# Patient Record
Sex: MALE | Race: WHITE | NOT HISPANIC OR LATINO | Employment: FULL TIME | URBAN - METROPOLITAN AREA
[De-identification: names, ages, dates, MRNs, and addresses within clinical notes are randomized per-mention and may not be internally consistent; named-entity substitution may affect disease eponyms.]

---

## 2017-06-19 ENCOUNTER — TRANSFERRED RECORDS (OUTPATIENT)
Dept: HEALTH INFORMATION MANAGEMENT | Facility: CLINIC | Age: 52
End: 2017-06-19

## 2018-01-25 ENCOUNTER — TELEPHONE (OUTPATIENT)
Dept: TRANSPLANT | Facility: CLINIC | Age: 53
End: 2018-01-25

## 2018-01-25 DIAGNOSIS — I10 HYPERTENSION: Primary | ICD-10-CM

## 2018-01-25 DIAGNOSIS — E10.8 DM (DIABETES MELLITUS), TYPE 1 WITH COMPLICATIONS (H): ICD-10-CM

## 2018-01-25 DIAGNOSIS — Z76.82 ORGAN TRANSPLANT CANDIDATE: ICD-10-CM

## 2018-01-25 NOTE — LETTER
Clemente Graham  [address] [facility] [address]  Critical access hospital [id number]    Dear Clemente,    Thank you for your interest in the Transplant Center at [facility], [place], [facility]. We look forward to being a part of your care team and assisting you through the transplant process.    As we discussed, your transplant coordinator is Letty Canas (Pancreas).  You may call your coordinator at any time with questions or concerns.  Your first scheduled call will be on 2/13/2018 between 2-4pm and your tentative evaluation date is 3/19/2018.  If this needs to change, call [phone].    Please complete the following.    1. Fill out and return the enclosed forms    Authorization for Electronic Communication    Authorization to Discuss Protected Health Information    [employer] Release of Information    2. Sign up for:    [employer], access to your electronic medical record (see enclosed pamphlet)    [employer], a transplant education website (see enclosed booklet)    You can use these tools to learn more about your transplant, communicate with your care team, and track your medical details      Sincerely,      Solid Organ Transplant  [facility], [place], [facility]    cc: Referring Physician,PCP

## 2018-01-25 NOTE — TELEPHONE ENCOUNTER
Intake Progress Note  Nurse Call: 2/13/2018 between 2-4pm  Save the Date: 3/19/2018 w/Finger    Organ:  Pancreas    [facility]    Referring Physician (outside provider, if patient does not remember the name of provider contact clinic or dialysis unit to get this information) :  Dr. Clark Culver  (If inside referral, ask who their community nephrologist is that sent the to [facility])          PCP: [place]    Assigned Coordinator: Selma Missael    Reported Diagnosis that caused the kidney failure ( not CKD)  Diabetic type 1    Best time patient can be reached:  Anytime    How would you like to be communicated with, through [employer], phone, or mail? phone      Records:  [employer] requested from: [facility] [place]      Insurance information:  [facility]  Policy mejia:  self  Subscriber/policy/ID number:  [id number]  Group Number:  [id number]    History of diabetes:  yes  Type 1    If yes, age of onset:  17 yrs old    Do you have an endocrinologist?: no  Name and Location:  n/a         On insulin or oral medication:  yes          What type of insulin and how many units? On pump          History of a kidney biopsy:  no         If Yes,when and where:  n/a    Past Medical and Surgical History (updated in Epic medical / surgical):             History of  cancer personally:  no, What type? n/a              Where and when was it treated? n/a                            History of cardiac events:  no             When and where was it was it treated?n/a             History abdominal surgeries other than previous transplant: no What type? n/a              Where and when? n/a              History of previous transplant: no             If Yes where and estimated date:  n/a             Listed or in eval at another transplant center?  no             History of hospitalization in last 12 months:  no               If Yes:  n/a               History of blood transfusion:  no                Smoking history:  no     Current smoker:  no  How much?n/a      Quit Date:  n/a    On dialysis: no  Where: n/a                 Type of Dialysis: n/a   Start date: n/a       Dialysis Days:  n/a       If NYOD, estimated GFR:  ?  Height:  6'4  Weight:  260  BMI:  31.64    Health Maintenance:  PAP: n/a  Mammo:  n/a  Colon:  6/2016  PSA:  n/a  Dental: 1 yr ago, have dentures  Vaccines: Up to date  Special Needs (ie--wheelchair, assistance, guardian, interpretor):  no    As for the next steps, as long as we are able to get financial approval and receive your medical records, you should be expecting a call from your Transplant Coordinator in about 2 weeks. Your Transplant Coordinator will go into more detail about the evaluation process, but we can put a hold on a tentative date for your transplant evaluation now. Kidney (K/P) evaluations are scheduled for Monday, Wednesday, or Thursdays, and can start as early as 6:30 am and end as late as 4:30pm. Would one of these days work better for you? Great, I am going to put a hold on Day/Month for you. Again, this appointment day and time is subject to change and is dependent on financial approval from your insurance company and our receiving your medical records so we are able to meet your individual needs.    I also want to schedule your first call with the coordinator. (Offer a couple choices and schedule patient's preferred date and time.)      Inform patient on the need to arrange age appropriate cancer screening, vaccines up to date and dental clearance    Reviewed evaluation process and reminded patient to complete questionnaire, complete medical records release, and review packet prior to evaluation visit     Informed patient that coordinator will review their chart and insurance coverage and if no concerns they will receive a call from a  to schedule evaluation

## 2018-02-07 ENCOUNTER — MEDICAL CORRESPONDENCE (OUTPATIENT)
Dept: TRANSPLANT | Facility: CLINIC | Age: 53
End: 2018-02-07

## 2018-02-13 NOTE — TELEPHONE ENCOUNTER
I reviewed the records and called Clemente in response to Nephrologist Dr Culver's referral for possible pancreas transplant.  Clemente is a 53 year old who has had Type 1 diabetes since age 17.  He is currently using an Insulin pump but A1C persistently in 13 range.  He has microalbuminuriia, GFR of 58 January 2018 but has been as low as 40, also has hypertension, background retinopathy, polyneuropathy, amputated toe, *high PVR so now straight caths 2-4 times/day.  He is followed by a Urologist and has had urodynamics.  He denies trouble with UTIs.  He does not have an Endocrinologist.  He underwent stress test 12/2017 and states he has an appointment with an [facility] Cardiologist 2/15/18; I advised him to discuss transplant with him and request risk assessment for transplant surgery if possible but I didn't guarantee that our team would accept.  Clemente still works at [employer] and asked questions about post-op recovery and limitations.  I gave him basic information about pancreas transplant, benefits and risks, post-transplant care, medications.  I told him that him that his kidney function will be carefully considered and told him that some individuals will end up needing a kidney transplant.  He says he has siblings who might be willing donors if he needs a kidney.  [place]  Wife will likely be coming with him.  Orders to  for 1.5 days of evaluation so he can have ultrasounds on 2nd day which require him to be NPO.  I provided my direct number and told him to call with questions.  I sensed that Clemente has some hesitation about the idea of pancreas transplant, I told him there is no commitment at this point and he sounded relieved to hear that.    Health Maintenance:  Colonoscopy completed 6/2017 (next due 6/2022).  Has full dentures so does not need dentist.  Has had Pneumovax 4/2017, likely will need Heptavax.

## 2018-02-22 ENCOUNTER — TELEPHONE (OUTPATIENT)
Dept: TRANSPLANT | Facility: CLINIC | Age: 53
End: 2018-02-22

## 2019-09-26 ENCOUNTER — ALLIED HEALTH/NURSE VISIT (OUTPATIENT)
Dept: EDUCATION SERVICES | Facility: OTHER | Age: 54
End: 2019-09-26
Attending: FAMILY MEDICINE
Payer: COMMERCIAL

## 2019-09-26 DIAGNOSIS — E10.65 UNCONTROLLED TYPE 1 DIABETES MELLITUS WITH HYPERGLYCEMIA (H): Primary | ICD-10-CM

## 2019-09-26 PROCEDURE — G0108 DIAB MANAGE TRN  PER INDIV: HCPCS | Performed by: REGISTERED NURSE

## 2019-09-26 NOTE — PROGRESS NOTES
"   Diabetes Education Consult for CGM and Insulin Pump    SUBJECTIVE:  Clemente Graham is an 54 year old male who presents for education regarding Continuous Glucose Monitoring (CGM) and insulin pump therapy for Type 1 diabetes mellitus.   Current treatment includes SMBG and Medtronic insulin pump.      Patient brings in a back up pump sent to him from [employer] as his current pump was cracked.  He would like his settings transferred to this back up pump.  He states will be eligible for a new pump at the end of next year.  He is interested to learn more about newer pumps and CGM.         No results found for: GLUCOSE  No results found for: CHOL, CHOLHDLRATIO, HDL, LDL  No components found for: MICROALBCRU, [id number], MICROALBRAND      Social History     Tobacco Use     Smoking status: Not on file   Substance Use Topics     Alcohol use: Not on file       No current outpatient medications on file.        Allergies: Patient has no allergy information on record.     OBJECTIVE:  Settings transferred and entered into back up pump, Medtronic Revel.  ICR 1:8 grams, ISF 1:45 mg/dL, Target 100 - 120 mg/dL, IOB 4:00 hours and Basal 34.00 units per 24 hours.  No new setting changes at this time.  Due to elevated HbA1c, currently 12.7%, recommend patient visit with his CDE for insulin pump adjustment.  Stressed importance of testing BG daily to help keep tight control of T1DM, helping to minimize risk for possible complications of uncontrolled diabetes.  Discussed benefits of keeping A1c under 7% and encouraged patient to begin testing BG daily or begin using Continuous Glucose Monitoring.      Patient states he used to have the FreeStyle Thao CGM, but insurance stopped covering this.  He is interested in learning more about Dexcom CGM with alerts and alarms.  He notes, \"I remember my doctor tried to get me the Dexcom, but I didn't get a call back from [employer].  I'm not sure why.\"    Discussed using CGM and an insulin pump for " tighter control of T1DM.  Discussed how the pump and CGM works as well as benefits and contraindications regarding CGM and pump therapy.      Patient most interested in Dexcom G6 CGM.  Demonstrated device and brochure given for further review.  Prescription request will be sent to PCP.  After submission, patient should receive call from [facility] in [place] with results of insurance benefit investigation.      PLAN:    Encouraged patient to follow up if any further questions or concerns.      Time spent with patient was 30 minutes.     Bindu Spring RN, BSN, CDE  9/26/2019 11:28 AM

## 2019-11-13 ENCOUNTER — TELEPHONE (OUTPATIENT)
Dept: INTERNAL MEDICINE | Facility: OTHER | Age: 54
End: 2019-11-13

## 2019-11-13 NOTE — TELEPHONE ENCOUNTER
Patients wife called stating patient has blisters on his toes and he is diabetic. Patient wanted to see RVK herself, and did not want me to check on other providers who may had openings. He wanted to be worked in before her next available on 11/19.    Please call patient's wife at [phone]    Rochelle Garrett on 11/13/2019 at 9:03 AM

## 2019-11-13 NOTE — TELEPHONE ENCOUNTER
Spoke with christine and relayed message, transferred to scheduling. Sachi Arguelles LPN .............11/13/2019  10:14 AM

## 2019-11-18 ENCOUNTER — OFFICE VISIT (OUTPATIENT)
Dept: INTERNAL MEDICINE | Facility: OTHER | Age: 54
End: 2019-11-18
Attending: NURSE PRACTITIONER
Payer: COMMERCIAL

## 2019-11-18 VITALS
HEIGHT: 76 IN | BODY MASS INDEX: 29.37 KG/M2 | DIASTOLIC BLOOD PRESSURE: 76 MMHG | WEIGHT: 241.2 LBS | SYSTOLIC BLOOD PRESSURE: 136 MMHG | OXYGEN SATURATION: 97 % | RESPIRATION RATE: 16 BRPM | HEART RATE: 83 BPM | TEMPERATURE: 96.6 F

## 2019-11-18 DIAGNOSIS — R33.9 URINARY RETENTION: ICD-10-CM

## 2019-11-18 DIAGNOSIS — E10.621 DIABETIC ULCER OF TOE OF LEFT FOOT ASSOCIATED WITH TYPE 1 DIABETES MELLITUS, UNSPECIFIED ULCER STAGE (H): Primary | ICD-10-CM

## 2019-11-18 DIAGNOSIS — L97.529 DIABETIC ULCER OF TOE OF LEFT FOOT ASSOCIATED WITH TYPE 1 DIABETES MELLITUS, UNSPECIFIED ULCER STAGE (H): Primary | ICD-10-CM

## 2019-11-18 DIAGNOSIS — I10 BENIGN ESSENTIAL HYPERTENSION: ICD-10-CM

## 2019-11-18 DIAGNOSIS — E10.59 TYPE 1 DIABETES MELLITUS WITH OTHER CIRCULATORY COMPLICATION (H): ICD-10-CM

## 2019-11-18 PROCEDURE — 99203 OFFICE O/P NEW LOW 30 MIN: CPT | Performed by: NURSE PRACTITIONER

## 2019-11-18 RX ORDER — PROCHLORPERAZINE 25 MG/1
SUPPOSITORY RECTAL
COMMUNITY
Start: 2019-10-25 | End: 2022-06-23

## 2019-11-18 RX ORDER — LISINOPRIL 40 MG/1
40 TABLET ORAL DAILY
COMMUNITY
Start: 2019-06-12 | End: 2020-06-23

## 2019-11-18 RX ORDER — ATORVASTATIN CALCIUM 40 MG/1
40 TABLET, FILM COATED ORAL DAILY
COMMUNITY
Start: 2019-05-28 | End: 2020-08-11

## 2019-11-18 RX ORDER — TAMSULOSIN HYDROCHLORIDE 0.4 MG/1
1 CAPSULE ORAL DAILY
Refills: 3 | COMMUNITY
Start: 2019-10-16 | End: 2021-04-08

## 2019-11-18 RX ORDER — SULFAMETHOXAZOLE/TRIMETHOPRIM 800-160 MG
1 TABLET ORAL 2 TIMES DAILY
COMMUNITY
Start: 2019-11-17 | End: 2019-11-21

## 2019-11-18 RX ORDER — MULTIVITAMIN
1 TABLET ORAL DAILY
COMMUNITY
Start: 2007-01-22

## 2019-11-18 RX ORDER — AMLODIPINE BESYLATE 5 MG/1
5 TABLET ORAL AT BEDTIME
COMMUNITY
Start: 2019-07-03 | End: 2020-07-30

## 2019-11-18 SDOH — HEALTH STABILITY: MENTAL HEALTH: HOW OFTEN DO YOU HAVE A DRINK CONTAINING ALCOHOL?: MONTHLY OR LESS

## 2019-11-18 ASSESSMENT — ENCOUNTER SYMPTOMS
DIAPHORESIS: 0
NUMBNESS: 1
WOUND: 1
FATIGUE: 0
FEVER: 0
CHILLS: 0

## 2019-11-18 ASSESSMENT — PAIN SCALES - GENERAL: PAINLEVEL: NO PAIN (0)

## 2019-11-18 ASSESSMENT — MIFFLIN-ST. JEOR: SCORE: 2035.58

## 2019-11-18 NOTE — PROGRESS NOTES
Subjective:  He is here today for wound evaluation.  He believes he had an injury to his foot over a week ago when he kicked an object.  He noticed blisters on the first 2 toes last week.  He had appointment scheduled but then was hospitalized from November 14 through the 17th with diabetic infection of the left foot primarily great toe and second digit.  He was treated with IV Zosyn and then switched over to IV vancomycin as staph aureus was growing and there was concern for MRSA.  While hospitalized he had no fever or chills but wanted to get treated immediately when he noticed blisters and redness.  He has noticed improvement in the redness and swelling since being in the hospital.  He was discharged on Bactrim DS twice daily for 1 week.  He was not scheduled to follow-up appointment with his primary doctor since he had wound care visit appointment today.  He currently is cleansing the wounds with chlorhexidine and applying Telfa.  He has been elevating.  He does not wear diabetic custom fitted footwear.  He usually wears his tennis shoes and work boots and neither of these have ever caused problems in the past.  While hospitalized he did have an x-ray of the left great toe which showed some subcutaneous emphysema.  He then went on to have a CT scan which still showed some air-fluid levels however treating hospital doctor was able to drain some pus and felt that was likely the cause.  He did not have any follow-up CT scan or x-ray after that procedure.  Does have history of partial amputation of the right great toe about 10 years ago due to a diabetic ulcer.  That became infected due to an ingrown toenail.  He does have poorly controlled type 1 diabetes.  Last A1c was a month ago at 12.7%.  He started working with Bindu, diabetic nurse educator and recently received a Dexcom.  He has not scheduled a follow-up visit with her.  He has not seen his primary doctor, Dr. Wayne in a few months.    Patient Active  Problem List   Diagnosis     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)     Diabetic ulcer of toe of left foot associated with type 1 diabetes mellitus, unspecified ulcer stage (H)     Benign essential hypertension     Urinary retention     Past Medical History:   Diagnosis Date     Carpal tunnel syndrome, bilateral      Diabetic foot ulcer (H)      Hyperlipidemia      Hypertension      Microalbuminuria      Neuropathy      Retinopathy      Trigger finger of both hands      Type 1 diabetes (H) 1982     Ulnar nerve entrapment at elbow, left      Urinary retention      Past Surgical History:   Procedure Laterality Date     AMPUTATION Right     Great toe     CARPAL TUNNEL RELEASE RT/LT Bilateral      CLOSED REDUCTION, PERCUTANEOUS PINNING HIP      age 14, pinned for dislocation     ROTATOR CUFF REPAIR RT/LT Bilateral      Social History     Socioeconomic History     Marital status:      Spouse name: Not on file     Number of children: Not on file     Years of education: Not on file     Highest education level: Not on file   Occupational History     Not on file   Social Needs     Financial resource strain: Not on file     Food insecurity:     Worry: Not on file     Inability: Not on file     Transportation needs:     Medical: Not on file     Non-medical: Not on file   Tobacco Use     Smoking status: Never Smoker     Smokeless tobacco: Never Used     Tobacco comment: no ecig   Substance and Sexual Activity     Alcohol use: Yes     Frequency: Monthly or less     Drug use: Never     Sexual activity: Not Currently   Lifestyle     Physical activity:     Days per week: Not on file     Minutes per session: Not on file     Stress: Not on file   Relationships     Social connections:     Talks on phone: Not on file     Gets together: Not on file     Attends [employer] service: Not on file     Active member of club or organization: Not on file     Attends meetings of clubs or organizations: Not on file      Relationship status: Not on file     Intimate partner violence:     Fear of current or ex partner: Not on file     Emotionally abused: Not on file     Physically abused: Not on file     Forced sexual activity: Not on file   Other Topics Concern     Not on file   Social History Narrative    Works at [employer].    - wife Jeanette    1 son- Will    Preload  7/2/2013     Family History   Problem Relation Age of Onset     Cancer Mother         ovarian     Prostate Cancer Father      Current Outpatient Medications   Medication Sig Dispense Refill     amLODIPine (NORVASC) 5 MG tablet Take 5 mg by mouth At Bedtime       atorvastatin (LIPITOR) 40 MG tablet Take 40 mg by mouth daily       blood glucose (NO BRAND SPECIFIED) test strip Check sugar 6 times daily       Catheters MISC Bard Magic3 14F ref #[id number] - use to self-catheterize 4 times daily.       Continuous Blood Gluc  (DEXCOM G6 ) RONALD        Continuous Blood Gluc Sensor (DEXCOM G6 SENSOR) MISC every 30 days       Continuous Blood Gluc Transmit (DEXCOM G6 TRANSMITTER) MISC        glucagon 1 MG kit Inject 1 mg into the muscle as needed       insulin aspart (NOVOLOG VIAL) 100 UNITS/ML vial pump       lisinopril (PRINIVIL/ZESTRIL) 40 MG tablet Take 40 mg by mouth daily       Multiple Vitamin (MULTI VITAMIN W/D-3) TABS Take 1 tablet by mouth daily       sulfamethoxazole-trimethoprim (BACTRIM DS/SEPTRA DS) 800-160 MG tablet Take 1 tablet by mouth 2 times daily       aspirin (ASA) 81 MG tablet Take 1 tablet by mouth daily       tamsulosin (FLOMAX) 0.4 MG capsule Take 1 capsule by mouth daily  3     Patient has no known allergies.      Review of Systems:  Review of Systems   Constitutional: Negative for chills, diaphoresis, fatigue and fever.   Skin: Positive for wound.        Reports he has never had any angiography of the lower extremities to evaluate arterial flow.  Denies claudication symptoms.   Neurological: Positive for numbness.      "   No sensation in feet due to severe diabetic neuropathy.       Objective:   /76 (BP Location: Right arm, Patient Position: Sitting, Cuff Size: Adult Large)   Pulse 83   Temp 96.6  F (35.9  C) (Tympanic)   Resp 16   Ht 1.93 m (6' 4\")   Wt 109.4 kg (241 lb 3.2 oz)   SpO2 97%   BMI 29.36 kg/m    Physical Exam  Pleasant gentleman accompanied by his wife.  No acute distress.  He does still have light pink discoloration at the first second third and fourth digits.  Redness has regressed from the marker lines that were placed prior to treatment with IV antibiotics in hospital.  He is missing the right great toenail.  There is some fluctuance at the base of the nailbed.  Scant amount of clear drainage coming from the medial aspect.  He does have ulceration on the right great toe measuring 0.3 x 0.7 cm.  There is some slough present along the base of the nailbed which is the area of the fluctuance.  Second digit left foot dorsal surface has a wound that measures 0.6 x 0.6 cm in diameter which is covered with  50% granulation tissue and 50% brown eschar.  No fluctuance.  There is some wrinkling of both of the digits from a regression of the swelling.  No ulcerations on the third or fourth digits.  Left foot DP PT is palpable.  Wounds are cleansed with chlorhexidine and Telfa applied and secured with Curlex.  Assessment:    ICD-10-CM    1. Diabetic ulcer of toe of left foot associated with type 1 diabetes mellitus, unspecified ulcer stage (H) E10.621 GENERAL SURG ADULT REFERRAL    L97.529     1st and 2nd digit, dorsal surface; abnormal xr and ct with subq emphysema   2. Type 1 diabetes mellitus with other circulatory complication (H) E10.59    3. Benign essential hypertension I10    4. Urinary retention R33.9        Plan:   1.  Discussed the importance of clearing the infection as he is at high risk of progressive infection and poor wound healing due to his poorly controlled type 1 diabetes.  For these reasons " he is at increased risk of amputation.  Antibiotics seem to be working at this time.  He was treated with both IV vancomycin and Zosyn while hospitalized.  He was discharged on Bactrim DS 1 tablet twice daily for 7 days.  He was discharged from the hospital yesterday after a 3-day hospital stay.  He had a CT and x-ray which both had concern for subcutaneous emphysema.  Will get a consult from 1 of our surgeons to make sure this has cleared and for further wound management.  At this time were going to have him continue with the chlorhexidine and the Telfa twice daily until this infection resolves.  Wound can be cleansed with soap and water.  He is at risk of MRSA.  Was not able to see blood culture or wound culture results.  Suspect these likely will be viewable through care everywhere wants final reports are completed.  If he develops increased erythema, warmth, swelling, fever, chills that he needs to be return for evaluation urgently as likely will need further hospitalization and IV antibiotics.  At some point patient likely would benefit from seeing orthotist to evaluate his foot wear.  May also need evaluation for PAD.  patient wrongly feels that the cause of his ulceration is that he kicked something with his foot last week which caused the injury.  2.  We will get him a follow-up appointment with diabetic nurse educator.  I have asked that he schedule a follow-up appoint with his primary doctor for diabetes management as well.    HOWIE Humphreys   11/18/2019  10:48 AM

## 2019-11-18 NOTE — NURSING NOTE
Chief Complaint   Patient presents with     WOUND CARE       Medication Reconciliation: complete  Sachi Arguelles LPN  ..................11/18/2019   10:14 AM

## 2019-11-19 ENCOUNTER — OFFICE VISIT (OUTPATIENT)
Dept: SURGERY | Facility: OTHER | Age: 54
End: 2019-11-19
Attending: NURSE PRACTITIONER
Payer: COMMERCIAL

## 2019-11-19 VITALS
TEMPERATURE: 97.1 F | RESPIRATION RATE: 18 BRPM | WEIGHT: 248 LBS | BODY MASS INDEX: 30.19 KG/M2 | SYSTOLIC BLOOD PRESSURE: 138 MMHG | DIASTOLIC BLOOD PRESSURE: 70 MMHG | HEART RATE: 80 BPM

## 2019-11-19 DIAGNOSIS — L08.9 TYPE 1 DIABETES MELLITUS WITH DIABETIC FOOT INFECTION (H): ICD-10-CM

## 2019-11-19 DIAGNOSIS — E10.628 TYPE 1 DIABETES MELLITUS WITH DIABETIC FOOT INFECTION (H): ICD-10-CM

## 2019-11-19 PROCEDURE — 99203 OFFICE O/P NEW LOW 30 MIN: CPT | Performed by: SURGERY

## 2019-11-19 ASSESSMENT — PAIN SCALES - GENERAL: PAINLEVEL: NO PAIN (0)

## 2019-11-19 NOTE — PROGRESS NOTES
GENERAL SURGERY CONSULTATION NOTE    Clemente Graham   [id number] [address] [id number]  54 year old  male  Admission Date/Time: No admission date for patient encounter.  Primary Care Provider:  [place] was asked to see this patient by Chastity Marquez for evaluation of left 1st and second toe infecction.     HPI: Clemente Graham is a long standing diabetic with poor control of his diabetes and infection of his left 1st and second toes. Previously had a right partial toe amputation that healed well. No claudication. Gets cool hands, but not blue toes in cold weather.  Culture shows MSSA.       REVIEW OF SYSTEMS:    GENERAL: No fevers or chills. Denies fatigue, recent weight loss.  HEENT: No sinus drainage. No changes with vision or hearing. No difficulty swallowing.   LYMPHATICS:  Noswollen nodes in axilla, neck or groin.  CARDIOVASCULAR: Denies chest pain, palpitations and dyspnea on exertion.  PULMONARY: No shortness of breath or cough. No increase in sputum production.  GI: Denies melena, bright red blood in stools. No hematemesis. No constipation or diarrhea.  : No dysuria or hematuria.  SKIN: No recent rashes or ulcers.   HEMATOLOGY:  No history of easy bruising or bleeding.  ENDOCRINE:  ++ diabetes No thyroid problems.  NEUROLOGY:  No history of seizures or headaches. No motor or sensory changes.        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)     Diabetic ulcer of toe of left foot associated with type 1 diabetes mellitus, unspecified ulcer stage (H)     Benign essential hypertension     Urinary retention       Past Medical History:   Diagnosis Date     Carpal tunnel syndrome, bilateral      Diabetic foot ulcer (H)      Hyperlipidemia      Hypertension      Microalbuminuria      Neuropathy      Retinopathy      Trigger finger of both hands      Type 1 diabetes (H) 1982     Ulnar nerve entrapment at elbow, left      Urinary  retention        Past Surgical History:   Procedure Laterality Date     AMPUTATION Right     Great toe     CARPAL TUNNEL RELEASE RT/LT Bilateral      CLOSED REDUCTION, PERCUTANEOUS PINNING HIP      age 14, pinned for dislocation     ROTATOR CUFF REPAIR RT/LT Bilateral        Family History   Problem Relation Age of Onset     Cancer Mother         ovarian     Prostate Cancer Father        Social History     Social History Narrative    Works at [employer].    - wife Jeanette Rubi son- Will    Preload  7/2/2013       Social History     Socioeconomic History     Marital status:      Spouse name: Not on file     Number of children: Not on file     Years of education: Not on file     Highest education level: Not on file   Occupational History     Not on file   Social Needs     Financial resource strain: Not on file     Food insecurity:     Worry: Not on file     Inability: Not on file     Transportation needs:     Medical: Not on file     Non-medical: Not on file   Tobacco Use     Smoking status: Never Smoker     Smokeless tobacco: Never Used     Tobacco comment: no ecig   Substance and Sexual Activity     Alcohol use: Yes     Frequency: Monthly or less     Drug use: Never     Sexual activity: Not Currently   Lifestyle     Physical activity:     Days per week: Not on file     Minutes per session: Not on file     Stress: Not on file   Relationships     Social connections:     Talks on phone: Not on file     Gets together: Not on file     Attends Evangelical service: Not on file     Active member of club or organization: Not on file     Attends meetings of clubs or organizations: Not on file     Relationship status: Not on file     Intimate partner violence:     Fear of current or ex partner: Not on file     Emotionally abused: Not on file     Physically abused: Not on file     Forced sexual activity: Not on file   Other Topics Concern     Not on file   Social History Narrative    Works at [employer].    - wife Jeanette    1 son- Will    Preload  7/2/2013       amLODIPine (NORVASC) 5 MG tablet, Take 5 mg by mouth At Bedtime  aspirin (ASA) 81 MG tablet, Take 1 tablet by mouth daily  atorvastatin (LIPITOR) 40 MG tablet, Take 40 mg by mouth daily  blood glucose (NO BRAND SPECIFIED) test strip, Check sugar 6 times daily  Catheters [facility], Bard Magic3 14F ref #[id number] - use to self-catheterize 4 times daily.  Continuous Blood Gluc  (DEXCOM G6 ) RONALD,   Continuous Blood Gluc Sensor (DEXCOM G6 SENSOR) MISC, every 30 days  Continuous Blood Gluc Transmit (DEXCOM G6 TRANSMITTER) MISC,   glucagon 1 MG kit, Inject 1 mg into the muscle as needed  insulin aspart (NOVOLOG VIAL) 100 UNITS/ML vial, pump  lisinopril (PRINIVIL/ZESTRIL) 40 MG tablet, Take 40 mg by mouth daily  Multiple Vitamin (MULTI VITAMIN W/D-3) TABS, Take 1 tablet by mouth daily  sulfamethoxazole-trimethoprim (BACTRIM DS/SEPTRA DS) 800-160 MG tablet, Take 1 tablet by mouth 2 times daily  tamsulosin (FLOMAX) 0.4 MG capsule, Take 1 capsule by mouth daily    No current facility-administered medications on file prior to visit.         ALLERGIES/SENSITIVITIES: No Known Allergies    PHYSICAL EXAM:     /70 (BP Location: Right arm, Patient Position: Sitting, Cuff Size: Adult Large)   Pulse 80   Temp 97.1  F (36.2  C) (Temporal)   Resp 18   Wt 112.5 kg (248 lb)   BMI 30.19 kg/m      General Appearance:   Appears well   Heart & CV:  RRR no murmur.  Intact distal pulses, good cap refill.  LUNGS:  CTA B/L, no wheezing or crackles.  Abd:  Soft, flat, nontender  Ext: Left foot shows 1st toe with nail avulsion. There is erythema to the base of the toes. The second and third toes have superficial blisters. The Left foot has a palpable DP and a dopperable PT with monophasic to weakly biphasic waveforms. The DP is triphasic.       ADDITIONAL COMMENTS:      CONSULTATION ASSESSMENT AND PLAN:    54 year old male with diabetic left foot  infection s/p nail avulsion and IV antibiotics.   - Complete antibiotics  - Follow-up in 3-4 wks and sooner if not continuing to improve.   - No role for debridement or amputation at this point.   - Would pursue COLLEEN and MRA/CTA if a residual ulcer remains.     Polo Merida MD on 11/19/2019 at 1:25 PM

## 2019-11-19 NOTE — NURSING NOTE
"Chief Complaint   Patient presents with     WOUND CARE     ulcer on toe       Initial /70 (BP Location: Right arm, Patient Position: Sitting, Cuff Size: Adult Large)   Pulse 80   Temp 97.1  F (36.2  C) (Temporal)   Resp 18   Wt 112.5 kg (248 lb)   BMI 30.19 kg/m   Estimated body mass index is 30.19 kg/m  as calculated from the following:    Height as of 11/18/19: 1.93 m (6' 4\").    Weight as of this encounter: 112.5 kg (248 lb).  Medication Reconciliation: complete    Venessa Proctor LPN  "

## 2019-12-04 ENCOUNTER — ALLIED HEALTH/NURSE VISIT (OUTPATIENT)
Dept: EDUCATION SERVICES | Facility: OTHER | Age: 54
End: 2019-12-04
Attending: NURSE PRACTITIONER
Payer: COMMERCIAL

## 2019-12-04 DIAGNOSIS — E10.59 TYPE 1 DIABETES MELLITUS WITH OTHER CIRCULATORY COMPLICATION (H): Primary | ICD-10-CM

## 2019-12-04 PROCEDURE — G0108 DIAB MANAGE TRN  PER INDIV: HCPCS | Performed by: REGISTERED NURSE

## 2019-12-04 NOTE — PROGRESS NOTES
Diabetes Education Progress Note  Continuous Glucose Monitoring System (CGMS)     SUBJECTIVE: Juan is a 54 y.o. male who has type 1 diabetes and is here for Personal Continuous Glucose Monitoring System (CGMS) training.      OBJECTIVE:  FBG today: 250 mg/dl.   Testing frequency: 0 - 4 times a day    Current Diabetes Medications:  Novolog insulin per Medtronic insulin pump system.     ASSESSMENT: Patient was instructed in features of DEXCOM G6 CGMS.  He was instructed in programming a BG reading, events for meds, CHO, exercise and health such as stress, illness, high/low symptoms.  Patient will wear the sensor for 10 days and if he would like, can download at home using Dexcom Clarity software found at [email or web address].       Patient demonstrated understanding by inserting his own sensor.  Sensor inserted without difficulty,  receiving data.  Helped patient program his .  Reviewed use and calibrations needed every 12 hours.       Due to patient elevated HbA1c, fasting hyperglycemia, and recent foot issue, recommend patient follow up in 2 weeks for sensor glucose assessment and insulin pump management.  Appointment scheduled, 12/18/2019, 10:00 am.        Time spent with patient was 45 minutes.     See patient instructions for complete plan.     Bindu Spring RN, BSN, CDE  12/4/2019 10:40 AM

## 2019-12-04 NOTE — PATIENT INSTRUCTIONS
Continuous Glucose Monitoring Personal Start      Congratulations! You have decided to utilize the DEXCOM G6 Continuous Glucose Monitoring (CGM) System (G6). We strongly believe in the use of the new technologies to better assist you in managing your diabetes. These technologies give both you and your provider a better understanding of your diabetes and current trends, which will enable you to improve control of your blood glucose levels.      Low and high alerts are set 80 mg/dL and 200 mg/dL. You inserted your sensor today at 10:15 am.  You should begin receiving sensor glucose readings in 2 hours after insertion.  Your sensor lasts 10 days and you will be prompted when the sensor is close to expiration and a new sensor can then be started.    [employer] [facility] is a team of trainers and certified  diabetes educators dedicated to helping you with  your new G6. They provide live, interactive support  and webinars to get you started and keep you  informed.     You may download your CGM information from your  at [email or web address].  Choose the Dexcom Clarity software.     Dexcom CLARITY software is an important part of your G6.  Clarity highlights your glucose patterns, trends and statistics. Share  with your clinic and monitor improvements between visits.    To use Dexcom CLARITY:    1. Log in at [employer].  Use your [employer] G6 login or create an account.    2. Get weekly notifications with Dexcom CLARITY Reports Florentin.  Notifications available to Dexcom G6 users.       PLAN:      1. No need for calibrations if, at the time of insertion, you enter the sensor code into your florentin or .  If you do not have a code or choose not to enter the code, you will need to calibrate the G6 florentin/.  Follow calibration instructions on your florentin or  for sensor period (10 days).     2. Events - You are encouraged to enter events such as Carbohydrates (grams), Insulin (units), Exercise (intensity  and duration), Health (illness, stress, high/low symptoms, and alcohol).     3. Battery - Charge  with  1 - 3 hours every 3 - 5 days or as  battery depletes.      4. Please follow up if any questions or concerns.    Dexcom Technical Support is available 24 hours a day/7 days a week at:        [email or web address]    [email or web address]    Toll free: [phone]      Bindu Spring RN, BSN, CDE 12/4/2019 10:46 AM

## 2019-12-18 ENCOUNTER — ALLIED HEALTH/NURSE VISIT (OUTPATIENT)
Dept: EDUCATION SERVICES | Facility: OTHER | Age: 54
End: 2019-12-18
Attending: FAMILY MEDICINE
Payer: COMMERCIAL

## 2019-12-18 DIAGNOSIS — E10.59 TYPE 1 DIABETES MELLITUS WITH OTHER CIRCULATORY COMPLICATION (H): Primary | ICD-10-CM

## 2019-12-18 PROCEDURE — 99211 OFF/OP EST MAY X REQ PHY/QHP: CPT | Performed by: REGISTERED NURSE

## 2019-12-18 NOTE — PROGRESS NOTES
"Diabetes Self-Management Education & Support    Diabetes Self-Management Education & Support - Insulin Pump/CGM Review    SUBJECTIVE/OBJECTIVE  Presents for: Individual review  Accompanied by: Self, Spouse  Focus of Visit: CGM, Insulin Pump  Diabetes type: Type 1  Date of diagnosis: 1/1983  Disease course: Stable  How confident are you filling out medical forms by yourself:: Extremely  Transportation concerns: No  Cultural Influences/Ethnic Background:  American    Patient shares he has been having low BG during the day and night, \"I had to drink juice on two different occasions during the night.\"    Patient seen today for Insulin Pump CGM  Review:  ASSESSMENT    Reports:    CGM download glucose results:    Report shows 184 mg/dl average sensor glucose over the past two weeks.    Standard deviation (SD) is +- 78 mg/dl. Goal for SD is +-50 mg/dl or lower.    Patient calibrates the CGM an average of 0.3 times daily.      Glucose 51% in target (70 - 180), 46% above target (181 - 400), 2% below target (56 - 69) and 1% hypo (39 - 55).      Most significant patterns of highs found between 8:20 am and 10:30 am.    No significant patterns of lows found.    Insulin Pump download:    Changing infusion set every 5 days.     Average daily carbohydrates: 133 +/- 51 grams  Average total daily insulin: 54.2 +/- 7.0 units  Basal: 63%  Bolus: 37%    See scanned CGM and pump upload reports for details with pump settings.      Plan:    1.   Due to Basal/Bolus percentage split 63/37 with glucose dropping low during the night and at times, during the day, recommend basal decrease as   BASAL RATES and times:  12   AM (midnight): 1.50 units/hour  Adjusted to 1.25 unit/hour at 12:00 am - 12:00 am  4     AM: 1.00 units/hour   6     AM: 1.40 units/hour   4    PM: 1.50 units/hour   Total Daily Basal 34.00 units  Adjusted to 30.00 units per 24 hours.    2.  Try to change infusion set every 3 days (fill cartridge with 200 units (2 mL) to help " remind you to change set out every 3 days.    3.  Try to enter sensor glucose readings into your pump before each meal and at bedtime.    Insulin Pump Review  Problems taking diabetes medications regularly?: No  Diabetes medication side effects?: No      Healthy Eating  Healthy Eating Assessed Today: Yes  Cultural/[place] diet restrictions?: No  Patient on a regular basis: Eats 3 meals a day, Snacks frequently throughout the day, Counts carbohydrates  Meal planning: Carbohydrate counting  Meals include: Breakfast, Lunch, Dinner, Snacks  Beverages: Diet soda, Milk, Water  Has patient met with a dietitian in the past?: Yes    Being Active  Being Active Assessed Today: Yes  Exercise:: Yes  Days per week of moderate to strenuous exercise (like a brisk walk): 6  On average, minutes per day of exercise at this level: 150 or greater  How intense was your typical exercise? : Light (like stretching or slow walking)(Light to moderate activity during working hours at [employer].)  Exercise Minutes per Week: 900  Barrier to exercise: None    Monitoring  Monitoring Assessed Today: Yes  Blood Glucose Meter: CGM    Taking Medications  Diabetes Medication(s)     Diabetic Other       glucagon 1 MG kit    Inject 1 mg into the muscle as needed    Insulin       insulin aspart (NOVOLOG VIAL) 100 UNITS/ML vial    pump          Current Treatments: Insulin Pump  Given by: Patient  Injection/Infusion sites: Abdomen  Problems taking diabetes medications regularly?: No  Diabetes medication side effects?: No  Treatment Compliance: All of the time    Problem Solving  Problem Solving Assessed Today: Yes  Hypoglycemia Frequency: Weekly  Hypoglycemia Treatment: Candy  Patient carries a carbohydrate source: Yes(Can of regular pop, cookie, sweet snack.)  Medical alert: No    Hypoglycemia symptoms  Tremors: Yes(Weak and blurry vision and shaky.)    Hypoglycemia Complications  Blackouts: No  Hospitalization: No  Nocturnal hypoglycemia:  Yes  Required assistance: No  Required glucagon injection: No  Seizures: No    Reducing Risks  Reducing Risks Assessed Today: Yes  Diabetes Risks: Age over 45 years, Family History  CAD Risks: Hypertension, Male sex, Diabetes Mellitus, Family history  Has dilated eye exam at least once a year?: Yes  Sees dentist every 6 months?: No  Sees podiatrist (foot doctor)?: Yes    Healthy Coping  Healthy Coping Assessed Today: Yes  Emotional response to diabetes: Acceptance  Informal Support system:: Spouse, Family  Stage of change: ACTION (Actively working towards change)  Support resources: Offerings in Clinic Communities  Patient Activation Measure Survey Score:  No flowsheet data found.        INTERVENTION:   Diabetes knowledge and skills assessment:     Patient is knowledgeable in diabetes management concepts related to: Being Active and Taking Medication    Patient needs further education on the following diabetes management concepts: Healthy Eating, Monitoring, Problem Solving, Reducing Risks and Healthy Coping    Based on learning assessment above, most appropriate setting for further diabetes education would be: Group class or Individual setting.    Education provided today on:  AADE Self-Care Behaviors:  Monitoring: frequency of monitoring  Problem Solving: high blood glucose - causes, signs/symptoms, treatment and prevention, low blood glucose - causes, signs/symptoms, treatment and prevention, carrying a carbohydrate source at all times and medical identification  Reducing Risks: major complications of diabetes, prevention, early diagnostic measures and treatment of complications and A1C - goals, relating to blood glucose levels, how often to check    Opportunities for ongoing education and support in diabetes-self management were discussed.    Pt verbalized understanding of concepts discussed and recommendations provided today.       Education Materials Provided:  No new materials provided today      PLAN  See  Patient Instructions for co-developed, patient-stated behavior change goals.  AVS printed and provided to patient today. See Follow-Up section for recommended follow-up.    Bindu Spring RN, BSN, CDE  12/18/2019 10:55 AM   Time Spent: 80 minutes  Encounter Type: Individual    Any diabetes medication dose changes were made via the CDE Protocol and Collaborative Practice Agreement with the patient's primary care provider. A copy of this encounter was shared with the provider.

## 2019-12-18 NOTE — PATIENT INSTRUCTIONS
Diabetes Goals and Reminders    Your A1c test should be done every 3 months.  Your goal is less than 7%.   Your last result is:  No results found for: A1C    Your LDL cholesterol test should be done at least once a year.    Your last result is:  No results found for: LDL    Have blood pressure and weight checked every three months.  Your blood pressure goal is 140/90 or less.  Your last blood pressure reading was:    BP Readings from Last 1 Encounters:   11/19/19 138/70       Use your Dexcom G6 CGM to enter glucose 4 times per day.  Your home blood glucose target ranges are:  Before meals:    2 hours after a meal:  Less than 180  Bedtime:  100-140    Avoid all tobacco.  Follow your healthy diet and exercise plan.  See the eye doctor every year.  See the dentist every six months.  Have kidney function tested yearly.    Take all medicine as prescribed.  Please let me know if you are having symptoms you don t expect or if you wish to stop any medicine.    Current Pump settings   Pump Type:  Medtronic  Insulin Type: Novolog  BASAL RATES and times:  12   AM (midnight): 1.50 units/hour  Adjusted to 1.25 unit/hour at 12:00 am - 12:00 am  4     AM: 1.00 units/hour   6     AM: 1.40 units/hour   4    PM: 1.50 units/hour   Total Daily Basal 34.00 units  Adjusted to 30.00 units per 24 hours  Insulin to Carbohydrate Ratio (ICR):   1 unit per 8 grams of carbohydrate at 12:00 am.  Insulin Sensitivity:   40 mg/dl at 12:00 am.  Target Blood Glucose:   100 - 120 at 12:00 am  Active Insulin: 4 hours  Total Daily Dose: 57 units  Basal %: 63   Bolus %: 37    Follow Up Plan  Your future lab plan is:  HgA1c in February 2020.    If you need your cholesterol checked at your next appointment, you should fast 8 to 10 hours before your appointment.  Do not eat or drink anything besides water.  Drink plenty of water and take all your usual medicine.    SUMMARY FOR TODAY'S VISIT    Average sensor glucose over the past 14 days 184  mg/dL.    1.  Recommended pump adjustments:    A.  Due to glucose dropping low during the night and at times, during the day, recommend basal decrease, see adjustment above.    2.  Try to change infusion set every 3 days (fill cartridge with 200 units (2 mL) to help remind you to change set out every 3 days.    3.  Try to enter sensor glucose readings into your pump before each meal and at bedtime.    4.  Follow up, as needed, for continued pump management.      Bindu Spring RN, BSN, CDE, CPT  12/18/2019 11:12 AM

## 2020-01-15 ENCOUNTER — ALLIED HEALTH/NURSE VISIT (OUTPATIENT)
Dept: EDUCATION SERVICES | Facility: OTHER | Age: 55
End: 2020-01-15
Attending: FAMILY MEDICINE
Payer: COMMERCIAL

## 2020-01-15 DIAGNOSIS — E10.59 TYPE 1 DIABETES MELLITUS WITH OTHER CIRCULATORY COMPLICATION (H): Primary | ICD-10-CM

## 2020-01-15 PROCEDURE — 99211 OFF/OP EST MAY X REQ PHY/QHP: CPT | Performed by: REGISTERED NURSE

## 2020-01-16 NOTE — PATIENT INSTRUCTIONS
Diabetes Goals and Reminders    Your A1c test should be done every 3 months.  Your goal is less than 7%.   Your last result is:  No results found for: A1C    Your LDL cholesterol test should be done at least once a year.    Your last result is:  No results found for: LDL    Have blood pressure and weight checked every three months.  Your blood pressure goal is 140/90 or less.  Your last blood pressure reading was:    BP Readings from Last 1 Encounters:   11/19/19 138/70       Use your Dexcom G6 CGM to enter glucose 4 times per day.  Your home blood glucose target ranges are:  Before meals:    2 hours after a meal:  Less than 180  Bedtime:  100-140    Avoid all tobacco.  Follow your healthy diet and exercise plan.  See the eye doctor every year.  See the dentist every six months.  Have kidney function tested yearly.    Take all medicine as prescribed.  Please let me know if you are having symptoms you don t expect or if you wish to stop any medicine.    Current Pump settings   Pump Type:  Medtronic  Insulin Type: Novolog  BASAL RATES and times:  12   AM (midnight): 1.25 units/hour  Adjusted to 1.15 unit/hour at 12:00 am - 12:00 am  4     AM: 1.00 units/hour   6     AM: 1.40 units/hour   4    PM: 1.50 units/hour   Total Daily Basal 30.00 units  Adjusted to 27.6 units per 24 hours  Insulin to Carbohydrate Ratio (ICR):   1 unit per 8 grams of carbohydrate at 12:00 am.  Insulin Sensitivity:   40 mg/dl at 12:00 am.  Target Blood Glucose:   100 - 120 at 12:00 am  Active Insulin: 4 hours  Total Daily Dose: 57 units  Basal %: 59   Bolus %: 41    Follow Up Plan  Your future lab plan is:  HgA1c in February 2020.    If you need your cholesterol checked at your next appointment, you should fast 8 to 10 hours before your appointment.  Do not eat or drink anything besides water.  Drink plenty of water and take all your usual medicine.    SUMMARY FOR TODAY'S VISIT    Average sensor glucose over the past 14 days 194  mg/dL.    1.  Recommended pump adjustments:    A.  Due to glucose dropping low during the night and at times, during the day, recommend basal decrease, see adjustment above.    2.  Continue to try to change infusion set every 3 days (fill cartridge with 180 - 190 units to help remind you to change set out every 3 days.)    3.  Discussed insulin pump option, Tandem TSlim X2, which utilizes Dexcom G6 CGM.  Take home information given for further review.  When you are ready to move forward with a new pump, please call pump representative for insurance benefit investigation.    4.  Rule of 15 for low blood glucose discussed to help decrease risk of rebound high blood glucose.    5.  Follow up in one month, for continued pump management.      Bindu Spring RN, BSN, CDE  1/15/2020 2:05 PM

## 2020-01-16 NOTE — PROGRESS NOTES
Diabetes Self-Management Education & Support    Diabetes Self-Management Education & Support - Insulin Pump/CGM Review    SUBJECTIVE/OBJECTIVE  Presents for: Individual review  Accompanied by: Self, Spouse  Focus of Visit: CGM, Insulin Pump  Diabetes type: Type 1  Date of diagnosis: 1/1983  Disease course: Stable  How confident are you filling out medical forms by yourself:: Extremely  Transportation concerns: No  Cultural Influences/Ethnic Background:  American    Patient states he is using his bolus wizard more often, still has lows once in awhile.    Patient seen today for Insulin Pump CGM  Review:    Reports:  CGM download glucose results:    Report shows 194 mg/dl average sensor glucose over the past two weeks.    Standard deviation (SD) is +- 87 mg/dl. Goal for SD is +-50 mg/dl or lower.      30d GMI (Glucose Management Indicator or HbA1c ) 8.1%.    Glucose 50% in target (70 - 180), 49% above target (181 - 400), 1% below target (56 - 69) and 0% hypo (39 - 55).      Most significant patterns of highs found between 11:25 pm and 6:00 am and between 9:20 am and 12:10 pm.    No significant patterns of lows found.  Patterns of rebound hyperglycemia noted after glucose readings below 80 mg/dL.    Insulin Pump download:    Changing infusion set every 3 - 4 days.     Average daily carbohydrates: 152 +/- 52 grams  Average total daily insulin: 50.9 +/- 8.8 units  Basal: 59%  Bolus: 41%    See scanned CGM and pump upload reports for details with pump settings.      Plan:    1.  Due to pattern of lower blood glucose with rebound hyperglycemia, recommend using Rule of 15 for treatment of hypoglycemia to help decrease rebound hyperglycemia and elevated SD.    2.  Due to basal/bolus 59/41 split, and to help decrease glucose dropping below 80 mg/dL, recommend slight basal decrease from 1.25 to 1.15 unit/hour.  New basal 27.6 units per 24 hours.    3.  See patient instructions for complete plan.           Insulin Pump  Information  Insulin Pump Type: Medtronic Minimed 530G without sensor(Uses Dexcom G6 CGM)  Pump Serial Number: [id number]  Infusion Set: Medtronic  Medtronic Infusion Set: Quick Set    Insulin Pump Review  Insulin Pump Type: Medtronic Minimed 530G without sensor(Uses Dexcom G6 CGM)  Problems taking diabetes medications regularly?: No  Diabetes medication side effects?: No  Patient would benefit from: Change in basal rate(s), Changing infusion set as directed, Appropriate bolus calculator use  Changes made to pump settings: Basal rate  Education specific to insulin pump provided today: How to use a temporary basal rate, How to use the bolus calculator appropriately, Treating hypoglycemia correctly (Rule of 15)    Statistics/Data Evaluation:  Consistent day-to-day patterns: Pattern of daytime hyperglycemia, Pattern of nocturnal hyperglycemia      Healthy Eating  Healthy Eating Assessed Today: Yes  Cultural/Anabaptist diet restrictions?: No  Meal planning: Carbohydrate counting  Meals include: Breakfast, Lunch, Dinner, Snacks  Beverages: Diet soda, Milk, Water  Has patient met with a dietitian in the past?: Yes    Being Active  Being Active Assessed Today: Yes  Exercise:: Yes  Days per week of moderate to strenuous exercise (like a brisk walk): 6  On average, minutes per day of exercise at this level: 150 or greater  How intense was your typical exercise? : Light (like stretching or slow walking)(Light to moderate activity during working hours at [employer].)  Exercise Minutes per Week: 900  Barrier to exercise: None    Monitoring  Monitoring Assessed Today: Yes  Blood Glucose Meter: CGM  Low Glucose Range (mg/dL): <70  High Glucose Range (mg/dL): >200  Overall Range (mg/dL): 180-200(14d average sensor glucose 194 with range 59 - over 400 mg/dL.)    Taking Medications  Diabetes Medication(s)     Diabetic Other       glucagon 1 MG kit    Inject 1 mg into the muscle as needed    Insulin       insulin aspart (NOVOLOG  VIAL) 100 UNITS/ML vial    pump          Current Treatments: Insulin Pump  Given by: Patient  Injection/Infusion sites: Abdomen  Problems taking diabetes medications regularly?: No  Diabetes medication side effects?: No  Treatment Compliance: All of the time    Problem Solving  Problem Solving Assessed Today: Yes  Hypoglycemia Frequency: Weekly  Hypoglycemia Treatment: Candy  Patient carries a carbohydrate source: Yes(Can of regular pop, cookie, sweet snack.)  Medical alert: No    Hypoglycemia symptoms  Tremors: Yes(Weak and blurry vision and shaky.)    Hypoglycemia Complications  Blackouts: No  Hospitalization: No  Nocturnal hypoglycemia: Yes  Required assistance: No  Required glucagon injection: No  Seizures: No    Reducing Risks  Reducing Risks Assessed Today: Yes  Diabetes Risks: Age over 45 years, Family History  CAD Risks: Hypertension, Male sex, Diabetes Mellitus, Family history  Has dilated eye exam at least once a year?: Yes  Sees dentist every 6 months?: No  Sees podiatrist (foot doctor)?: Yes    Healthy Coping  Healthy Coping Assessed Today: Yes  Emotional response to diabetes: Acceptance  Informal Support system:: Spouse, Family  Stage of change: ACTION (Actively working towards change)  Support resources: Offerings in Clinic Communities  Patient Activation Measure Survey Score:  No flowsheet data found.      INTERVENTION:   Diabetes knowledge and skills assessment:     Patient is knowledgeable in diabetes management concepts related to: Being Active, Monitoring and Taking Medication    Patient needs further education on the following diabetes management concepts: Healthy Eating, Being Active, Problem Solving, Reducing Risks and Healthy Coping    Based on learning assessment above, most appropriate setting for further diabetes education would be: Group class or Individual setting.    Education provided today on:  AADE Self-Care Behaviors:  Problem Solving: high blood glucose - causes, signs/symptoms,  treatment and prevention, low blood glucose - causes, signs/symptoms, treatment and prevention and carrying a carbohydrate source at all times  Reducing Risks: major complications of diabetes, prevention, early diagnostic measures and treatment of complications and A1C - goals, relating to blood glucose levels, how often to check  Healthy Coping: benefits of making appropriate lifestyle changes      Opportunities for ongoing education and support in diabetes-self management were discussed.    Pt verbalized understanding of concepts discussed and recommendations provided today.       Education Materials Provided:  Tandem TSlim X2 brochure, Rule of 15 For Treatment of Low BG, Dexcom Clarity report      PLAN  See Patient Instructions for co-developed, patient-stated behavior change goals.  AVS printed and provided to patient today. See Follow-Up section for recommended follow-up.    Bindu Spring RN, BSN, CDE  1/15/2020 2:02 PM   Time Spent: 60 minutes  Encounter Type: Individual    Any diabetes medication dose changes were made via the CDE Protocol and Collaborative Practice Agreement with the patient's primary care provider. A copy of this encounter was shared with the provider.

## 2020-02-06 PROBLEM — Z96.41 INSULIN PUMP IN PLACE: Status: ACTIVE | Noted: 2019-08-26

## 2020-02-18 ENCOUNTER — ALLIED HEALTH/NURSE VISIT (OUTPATIENT)
Dept: EDUCATION SERVICES | Facility: OTHER | Age: 55
End: 2020-02-18
Attending: FAMILY MEDICINE
Payer: COMMERCIAL

## 2020-02-18 DIAGNOSIS — E10.59 TYPE 1 DIABETES MELLITUS WITH OTHER CIRCULATORY COMPLICATION (H): Primary | ICD-10-CM

## 2020-02-18 PROCEDURE — 99211 OFF/OP EST MAY X REQ PHY/QHP: CPT | Performed by: REGISTERED NURSE

## 2020-02-19 NOTE — PATIENT INSTRUCTIONS
Diabetes Goals and Reminders    Your A1c test should be done every 3 months.  Your goal is less than 7%.   Your last result is:  8.8%, 2/10/2020.      Your LDL cholesterol test should be done at least once a year.    Your last result is:  No results found for: LDL    Have blood pressure and weight checked every three months.  Your blood pressure goal is 140/90 or less.  Your last blood pressure reading was:    BP Readings from Last 1 Encounters:   11/19/19 138/70       Use your Dexcom G6 CGM results and enter sensor glucose into your pump minimum of 4 times per day.  Your home blood glucose target ranges are:  Before meals:    2 hours after a meal:  Less than 180  Bedtime:  100-140    Avoid all tobacco.  Follow your healthy diet and exercise plan.  See the eye doctor every year.  See the dentist every six months.  Have kidney function tested yearly.    Take all medicine as prescribed.  Please let me know if you are having symptoms you don t expect or if you wish to stop any medicine.    Current Pump settings   Pump Type:  Medtronic  Insulin Type: Novolog  BASAL RATES and times:  12   AM (midnight): 1.15 units/hour  Adjusted to 1.25 unit/hour from 9:30 pm - 6:30 am.    Total Daily Basal 27.6 units per 24 hours, with adjustment now 28.4 units/24 hours.  Insulin to Carbohydrate Ratio (ICR):   1 unit per 8 grams of carbohydrate at 12:00 am.  Insulin Sensitivity:   40 mg/dl at 12:00 am.  Target Blood Glucose:   100 - 120 at 12:00 am  Active Insulin: 4 hours  Total Daily Dose: 57 units  Basal %: 48   Bolus %: 52      Follow Up Plan  Your future lab plan is:  HgA1c in May 2020.    If you need your cholesterol checked at your next appointment, you should fast 8 to 10 hours before your appointment.  Do not eat or drink anything besides water.  Drink plenty of water and take all your usual medicine.    SUMMARY FOR TODAY'S VISIT    1.  Congratulations on lowering your A1c from 12.7 to 8.8%!     2.   CGM download glucose  results:    Report shows 214 mg/dl average sensor glucose over the past two weeks.    Standard deviation (SD) is +- 92 mg/dl. Goal for SD is +-50 mg/dl or lower.       GMI (Glucose Management Indicator or HbA1c ) 8.4%.     Glucose 42% in target (70 - 180), 57% above target (181 - 400), 1% below target (56 - 69) and 0% hypo (39 - 55).       Most significant patterns of daytime highs found between 9:45 am and 11:45 am.     Most significant patterns of nighttime highs found between 10:10 pm and 6:30 am.     No significant patterns of lows found.     Plan:     1.  Due to pattern of nighttime high BG, recommend basal increase from 1.15 to 1.25 unit/hour from 9:30 pm to 6:30 am.  New Total Basal per 24 hours increased from 27.6 to 28.4 units.     2.  Per your request, no new changes in carb ratio or correction factor at this time.  Continue to try counting carb as accurately as possible to decrease risk of high glucose during the day.     3.  Follow up for continued insulin pump management in 2 - 3 weeks, or as needed.        Bindu Spring RN, BSN, CDCES, CPT  2/18/2020 3:40 PM

## 2020-02-19 NOTE — PROGRESS NOTES
"Diabetes Self-Management Education & Support    Presents for: Individual review    SUBJECTIVE/OBJECTIVE:  Presents for: Individual review  Accompanied by: Self, Spouse  Focus of Visit: CGM, Insulin Pump  Diabetes type: Type 1  Date of diagnosis: 1/1983  Disease course: Stable  How confident are you filling out medical forms by yourself:: Extremely  Transportation concerns: No  Cultural Influences/Ethnic Background:  American  Patient happy to see his HbA1c has decreased from 12.7% to 8.8%!  He shares he really likes the CGM, \"helps me keep on track.\"  He is entering CHO grams and SG into his pump more often, trying hard to improve glucose control.    Diabetes Symptoms & Complications:  Fatigue: Yes  Neuropathy: Yes  Polydipsia: No  Polyphagia: No  Polyuria: No  Visual change: No  Slow healing wounds: Yes  Symptom course: Improving  Weight trend: Stable  CVA: No  Heart disease: No  Nephropathy: Yes  Foot ulcerations: No  Retinopathy: No    Patient Problem List and Family Medical History reviewed for relevant medical history, current medical status, and diabetes risk factors.    Vitals:  There were no vitals taken for this visit.  Estimated body mass index is 30.19 kg/m  as calculated from the following:    Height as of 11/18/19: 1.93 m (6' 4\").    Weight as of 11/19/19: 112.5 kg (248 lb).   Last 3 BP:   BP Readings from Last 3 Encounters:   11/19/19 138/70   11/18/19 136/76       History   Smoking Status     Never Smoker   Smokeless Tobacco     Never Used     Comment: no ecig       Labs:  No results found for: A1C  No results found for: GLC  No results found for: LDL  No results found for: HDL]  No results found for: GFRESTIMATED  No results found for: GFRESTBLACK  No results found for: CR  No results found for: MICROALBUMIN    Healthy Eating:  Healthy Eating Assessed Today: Yes  Cultural/Episcopal diet restrictions?: No  Beverages: Diet soda, Milk, Water  Has patient met with a dietitian in the past?: Yes    Being " Active:  Being Active Assessed Today: Yes  Exercise:: Yes  Days per week of moderate to strenuous exercise (like a brisk walk): 6  On average, minutes per day of exercise at this level: 150 or greater  How intense was your typical exercise? : Light (like stretching or slow walking)(Light to moderate activity during working hours at [employer].)  Exercise Minutes per Week: 900  Barrier to exercise: None    Monitoring:  Monitoring Assessed Today: Yes  Blood Glucose Meter: CGM(Dexcom G6 CGM)    Taking Medications:  Diabetes Medication(s)     Diabetic Other       glucagon 1 MG kit    Inject 1 mg into the muscle as needed    Insulin       insulin aspart (NOVOLOG VIAL) 100 UNITS/ML vial    pump          Current Treatments: Insulin Pump  Given by: Patient  Injection/Infusion sites: Abdomen  Problems taking diabetes medications regularly?: No  Diabetes medication side effects?: No    Problem Solving:  Problem Solving Assessed Today: Yes  Hypoglycemia Frequency: Weekly  Hypoglycemia Treatment: Candy  Patient carries a carbohydrate source: Yes(Can of regular pop, cookie, sweet snack.)  Medical ID: No    Hypoglycemia symptoms  Feeling shaky: Yes(Weak and blurry vision and shaky.)    Hypoglycemia Complications  Blackouts: No  Hospitalization: No  Nocturnal hypoglycemia: Yes  Required assistance: No  Required glucagon injection: No  Seizures: No    Reducing Risks:  Reducing Risks Assessed Today: Yes  Diabetes Risks: Age over 45 years, Family History  CAD Risks: Hypertension, Male sex, Diabetes Mellitus, Family history  Has dilated eye exam at least once a year?: Yes  Sees dentist every 6 months?: No  Feet checked by healthcare provider in the last year?: Yes    Healthy Coping:  Healthy Coping Assessed Today: Yes  Emotional response to diabetes: Acceptance  Informal Support system:: Spouse, Family  Stage of change: ACTION (Actively working towards change)  Support resources: Offerings in Clinic Communities  Patient  Activation Measure Survey Score:  No flowsheet data found.    Diabetes knowledge and skills assessment:   Patient is knowledgeable in diabetes management concepts related to: Being Active, Monitoring and Taking Medication    Patient needs further education on the following diabetes management concepts: Healthy Eating, Problem Solving, Reducing Risks and Insulin Pump Concepts Carbohydrate counting - as accurate as possible to help prevent hyper/hypo-glycemia.    Based on learning assessment above, most appropriate setting for further diabetes education would be: Group class or Individual setting.    ASSESSMENT  Glucose Patterns & Trends:  Weekday Daytime hyperglycemia, Weekday Nocturnal hyperglycemia, Weekend Daytime hyperglycemia and Weekend Nocturnal hyperglycemia    REPORTS:  CGM download glucose results:    Report shows 214 mg/dl average sensor glucose over the past two weeks.    Standard deviation (SD) is +- 92 mg/dl. Goal for SD is +-50 mg/dl or lower.      GMI (Glucose Management Indicator or HbA1c ) 8.4%.    Glucose 42% in target (70 - 180), 57% above target (181 - 400), 1% below target (56 - 69) and 0% hypo (39 - 55).      Most significant patterns of daytime highs found between 9:45 am and 11:45 am.    Most significant patterns of nighttime highs found between 10:10 pm and 6:30 am.    No significant patterns of lows found.    Insulin Pump download:    Changing infusion set every 3 days.     Average daily carbohydrates: 172 +/- 62 grams  Average total daily insulin: 57.2 +/- 7.5 units  Basal: 48%  Bolus: 52%    See scanned CGM and pump upload reports for details with pump settings.      Plan:    1.  Due to pattern of nighttime hyperglycemia, recommend basal increase from 1.15 to 1.25 unit/hour from 9:30 pm to 6:30 am.  New Total Basal per 24 hours increased from 27.6 to 28.4 units.    2.  Per patient request, he would like to try counting CHO as accurately as possible to decrease risk of  hyperglycemia during the day.  Patient has adjusted his own SG Target value on his Dexcom from 200 to 180 mg/dL.  This will help alert him earlier to adjust insulin for improved glucose control.     Insulin Pump Information  Insulin Pump Brand: Medtronic  Infusion Set: Medtronic  Medtronic Infusion Set: [place]      INTERVENTIONS:  Education provided today on:  AADE Self-Care Behaviors:  Problem Solving: high blood glucose - causes, signs/symptoms, treatment and prevention, low blood glucose - causes, signs/symptoms, treatment and prevention, carrying a carbohydrate source at all times and medical identification  Reducing Risks: major complications of diabetes, prevention, early diagnostic measures and treatment of complications and A1C - goals, relating to blood glucose levels, how often to check    Education specific to insulin pump provided today on:   importance of changing infusion set every 3 days, importance of bolusing before meals, importance of counting carbohydrates accurately, benefits of post-meal blood glucose testing  and treating hypoglycemia correctly (Rule of 15)    Opportunities for ongoing education and support in diabetes-self management were discussed.    Pt verbalized understanding of concepts discussed and recommendations provided today.       Education Materials Provided:  No new materials provided today    Pump Education Materials: Tandem TSlim X2 Control IQ brochure    Patient would benefit from increase in basal rate overnight to 1.25 unit/hour.    Changes made to pump settings:  basal rate: New total basal 28.4 units per 24 hours.    Changes made to sensor settings:   Glucose Alerts: High: 180 mg/dL    PLAN  See Patient Instructions for co-developed, patient-stated behavior change goals.  AVS printed and provided to patient today. See Follow-Up section for recommended follow-up.    Bindu Spring RN, BSN, CDCES  2/18/2020 3:37 PM   Time Spent: 60 minutes  Encounter Type: Individual    Any  diabetes medication dose changes were made via the CDE Protocol and Collaborative Practice Agreement with the patient's primary care provider. A copy of this encounter was shared with the provider.

## 2020-05-11 ENCOUNTER — TELEPHONE (OUTPATIENT)
Dept: EDUCATION SERVICES | Facility: OTHER | Age: 55
End: 2020-05-11

## 2020-05-11 DIAGNOSIS — E10.59 TYPE 1 DIABETES MELLITUS WITH OTHER CIRCULATORY COMPLICATION (H): Primary | ICD-10-CM

## 2020-05-11 NOTE — TELEPHONE ENCOUNTER
Patient's wife states patient needs HbA1c lab, and is wondering if he can have this tested before his visit to establish care with Dr. Pro, 5/21.    Patient currently uses Dexcom G6 CGM. Dexcom Clarity shows 90d GMI 7.0% with 155 mg/dL average sensor glucose.  He also uses a Medtronic insulin pump and will be switching to Tandem TSlim X2 insulin pump with Control IQ Technology soon.         Last HbA1c 8.8%, 2/10/2020 per [facility].    Will send HbAlc lab request to Dr. Pro as he may request additional labs for patient.      Bindu Spring RN, BSN, [facility] [place]  5/11/2020 3:34 PM

## 2020-05-21 ENCOUNTER — OFFICE VISIT (OUTPATIENT)
Dept: PEDIATRICS | Facility: OTHER | Age: 55
End: 2020-05-21
Attending: INTERNAL MEDICINE
Payer: COMMERCIAL

## 2020-05-21 VITALS
TEMPERATURE: 96.9 F | BODY MASS INDEX: 30.32 KG/M2 | DIASTOLIC BLOOD PRESSURE: 82 MMHG | SYSTOLIC BLOOD PRESSURE: 136 MMHG | RESPIRATION RATE: 16 BRPM | WEIGHT: 256.8 LBS | HEART RATE: 82 BPM | HEIGHT: 77 IN | OXYGEN SATURATION: 96 %

## 2020-05-21 DIAGNOSIS — Z96.41 INSULIN PUMP IN PLACE: ICD-10-CM

## 2020-05-21 DIAGNOSIS — I10 BENIGN ESSENTIAL HYPERTENSION: ICD-10-CM

## 2020-05-21 DIAGNOSIS — E78.2 MIXED HYPERLIPIDEMIA: ICD-10-CM

## 2020-05-21 DIAGNOSIS — N18.30 TYPE 1 DIABETES MELLITUS WITH STAGE 3 CHRONIC KIDNEY DISEASE (H): Primary | ICD-10-CM

## 2020-05-21 DIAGNOSIS — E10.22 TYPE 1 DIABETES MELLITUS WITH STAGE 3 CHRONIC KIDNEY DISEASE (H): Primary | ICD-10-CM

## 2020-05-21 DIAGNOSIS — E10.59 TYPE 1 DIABETES MELLITUS WITH OTHER CIRCULATORY COMPLICATION (H): ICD-10-CM

## 2020-05-21 LAB
ANION GAP SERPL CALCULATED.3IONS-SCNC: 7 MMOL/L (ref 3–14)
BUN SERPL-MCNC: 26 MG/DL (ref 7–25)
CALCIUM SERPL-MCNC: 9.2 MG/DL (ref 8.6–10.3)
CHLORIDE SERPL-SCNC: 107 MMOL/L (ref 98–107)
CHOLEST SERPL-MCNC: 118 MG/DL
CO2 SERPL-SCNC: 24 MMOL/L (ref 21–31)
CREAT SERPL-MCNC: 1.41 MG/DL (ref 0.7–1.3)
GFR SERPL CREATININE-BSD FRML MDRD: 52 ML/MIN/{1.73_M2}
GLUCOSE SERPL-MCNC: 114 MG/DL (ref 70–105)
HBA1C MFR BLD: 7.2 % (ref 4–6)
HDLC SERPL-MCNC: 43 MG/DL (ref 23–92)
LDLC SERPL CALC-MCNC: 62 MG/DL
NONHDLC SERPL-MCNC: 75 MG/DL
POTASSIUM SERPL-SCNC: 5 MMOL/L (ref 3.5–5.1)
SODIUM SERPL-SCNC: 138 MMOL/L (ref 134–144)
TRIGL SERPL-MCNC: 66 MG/DL

## 2020-05-21 PROCEDURE — 83036 HEMOGLOBIN GLYCOSYLATED A1C: CPT | Mod: ZL | Performed by: INTERNAL MEDICINE

## 2020-05-21 PROCEDURE — 36415 COLL VENOUS BLD VENIPUNCTURE: CPT | Mod: ZL | Performed by: INTERNAL MEDICINE

## 2020-05-21 PROCEDURE — 99214 OFFICE O/P EST MOD 30 MIN: CPT | Performed by: INTERNAL MEDICINE

## 2020-05-21 PROCEDURE — 80061 LIPID PANEL: CPT | Mod: ZL | Performed by: INTERNAL MEDICINE

## 2020-05-21 PROCEDURE — 80048 BASIC METABOLIC PNL TOTAL CA: CPT | Mod: ZL | Performed by: INTERNAL MEDICINE

## 2020-05-21 ASSESSMENT — PAIN SCALES - GENERAL: PAINLEVEL: NO PAIN (0)

## 2020-05-21 ASSESSMENT — MIFFLIN-ST. JEOR: SCORE: 2117.22

## 2020-05-21 NOTE — PROGRESS NOTES
Previous A1C is at goal of <8  Lab Results   Component Value Date    A1C 7.2 05/21/2020     Urine microalbumin:creatine: Unknown   Foot exam: Unknown   Eye exam: last Summer/ Fall   Tobacco User: No   Patient is on a daily aspirin  Patient is on a Statin.  Blood pressure today of:     BP Readings from Last 1 Encounters:   05/21/20 136/82      is at the goal of <139/89 for diabetics.    Tamiko Burnham MA on 5/21/2020 at 8:56 AM

## 2020-05-21 NOTE — PROGRESS NOTES
"Subjective  Clemente Graham is a 55 year old male who presents for diabetes.  He has a history of diabetes mellitus type 1 which was diagnosed when he was a senior in high school 37 years ago.  He has been on insulin since that time.  He has been utilizing an insulin pump for 21 years.  He has been using a CGM since last February.  He follows regularly with Bindu from diabetes education.  His primary care physician is Dr. Wayne however he needed a physician here in order to facilitate his diabetic care.  His A1c was uncontrolled prior to starting the CGM.  He has had much better glycemic control since then.  He has a history of hyperlipidemia which is stable on atorvastatin.  History of hypertension which is stable on lisinopril and amlodipine.  He continues on aspirin for prophylaxis.    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    Family Hx:   Family History   Problem Relation Age of Onset     Cancer Mother         ovarian     Prostate Cancer Father        Objective  Vitals: reviewed in EMR.  /82   Pulse 82   Temp 96.9  F (36.1  C) (Tympanic)   Resp 16   Ht 1.956 m (6' 5\")   Wt 116.5 kg (256 lb 12.8 oz)   SpO2 96%   BMI 30.45 kg/m      Gen: Pleasant male, NAD.  HEENT: MMM  Neck: Supple  Pulm: Breathing easily  Neuro: Grossly intact  Skin: No concerning lesions.  Psychiatric: Normal affect and insight. Does not appear anxious or depressed.      Diabetes Labs  Hemoglobin A1C (%)   Date Value   05/21/2020 7.2 (H) "     Creatinine (mg/dL)   Date Value   05/21/2020 1.41 (H)     Glucose (mg/dL)   Date Value   05/21/2020 114 (H)     Potassium (mmol/L)   Date Value   05/21/2020 5.0       Assessment    ICD-10-CM    1. Type 1 diabetes mellitus with stage 3 chronic kidney disease (H)  E10.22     N18.3    2. Benign essential hypertension  I10    3. Mixed hyperlipidemia  E78.2    4. Insulin pump in place  Z96.41      Diabetes mellitus type 1 currently well controlled complicated by chronic kidney disease stage III which is stable.  I strongly recommend the ongoing use of insulin pump and continuous glucose monitoring in order to facilitate the ongoing management of diabetes mellitus type 1.    Patient currently uses a Dexcom G6 CGM for continuous monitoring of glucose.   Patient injects or boluses insulin 3 or more times daily before breakfast, before lunch, before dinner, and bedtime.  Patient requires frequent adjustments to their insulin treatment regimen on the basis of therapeutic CGM testing results.    Patient utilizes a continuous subcutaneous insulin infusion (CSII) pump.   Patient uses pump advice for frequent adjustments of aspart insulin.  Patient is and has been testing BG 4 times daily before breakfast, lunch, dinner, and bedtime over the past 60 days.         Plan  Patient Instructions     Aspects of Diabetes we can improve:  Hemoglobin A1c Lab Results   Component Value Date    A1C 7.2 05/21/2020    Goal range is under 8. Best is 6.5 to 7   Blood Pressure 136/82 Goal to keep less than 140/90   Tobacco  reports that he has never smoked. He has never used smokeless tobacco. Goal to abstain from tobacco   Aspirin yes Aspirin reduces risk of heart disease and stroke   ACE/ARB lisinopril These medications reduce risk of kidney disease   Cholesterol atorvastatin Statins reduce risk of heart disease and stroke   Eye Exam Summer 2019, Dr. Loredo Annual diabetic eye exam   Healthy weight Body mass index is 30.45 kg/m . Goal BMI  under 30, best is under 25.      -- I'm trying to exercise daily (goal at least 20 min/day) with moderate aerobic activity   -- Eat healthy (resources from ADA at http://www.diabetes.org/)   -- I'm taking good care of my feet. Consider seeing the Podiatrist   -- Check blood sugars as directed, record in log book and bring to every appointment   -- Goal sugar before breakfast: under 140   -- Goal sugar 2 hours after supper: under 170   -- Next diabetes lab draw: 3 months   -- Next diabetes office visit: 3 months        Return in about 3 months (around 8/21/2020) for diabetes.    Tello Weldon MD, FAAP, FACP  Internal Medicine & Pediatrics

## 2020-05-21 NOTE — PATIENT INSTRUCTIONS
Aspects of Diabetes we can improve:  Hemoglobin A1c Lab Results   Component Value Date    A1C 7.2 05/21/2020    Goal range is under 8. Best is 6.5 to 7   Blood Pressure 136/82 Goal to keep less than 140/90   Tobacco  reports that he has never smoked. He has never used smokeless tobacco. Goal to abstain from tobacco   Aspirin yes Aspirin reduces risk of heart disease and stroke   ACE/ARB lisinopril These medications reduce risk of kidney disease   Cholesterol atorvastatin Statins reduce risk of heart disease and stroke   Eye Exam Summer 2019, Dr. Loredo Annual diabetic eye exam   Healthy weight Body mass index is 30.45 kg/m . Goal BMI under 30, best is under 25.      -- I'm trying to exercise daily (goal at least 20 min/day) with moderate aerobic activity   -- Eat healthy (resources from ADA at http://www.diabetes.org/)   -- I'm taking good care of my feet. Consider seeing the Podiatrist   -- Check blood sugars as directed, record in log book and bring to every appointment   -- Goal sugar before breakfast: under 140   -- Goal sugar 2 hours after supper: under 170   -- Next diabetes lab draw: 3 months   -- Next diabetes office visit: 3 months

## 2020-05-21 NOTE — NURSING NOTE
"Chief Complaint   Patient presents with     Diabetes     Patient is here for a follow up on diabetes. He is needing a new insulin pump and states [employer] is suppose to be sending a fax over for a prescription in regards to a new pump.     Initial There were no vitals taken for this visit. Estimated body mass index is 30.19 kg/m  as calculated from the following:    Height as of 11/18/19: 1.93 m (6' 4\").    Weight as of 11/19/19: 112.5 kg (248 lb).  Medication Reconciliation: complete    Tamiko Burnham MA  "

## 2020-05-21 NOTE — LETTER
5/21/2020        RE: Clemente Graham  [address] [address] [id number]        Previous A1C is at goal of <8  Lab Results   Component Value Date    A1C 7.2 05/21/2020     Urine microalbumin:creatine: Unknown   Foot exam: Unknown   Eye exam: last Summer/ Fall   Tobacco User: No   Patient is on a daily aspirin  Patient is on a Statin.  Blood pressure today of:     BP Readings from Last 1 Encounters:   05/21/20 136/82      is at the goal of <139/89 for diabetics.    Tamiko Burnham MA on 5/21/2020 at 8:56 AM        Subjective  Clemente Graham is a 55 year old male who presents for diabetes.  He has a history of diabetes mellitus type 1 which was diagnosed when he was a senior in high school 37 years ago.  He has been on insulin since that time.  He has been utilizing an insulin pump for 21 years.  He has been using a CGM since last February.  He follows regularly with [facility] from diabetes education.  His primary care physician is Dr. Wayne however he needed a physician here in order to facilitate his diabetic care.  His A1c was uncontrolled prior to starting the CGM.  He has had much better glycemic control since then.  He has a history of hyperlipidemia which is stable on atorvastatin.  History of hypertension which is stable on lisinopril and amlodipine.  He continues on aspirin for prophylaxis.    Allergies: reviewed in EMR  Medications: reviewed in EMR  Problem List/PMH:reviewed in EMR    Social Hx:  Social History     Tobacco Use     Smoking status: Never Smoker     Smokeless tobacco: Never Used     Tobacco comment: no ecig   Substance Use Topics     Alcohol use: Yes     Frequency: Monthly or less     Drug use: Never     Social History     Social History Narrative    Works at [employer] as a [employer]     - wife Jeanette    1 son- Will     I reviewed social history and made relevant updates today.    Family Hx:   Family History   Problem Relation Age of Onset     Cancer  "Mother         ovarian     Prostate Cancer Father        Objective  Vitals: reviewed in EMR.  /82   Pulse 82   Temp 96.9  F (36.1  C) (Tympanic)   Resp 16   Ht 1.956 m (6' 5\")   Wt 116.5 kg (256 lb 12.8 oz)   SpO2 96%   BMI 30.45 kg/m      Gen: Pleasant male, NAD.  HEENT: MMM  Neck: Supple  Pulm: Breathing easily  Neuro: Grossly intact  Skin: No concerning lesions.  Psychiatric: Normal affect and insight. Does not appear anxious or depressed.      Diabetes Labs  Hemoglobin A1C (%)   Date Value   05/21/2020 7.2 (H)     Creatinine (mg/dL)   Date Value   05/21/2020 1.41 (H)     Glucose (mg/dL)   Date Value   05/21/2020 114 (H)     Potassium (mmol/L)   Date Value   05/21/2020 5.0       Assessment    ICD-10-CM    1. Type 1 diabetes mellitus with stage 3 chronic kidney disease (H)  E10.22     N18.3    2. Benign essential hypertension  I10    3. Mixed hyperlipidemia  E78.2    4. Insulin pump in place  Z96.41      Diabetes mellitus type 1 currently well controlled complicated by chronic kidney disease stage III which is stable.  I strongly recommend the ongoing use of insulin pump and continuous glucose monitoring in order to facilitate the ongoing management of diabetes mellitus type 1.    Patient currently uses a Dexcom G6 CGM for continuous monitoring of glucose.   Patient injects or boluses insulin 3 or more times daily before breakfast, before lunch, before dinner, and bedtime.  Patient requires frequent adjustments to their insulin treatment regimen on the basis of therapeutic CGM testing results.    Patient utilizes a continuous subcutaneous insulin infusion (CSII) pump.   Patient uses pump advice for frequent adjustments of aspart insulin.  Patient is and has been testing BG 4 times daily before breakfast, lunch, dinner, and bedtime over the past 60 days.         Plan  Patient Instructions     Aspects of Diabetes we can improve:  Hemoglobin A1c Lab Results   Component Value Date    A1C 7.2 05/21/2020    " Goal range is under 8. Best is 6.5 to 7   Blood Pressure 136/82 Goal to keep less than 140/90   Tobacco  reports that he has never smoked. He has never used smokeless tobacco. Goal to abstain from tobacco   Aspirin yes Aspirin reduces risk of heart disease and stroke   ACE/ARB lisinopril These medications reduce risk of kidney disease   Cholesterol atorvastatin Statins reduce risk of heart disease and stroke   Eye Exam Summer 2019, Dr. Loredo Annual diabetic eye exam   Healthy weight Body mass index is 30.45 kg/m . Goal BMI under 30, best is under 25.      -- I'm trying to exercise daily (goal at least 20 min/day) with moderate aerobic activity   -- Eat healthy (resources from ADA at http://www.diabetes.org/)   -- I'm taking good care of my feet. Consider seeing the Podiatrist   -- Check blood sugars as directed, record in log book and bring to every appointment   -- Goal sugar before breakfast: under 140   -- Goal sugar 2 hours after supper: under 170   -- Next diabetes lab draw: 3 months   -- Next diabetes office visit: 3 months        Return in about 3 months (around 8/21/2020) for diabetes.    Signed, Tello Pro MD, FAAP, FACP  Internal Medicine & Pediatrics        Sincerely,        Tello Pro MD

## 2020-05-26 ENCOUNTER — OFFICE VISIT (OUTPATIENT)
Dept: PEDIATRICS | Facility: OTHER | Age: 55
End: 2020-05-26
Attending: FAMILY MEDICINE
Payer: COMMERCIAL

## 2020-05-26 VITALS
SYSTOLIC BLOOD PRESSURE: 162 MMHG | OXYGEN SATURATION: 94 % | BODY MASS INDEX: 30.39 KG/M2 | TEMPERATURE: 97 F | WEIGHT: 257.4 LBS | HEIGHT: 77 IN | DIASTOLIC BLOOD PRESSURE: 84 MMHG | RESPIRATION RATE: 16 BRPM | HEART RATE: 89 BPM

## 2020-05-26 DIAGNOSIS — L03.116 CELLULITIS OF KNEE, LEFT: Primary | ICD-10-CM

## 2020-05-26 DIAGNOSIS — E10.59 TYPE 1 DIABETES MELLITUS WITH OTHER CIRCULATORY COMPLICATION (H): ICD-10-CM

## 2020-05-26 PROCEDURE — 99213 OFFICE O/P EST LOW 20 MIN: CPT | Performed by: INTERNAL MEDICINE

## 2020-05-26 RX ORDER — MUPIROCIN 20 MG/G
OINTMENT TOPICAL 3 TIMES DAILY
Qty: 30 G | Refills: 3 | Status: SHIPPED | OUTPATIENT
Start: 2020-05-26 | End: 2020-12-14

## 2020-05-26 RX ORDER — MUPIROCIN 20 MG/G
OINTMENT TOPICAL 3 TIMES DAILY
Qty: 30 G | Refills: 3 | Status: SHIPPED | OUTPATIENT
Start: 2020-05-26 | End: 2020-05-26

## 2020-05-26 RX ORDER — SULFAMETHOXAZOLE/TRIMETHOPRIM 800-160 MG
1 TABLET ORAL 2 TIMES DAILY
Qty: 10 TABLET | Refills: 0 | Status: SHIPPED | OUTPATIENT
Start: 2020-05-26 | End: 2020-08-11

## 2020-05-26 RX ORDER — SULFAMETHOXAZOLE/TRIMETHOPRIM 800-160 MG
1 TABLET ORAL 2 TIMES DAILY
Qty: 10 TABLET | Refills: 0 | Status: SHIPPED | OUTPATIENT
Start: 2020-05-26 | End: 2020-05-26

## 2020-05-26 ASSESSMENT — MIFFLIN-ST. JEOR: SCORE: 2119.94

## 2020-05-26 ASSESSMENT — PAIN SCALES - GENERAL: PAINLEVEL: MILD PAIN (2)

## 2020-05-26 NOTE — NURSING NOTE
"Chief Complaint   Patient presents with     Knee left     Patient is here for a left knee injury. He states he is not sure how he injured it but he cut it a few weeks ago and the pain is worsening.     Initial There were no vitals taken for this visit. Estimated body mass index is 30.45 kg/m  as calculated from the following:    Height as of 5/21/20: 1.956 m (6' 5\").    Weight as of 5/21/20: 116.5 kg (256 lb 12.8 oz).  Medication Reconciliation: complete    Tamiko Burnham MA  "

## 2020-05-26 NOTE — PATIENT INSTRUCTIONS
-- Bactrim x 5 days  -- Eat yogurt 1-2 times per day while on antibiotics (and for a few weeks after) to reduce the chances of diarrhea   -- Mupirocin topically until healed   -- Return if worse

## 2020-05-26 NOTE — PROGRESS NOTES
"Subjective  Clemente Graham is a 55 year old male who presents for evaluation of knee pain.  About 3 weeks ago he was working on brush when part of a tree branch fell on the left knee causing a scrape.  Everything was fine until yesterday when it started to get red and warm.  There is been no drainage or discharge.  No fever.  No chills.  Looking back over the last 3 days his blood sugars have been higher than he expected and did not know why.  He is able to move the left knee without internal discomfort.    Problem List/PMH: reviewed in EMR, and made relevant updates today.  Medications: reviewed in EMR, and made relevant updates today.  Allergies: reviewed in EMR, and made relevant updates today.    Social Hx:  Social History     Tobacco Use     Smoking status: Never Smoker     Smokeless tobacco: Never Used     Tobacco comment: no ecig   Substance Use Topics     Alcohol use: Yes     Frequency: Monthly or less     Drug use: Never     Social History     Social History Narrative    Works at [employer] as a [employer]    - wife Jeanette    1 son- Will     I reviewed social history and made relevant updates today.    Family Hx:   Family History   Problem Relation Age of Onset     Cancer Mother         ovarian     Prostate Cancer Father        Objective  Vitals: reviewed in EMR.  BP (!) 162/84   Pulse 89   Temp 97  F (36.1  C) (Tympanic)   Resp 16   Ht 1.956 m (6' 5\")   Wt 116.8 kg (257 lb 6.4 oz)   SpO2 94%   BMI 30.52 kg/m      Gen: Pleasant male, NAD.  HEENT: MMM  Neck: Supple  Pulm: Breathing easily  Neuro: Grossly intact  Skin: There is erythema and warmth of the left anterior kneecap with some swelling as identified in the photo below.  Psychiatric: Normal affect and insight. Does not appear anxious or depressed.          Assessment    ICD-10-CM    1. Cellulitis of knee, left  L03.116 mupirocin (BACTROBAN) 2 % external ointment     sulfamethoxazole-trimethoprim (BACTRIM DS) " 800-160 MG tablet     DISCONTINUED: mupirocin (BACTROBAN) 2 % external ointment     DISCONTINUED: sulfamethoxazole-trimethoprim (BACTRIM DS) 800-160 MG tablet   2. Type 1 diabetes mellitus with other circulatory complication (H)  E10.59 mupirocin (BACTROBAN) 2 % external ointment     sulfamethoxazole-trimethoprim (BACTRIM DS) 800-160 MG tablet     DISCONTINUED: mupirocin (BACTROBAN) 2 % external ointment     DISCONTINUED: sulfamethoxazole-trimethoprim (BACTRIM DS) 800-160 MG tablet     I believe he has developed a cellulitis of the left anterior knee.  There does not appear to be involvement of the knee joint itself.  It is unlikely that the bacteria was introduced 3 weeks ago, rather the scab likely had some rubbing or breakdown allowing the portal of bacteria within the last several days.    Plan   -- Expected clinical course discussed   -- Medications and their side effects discussed  Patient Instructions    -- Bactrim x 5 days  -- Eat yogurt 1-2 times per day while on antibiotics (and for a few weeks after) to reduce the chances of diarrhea   -- Mupirocin topically until healed   -- Return if worse      Signed, Tello Pro MD, FAAP, FACP  Internal Medicine [facility] Pediatrics

## 2020-06-15 DIAGNOSIS — I10 BENIGN ESSENTIAL HYPERTENSION: Primary | Chronic | ICD-10-CM

## 2020-06-17 NOTE — TELEPHONE ENCOUNTER
Routing refill request to provider for review/approval because:  Labs out of range:  creatinine      LOV: 5/21/2020    Belkys Campbell RN on 6/17/2020 at 10:50 AM

## 2020-06-18 DIAGNOSIS — E10.8 TYPE 1 DIABETES MELLITUS WITH UNSPECIFIED COMPLICATIONS (H): ICD-10-CM

## 2020-06-18 RX ORDER — LISINOPRIL 40 MG/1
TABLET ORAL
Qty: 90 TABLET | Refills: 2 | OUTPATIENT
Start: 2020-06-18

## 2020-06-19 RX ORDER — LISINOPRIL 40 MG/1
TABLET ORAL
Qty: 90 TABLET | Refills: 2 | OUTPATIENT
Start: 2020-06-19

## 2020-06-23 ENCOUNTER — TELEPHONE (OUTPATIENT)
Dept: PEDIATRICS | Facility: OTHER | Age: 55
End: 2020-06-23

## 2020-06-23 DIAGNOSIS — I10 BENIGN ESSENTIAL HYPERTENSION: ICD-10-CM

## 2020-06-23 DIAGNOSIS — E10.8 TYPE 1 DIABETES MELLITUS WITH UNSPECIFIED COMPLICATIONS (H): Primary | ICD-10-CM

## 2020-06-23 RX ORDER — LISINOPRIL 40 MG/1
40 TABLET ORAL DAILY
Qty: 30 TABLET | Refills: 0 | Status: SHIPPED | OUTPATIENT
Start: 2020-06-23 | End: 2020-07-29

## 2020-06-23 NOTE — TELEPHONE ENCOUNTER
Patient needs a refill on meds and it has been denied 2x.  Please call him - thinks its because  [facility] is not listed as his primary.   Randi Wallis on 6/23/2020 at 9:55 AM

## 2020-06-23 NOTE — TELEPHONE ENCOUNTER
Pt notified prescriptions were refilled.  Neda Blanco CMA (AAMA)......................6/23/2020  3:28 PM

## 2020-06-23 NOTE — TELEPHONE ENCOUNTER
Patient states he requested refills on Lisinopril and insulin twice now. It was denied both times due to Dr. Wayne being listed as PCP. Patient states he would like Dr. Pro to be his PCP. PCP has been updated in chart now. Orders pending. Please sign if ok.     LOV- 5/21/2020 for diabetes

## 2020-06-23 NOTE — TELEPHONE ENCOUNTER
ICD-10-CM    1. Type 1 diabetes mellitus with unspecified complications (H)  E10.8 insulin aspart (NOVOLOG VIAL) 100 UNITS/ML vial   2. Benign essential hypertension  I10 lisinopril (ZESTRIL) 40 MG tablet      Orders Placed This Encounter   Medications     lisinopril (ZESTRIL) 40 MG tablet     Sig: Take 1 tablet (40 mg) by mouth daily     Dispense:  30 tablet     Refill:  0     insulin aspart (NOVOLOG VIAL) 100 UNITS/ML vial     Sig: Use as directed. Max daily dose 70 units (pump)     Dispense:  5 vial     Refill:  0      -- Schedule med management/est care visit    Signed, Tello Pro MD, FAAP, FACP  Internal Medicine [facility] Pediatrics

## 2020-06-30 ENCOUNTER — MEDICAL CORRESPONDENCE (OUTPATIENT)
Dept: HEALTH INFORMATION MANAGEMENT | Facility: OTHER | Age: 55
End: 2020-06-30

## 2020-07-02 ENCOUNTER — ALLIED HEALTH/NURSE VISIT (OUTPATIENT)
Dept: EDUCATION SERVICES | Facility: OTHER | Age: 55
End: 2020-07-02
Attending: INTERNAL MEDICINE
Payer: COMMERCIAL

## 2020-07-02 DIAGNOSIS — N18.30 TYPE 1 DIABETES MELLITUS WITH STAGE 3 CHRONIC KIDNEY DISEASE (H): Primary | Chronic | ICD-10-CM

## 2020-07-02 DIAGNOSIS — E10.22 TYPE 1 DIABETES MELLITUS WITH STAGE 3 CHRONIC KIDNEY DISEASE (H): Primary | Chronic | ICD-10-CM

## 2020-07-02 PROCEDURE — 99211 OFF/OP EST MAY X REQ PHY/QHP: CPT | Performed by: REGISTERED NURSE

## 2020-07-02 NOTE — PATIENT INSTRUCTIONS
Diabetes Goals and Reminders    Your A1c test should be done every 3 months.  Your goal is less than 7%.   Your last result is:  Lab Results   Component Value Date    A1C 7.2 05/21/2020       Your LDL cholesterol test should be done at least once a year.    Your last result is:  LDL Cholesterol Calculated   Date Value Ref Range Status   05/21/2020 62 <100 mg/dL Final     Comment:     Desirable:       <100 mg/dl       Have blood pressure and weight checked every three months.  Your blood pressure goal is 140/90 or less.  Your last blood pressure reading was:    BP Readings from Last 1 Encounters:   05/26/20 (!) 162/84       Use your Dexcom G6 CGM and enter readings or test your blood sugar minimum of 4 times per day.  Your home blood glucose target ranges are:  Before meals:    2 hours after a meal:  Less than 180  Bedtime:  100-140    Avoid all tobacco.  Follow your healthy diet and exercise plan.  See the eye doctor every year.  See the dentist every six months.  Have kidney function tested yearly.    Take all medicine as prescribed.  Please let me know if you are having symptoms you don t expect or if you wish to stop any medicine.    Current Pump settings   Pump Type:  [employer] TSlim X2 with Control IQ Technology.  Insulin Type: Novolog  BASAL RATES and times:  12   AM (midnight): 1.25 units/hour    6:30 AM 1.15 units/hour   10:30 AM  1.25 units/hour   Total Basal per 24 hours 28.4 units.  BOLUS settings:  Insulin to Carbohydrate Ratio (ICR):   1 unit per 8 grams of carbohydrate at 0:00.  Insulin Sensitivity:   40 mg/dl at 0:00.  Target Blood Glucose (mg/dL):   110 at 0:00 and Per Control .5 - 160 mg/dL.  Active Insulin: 4 hours and Per Control IQ 5 hours  Total Daily Dose: 47 units  Basal %: 61   Bolus %: 39    Follow Up Plan  Your future lab plan is:  HgA1c in August 2020.    If you need your cholesterol checked at your next appointment, you should fast 8 to 10 hours before your appointment.  Do not  eat or drink anything besides water.  Drink plenty of water and take all your usual medicine.    SUMMARY FOR TODAY'S VISIT    1.  Reviewed basic pump therapy concepts such as Basal/bolus therapy, insulin action time, insulin on board, insulin to carb ratio, correction factor, target BG and meal bolus/correction bolus.     2.  Begin using your new [employer] TSlim X2 pump with Basal-IQ Technology utilizing Dexcom G6 CGM.  Insulin pump settings adjusted to help gain confidence in your pump so you can begin enter glucose into pump for bolus advice.       3. Please call [employer] technical support with any malfunction, 24/7 at [phone].  This phone number is on the back of your pump as well as in Pump Info inside your pump.     3.  Please call [facility] on Monday, 7/6, and if you have any questions or concerns.  [phone]     4.  Please follow up for insulin pump management, in 2 - 3 weeks for upload and review of settings.        Bindu Spring RN, BSN, CDCES, CPT  7/2/2020 11:43 AM

## 2020-07-02 NOTE — PROGRESS NOTES
"Diabetes Self-Management Education & Support    Presents for: Insulin Pump Start    SUBJECTIVE/OBJECTIVE:  Presents for: Insulin Pump Start  Accompanied by: Self  Focus of Visit: CGM, Insulin Pump  Type of Pump visit: Pump Start  Diabetes type: Type 1  Date of diagnosis: 1/1983  Disease course: Stable  How confident are you filling out medical forms by yourself:: Extremely  Transportation concerns: No  Difficulty affording diabetes medication?: No  Difficulty affording diabetes testing supplies?: No  Cultural Influences/Ethnic Background:  American  Patient arrives with his Tandem TSlim X2 pump, with Control IQ Technology, for training.  Patient states he is doing well with glucose control settings on his current insulin pump.     Diabetes Symptoms & Complications:  Fatigue: Yes  Neuropathy: Yes  Polydipsia: No  Polyphagia: No  Polyuria: No  Visual change: No  Slow healing wounds: Yes  Symptom course: Improving  Weight trend: Stable  CVA: No  Heart disease: No  Nephropathy: Yes  Foot ulcerations: No  Retinopathy: No    Patient Problem List and Family Medical History reviewed for relevant medical history, current medical status, and diabetes risk factors.    Vitals:  There were no vitals taken for this visit.  Estimated body mass index is 30.52 kg/m  as calculated from the following:    Height as of 5/26/20: 1.956 m (6' 5\").    Weight as of 5/26/20: 116.8 kg (257 lb 6.4 oz).   Last 3 BP:   BP Readings from Last 3 Encounters:   05/26/20 (!) 162/84   05/21/20 136/82   11/19/19 138/70       History   Smoking Status     Never Smoker   Smokeless Tobacco     Never Used     Comment: no ecig       Labs:  Lab Results   Component Value Date    A1C 7.2 05/21/2020     Lab Results   Component Value Date     05/21/2020     Lab Results   Component Value Date    LDL 62 05/21/2020     HDL Cholesterol   Date Value Ref Range Status   05/21/2020 43 23 - 92 mg/dL Final   ]  GFR Estimate   Date Value Ref Range Status   05/21/2020 " 52 (L) >60 mL/min/[1.73_m2] Final     GFR Estimate If Black   Date Value Ref Range Status   05/21/2020 63 >60 mL/min/[1.73_m2] Final     Lab Results   Component Value Date    CR 1.41 05/21/2020     No results found for: MICROALBUMIN    Healthy Eating:  Healthy Eating Assessed Today: Yes  Cultural/Confucianist diet restrictions?: No  Beverages: Diet soda, Milk, Water  Has patient met with a dietitian in the past?: Yes    Being Active:  Being Active Assessed Today: Yes  Exercise:: Yes  Days per week of moderate to strenuous exercise (like a brisk walk): 6  On average, minutes per day of exercise at this level: 150 or greater  How intense was your typical exercise? : Light (like stretching or slow walking)(Light to moderate activity during working hours at [employer].)  Exercise Minutes per Week: 900  Barrier to exercise: None    Monitoring:  Monitoring Assessed Today: Yes  Blood Glucose Meter: CGM(Dexcom G6 CGM)    Average sensor glucose 163 mg/dL.    Taking Medications:  Diabetes Medication(s)     Diabetic Other       glucagon 1 MG kit    Inject 1 mg into the muscle as needed    Insulin       insulin aspart (NOVOLOG VIAL) 100 UNITS/ML vial    Use as directed. Max daily dose 70 units (pump)          Current Treatments: Insulin Pump  Given by: Patient  Injection/Infusion sites: Abdomen  Problems taking diabetes medications regularly?: No  Diabetes medication side effects?: No    Problem Solving:  Problem Solving Assessed Today: Yes  Hypoglycemia Frequency: Weekly  Hypoglycemia Treatment: Candy  Patient carries a carbohydrate source: Yes(Can of regular pop, cookie, sweet snack.)  Medical ID: No    Hypoglycemia symptoms  Feeling shaky: Yes(Weak and blurry vision and shaky.)    Hypoglycemia Complications  Blackouts: No  Hospitalization: No  Nocturnal hypoglycemia: Yes  Required assistance: No  Required glucagon injection: No  Seizures: No    Reducing Risks:  Reducing Risks Assessed Today: Yes  Diabetes Risks: Age over  45 years, Family History  CAD Risks: Hypertension, Male sex, Diabetes Mellitus, Family history  Has dilated eye exam at least once a year?: Yes  Sees dentist every 6 months?: No  Feet checked by healthcare provider in the last year?: Yes    Healthy Coping:  Healthy Coping Assessed Today: Yes  Emotional response to diabetes: Acceptance  Informal Support system:: Spouse, Family  Stage of change: ACTION (Actively working towards change)  Support resources: Offerings in Clinic Communities  Patient Activation Measure Survey Score:  No flowsheet data found.    Diabetes knowledge and skills assessment:   Patient is knowledgeable in diabetes management concepts related to: Healthy Eating, Being Active, Monitoring, Taking Medication, Problem Solving and Reducing Risks    Patient needs further education on the following diabetes management concepts: Insulin Pump Concepts Hands on practice with basic pump button use with new Tandem pump.    Based on learning assessment above, most appropriate setting for further diabetes education would be: Group class or Individual setting.      INTERVENTIONS:    Education provided today on:  AADE Self-Care Behaviors:  Tandem TSlim X2 insulin pump training instructions.    Insulin Pump Training:   T:slim X2 Insulin Delivery System    BG at beginning of appointment: 143 mg/dL  BG at end of appointment: 128 mg/dL  Last basal insulin dose: N/A, patient utilizing insulin pump therapy.  Last bolus insulin dose: 4 units, 6:30 am.    Pump overview:  touch screen and general navigation, rechargeable battery    Home screen/ menu:  Status, battery life, CGM icons, units remaining, time/date, IOB (insulin on board),   Options - profiles, settings, suspend/resume insulin, history, temp basal, alerts   Bolus - food and correction calculator, extended bolus, reverse correction    Personal profile:  Timed settings: basal rate, correction factor, I:C ratio, BG target, edit, duplicate or review  Bolus  settings: duration of insulin action, max bolus  Enter carbs into bolus calculator:  Yes     Dexcom CGM:  Entering transmitter ID and sensor code  Urgent Low alert: Fixed at 55 mg/dl    Control IQ feature ON - (Pump suspends at 70 mg/dl if sensor glucose is predicted to be below 70 mg/dL; pump decreases basal if sensor glucose is predicted to be below 112.5 mg/dL; pump increases basal insulin if sensor glucose is predicted to be above 160 mg/dL; pump gives automatic correction bolus if sensor glucose is predicted to be above 180 mg/dL)    Infusion set:  Loading cartridge- minimum and maximum amounts, site selection and prep, tubing and canula fills, change set every 2-3 days    Safety:  troubleshooting, low reservoir, alerts and alarms, importance of back-up plan, hypoglycemia, sick day management, hyperglycemia and DKA prevention    T:Connect: uploading pump: No, no computer availability at home.     Additional topics: 24/7 Customer Care helpline [phone], removal for xray, CT, and MRI, back up plan, when and how to order pump supplies, when to call a healthcare provider.     Education materials provided to patient:  T:Slim Diabetes Care packet , Menu Map  Infusion Set Options  Bolus Quick Reference Guide  Basal Quick Reference Guide  Tips to remember after starting an insulin pump  Tandem Help Sheets:  Basal IQ Technology, Bolus, View Status, Personal Profile, Load a Cartridge      ASSESSMENT:    Diabetes Education review given for treatment of hypoglycemia and hyperglycemia and importance of ketone testing if BG greater than 250 mg/dL, (two readings in a row).  Reviewed CHO counting and exercise.     Reviewed pump therapy concepts:  Basal/bolus therapy, insulin action time, insulin on board, insulin to carb ratio, correction factor, target BG and meal bolus/correction bolus.       Guided patient as he entered program settings into his pump for bolus advice and basal delivery.  Patient practiced using BOLUS  advice feature on the pump without difficulty, entering CHO grams into pump.     Patient followed steps, filled cartridge with insulin and started basal insulin delivery.      Encouraged patient to follow up tomorrow or Monday and schedule follow up pump visit in the next two weeks.  Encouraged patient to call if any questions or concerns.        Insulin pump was programmed according to Pump Start Orders signed by Dr. Tello Pro - see scanned pump orders dated 6/30/2020.       Pt verbalized understanding of concepts discussed and recommendations provided today. Patient was able to start insulin pump without difficulty. Yes         PLAN  See Patient Instructions for co-developed, patient-stated behavior change goals.  AVS printed and provided to patient today. See Follow-Up section for recommended follow-up.    Bindu Spring RN, BSN, [facility]  7/2/2020 11:43 AM   Time Spent: 100 minutes  Encounter Type: Individual    Any diabetes medication dose changes were made via the CDE Protocol and Collaborative Practice Agreement with the patient's primary care provider. A copy of this encounter was shared with the provider.

## 2020-07-27 DIAGNOSIS — I10 BENIGN ESSENTIAL HYPERTENSION: ICD-10-CM

## 2020-07-29 NOTE — TELEPHONE ENCOUNTER
Routing refill request to provider for review/approval because:   Blood pressure under 140/90 in past 12 months Protocol Details    Normal serum creatinine on file in past 12 months      LOV; 5/26/2020  Belkys Campbell RN on 7/29/2020 at 2:43 PM

## 2020-07-30 ENCOUNTER — TELEPHONE (OUTPATIENT)
Dept: PEDIATRICS | Facility: OTHER | Age: 55
End: 2020-07-30

## 2020-07-30 DIAGNOSIS — I10 ESSENTIAL (PRIMARY) HYPERTENSION: ICD-10-CM

## 2020-07-30 RX ORDER — LISINOPRIL 40 MG/1
TABLET ORAL
Qty: 90 TABLET | Refills: 2 | Status: SHIPPED | OUTPATIENT
Start: 2020-07-30 | End: 2020-08-11

## 2020-07-30 RX ORDER — AMLODIPINE BESYLATE 5 MG/1
TABLET ORAL
Qty: 90 TABLET | Refills: 3 | Status: SHIPPED | OUTPATIENT
Start: 2020-07-30 | End: 2020-08-11

## 2020-07-30 NOTE — TELEPHONE ENCOUNTER
Jeanette informed refill request was received from pharmacy. Refill request encounter sent to Dr. Pro.     Tamiko Burnham CMA on 7/30/2020 at 4:31 PM

## 2020-07-30 NOTE — TELEPHONE ENCOUNTER
Patients wife called asking for refill of Amlodipine  - was getting it thru Dr Wayne at [facility] but now wants it thru [facility].  Unable to get thru [facility] anymore - Please call to advise. Need soon- will be out before px appt.   Randi Wallis on 7/30/2020 at 4:15 PM

## 2020-08-10 ENCOUNTER — TELEPHONE (OUTPATIENT)
Dept: EDUCATION SERVICES | Facility: OTHER | Age: 55
End: 2020-08-10

## 2020-08-10 NOTE — TELEPHONE ENCOUNTER
Patient states his pump broke and Tandem replaced with a new pump, but he needs help to get his current settings entered in.    He will be visiting Dr. Pro tomorrow and asked if [facility] would transfer settings while he is at [facility].    Patient will bring new pump to unit 3 tomorrow and DB[facility] will enter settings while patient is visiting with Dr. Pro.    Bindu Spring RN, BSN, [facility]  8/10/2020 8:56 AM

## 2020-08-11 ENCOUNTER — OFFICE VISIT (OUTPATIENT)
Dept: PEDIATRICS | Facility: OTHER | Age: 55
End: 2020-08-11
Attending: INTERNAL MEDICINE
Payer: COMMERCIAL

## 2020-08-11 VITALS
SYSTOLIC BLOOD PRESSURE: 162 MMHG | OXYGEN SATURATION: 98 % | DIASTOLIC BLOOD PRESSURE: 84 MMHG | TEMPERATURE: 97.4 F | BODY MASS INDEX: 31.07 KG/M2 | HEART RATE: 85 BPM | WEIGHT: 262 LBS | RESPIRATION RATE: 16 BRPM

## 2020-08-11 DIAGNOSIS — Z12.11 SCREEN FOR COLON CANCER: ICD-10-CM

## 2020-08-11 DIAGNOSIS — Z96.41 INSULIN PUMP IN PLACE: Chronic | ICD-10-CM

## 2020-08-11 DIAGNOSIS — E11.628 DIABETIC FOOT INFECTION (H): ICD-10-CM

## 2020-08-11 DIAGNOSIS — N18.30 TYPE 1 DIABETES MELLITUS WITH STAGE 3 CHRONIC KIDNEY DISEASE (H): Chronic | ICD-10-CM

## 2020-08-11 DIAGNOSIS — Z11.4 ENCOUNTER FOR SCREENING FOR HIV: ICD-10-CM

## 2020-08-11 DIAGNOSIS — Z11.59 NEED FOR HEPATITIS C SCREENING TEST: ICD-10-CM

## 2020-08-11 DIAGNOSIS — N18.30 CHRONIC KIDNEY DISEASE, STAGE III (MODERATE) (H): ICD-10-CM

## 2020-08-11 DIAGNOSIS — E10.22 TYPE 1 DIABETES MELLITUS WITH STAGE 3 CHRONIC KIDNEY DISEASE (H): Chronic | ICD-10-CM

## 2020-08-11 DIAGNOSIS — I10 BENIGN ESSENTIAL HYPERTENSION: ICD-10-CM

## 2020-08-11 DIAGNOSIS — E11.42 DIABETIC PERIPHERAL NEUROPATHY (H): ICD-10-CM

## 2020-08-11 DIAGNOSIS — L08.9 DIABETIC FOOT INFECTION (H): ICD-10-CM

## 2020-08-11 DIAGNOSIS — S98.111A: ICD-10-CM

## 2020-08-11 DIAGNOSIS — Z00.00 ANNUAL PHYSICAL EXAM: Primary | ICD-10-CM

## 2020-08-11 DIAGNOSIS — E78.2 MIXED HYPERLIPIDEMIA: Chronic | ICD-10-CM

## 2020-08-11 PROBLEM — L97.529: Status: RESOLVED | Noted: 2019-11-18 | Resolved: 2020-08-11

## 2020-08-11 PROBLEM — E10.628 TYPE 1 DIABETES MELLITUS WITH DIABETIC FOOT INFECTION (H): Status: RESOLVED | Noted: 2019-11-19 | Resolved: 2020-08-11

## 2020-08-11 PROBLEM — E10.621: Status: RESOLVED | Noted: 2019-11-18 | Resolved: 2020-08-11

## 2020-08-11 PROBLEM — E10.59 TYPE 1 DIABETES MELLITUS WITH CIRCULATORY COMPLICATION (H): Chronic | Status: ACTIVE | Noted: 2019-11-18

## 2020-08-11 LAB
ANION GAP SERPL CALCULATED.3IONS-SCNC: 5 MMOL/L (ref 3–14)
BUN SERPL-MCNC: 21 MG/DL (ref 7–25)
CALCIUM SERPL-MCNC: 8.9 MG/DL (ref 8.6–10.3)
CHLORIDE SERPL-SCNC: 107 MMOL/L (ref 98–107)
CO2 SERPL-SCNC: 26 MMOL/L (ref 21–31)
CREAT SERPL-MCNC: 1.19 MG/DL (ref 0.7–1.3)
GFR SERPL CREATININE-BSD FRML MDRD: 63 ML/MIN/{1.73_M2}
GLUCOSE SERPL-MCNC: 148 MG/DL (ref 70–105)
POTASSIUM SERPL-SCNC: 4.5 MMOL/L (ref 3.5–5.1)
SODIUM SERPL-SCNC: 138 MMOL/L (ref 134–144)

## 2020-08-11 PROCEDURE — 99396 PREV VISIT EST AGE 40-64: CPT | Performed by: INTERNAL MEDICINE

## 2020-08-11 PROCEDURE — 36415 COLL VENOUS BLD VENIPUNCTURE: CPT | Mod: ZL | Performed by: INTERNAL MEDICINE

## 2020-08-11 PROCEDURE — 87389 HIV-1 AG W/HIV-1&-2 AB AG IA: CPT | Mod: ZL | Performed by: INTERNAL MEDICINE

## 2020-08-11 PROCEDURE — 80048 BASIC METABOLIC PNL TOTAL CA: CPT | Mod: ZL | Performed by: INTERNAL MEDICINE

## 2020-08-11 PROCEDURE — 86803 HEPATITIS C AB TEST: CPT | Mod: ZL | Performed by: INTERNAL MEDICINE

## 2020-08-11 RX ORDER — LISINOPRIL 40 MG/1
40 TABLET ORAL DAILY
Qty: 90 TABLET | Refills: 3 | Status: SHIPPED | OUTPATIENT
Start: 2020-08-11 | End: 2021-04-08

## 2020-08-11 RX ORDER — ATORVASTATIN CALCIUM 40 MG/1
40 TABLET, FILM COATED ORAL DAILY
Qty: 90 TABLET | Refills: 3 | Status: SHIPPED | OUTPATIENT
Start: 2020-08-11 | End: 2021-04-08

## 2020-08-11 RX ORDER — AMLODIPINE BESYLATE 10 MG/1
10 TABLET ORAL DAILY
Qty: 90 TABLET | Refills: 3 | Status: SHIPPED | OUTPATIENT
Start: 2020-08-11 | End: 2020-09-08

## 2020-08-11 ASSESSMENT — PAIN SCALES - GENERAL: PAINLEVEL: NO PAIN (0)

## 2020-08-11 NOTE — LETTER
August 13, 2020      Clemente Graham  [address] [address] [address]        Dear ,    We are writing to inform you of your test results.    Great news, you do not have hepatitis C or HIV. Kidney panel looks good.    Signed, Tello Pro MD, FAAP, FACP  Internal Medicine & Pediatrics      Resulted Orders   Basic metabolic panel   Result Value Ref Range    Sodium 138 134 - 144 mmol/L    Potassium 4.5 3.5 - 5.1 mmol/L    Chloride 107 98 - 107 mmol/L    Carbon Dioxide 26 21 - 31 mmol/L    Anion Gap 5 3 - 14 mmol/L    Glucose 148 (H) 70 - 105 mg/dL    Urea Nitrogen 21 7 - 25 mg/dL    Creatinine 1.19 0.70 - 1.30 mg/dL    GFR Estimate 63 >60 mL/min/[1.73_m2]    GFR Estimate If Black 77 >60 mL/min/[1.73_m2]    Calcium 8.9 8.6 - 10.3 mg/dL   HIV Antigen Antibody Combo   Result Value Ref Range    HIV Antigen Antibody Combo Nonreactive NR^Nonreactive          Comment:      HIV-1 p24 Ag & HIV-1/HIV-2 Ab Not Detected   Hepatitis C Screen Reflex to HCV RNA Quant and Genotype   Result Value Ref Range    Hepatitis C Antibody Nonreactive NR^Nonreactive      Comment:      Assay performance characteristics have not been established for newborns,   infants, and children         If you have any questions or concerns, please call the clinic at the number listed above.       Sincerely,        Tello Pro MD

## 2020-08-11 NOTE — PROGRESS NOTES
Subjective  Clemente Graham is a 55 year old male who presents for annual physical.  He has a history of diabetes mellitus type 1 which is well controlled with CGM and insulin pump.  His CGM reveals an estimated 30-day average A1c of 7.5.  His only issue is that his pump broke.  He is using his old pump.  He has been in touch with our diabetes educator and is working on getting a replacement.  He has a history of hypertension which is poorly controlled with lisinopril and amlodipine.  He has been taking his medications as prescribed.  No major lifestyle changes.  Does not drink alcohol or caffeine to excess.  No change in exercise.  History of hyperlipidemia which is stable with Lipitor.  He has had diabetic foot infections in the past and is status post amputation of the right great distal toe.  No chest pains with exertion.    Problem List/PMH: reviewed in EMR, and made relevant updates today.  Medications: reviewed in EMR, and made relevant updates today.  Allergies: reviewed in EMR, and made relevant updates today.    Social Hx:  Social History     Tobacco Use     Smoking status: Never Smoker     Smokeless tobacco: Never Used     Tobacco comment: no ecig   Substance Use Topics     Alcohol use: Yes     Frequency: Monthly or less     Drug use: Never     Social History     Social History Narrative    Works at [employer] as a [employer]    - wife Jeanette    1 son- Will     I reviewed social history and made relevant updates today.    Family Hx:   Family History   Problem Relation Age of Onset     Cancer Mother         ovarian     Prostate Cancer Father 65     Cancer Brother 50        had colon removed, ?colon cancer       Objective  Vitals: reviewed in EMR.  BP (!) 162/84 (BP Location: Right arm, Patient Position: Sitting, Cuff Size: Adult Regular)   Pulse 85   Temp 97.4  F (36.3  C) (Tympanic)   Resp 16   Wt 118.8 kg (262 lb)   SpO2 98%   BMI 31.07 kg/m      Gen: Pleasant male,  NAD.  HEENT: MMM, no OP erythema.   Neck: Supple, no JVD, no bruits.  CV: RRR no m/r/g.   Pulm: CTAB no w/r/r  Neuro: Grossly intact  Msk: No lower extremity edema.  Skin: No concerning lesions.  Psychiatric: Normal affect and insight. Does not appear anxious or depressed.    Foot Exam:  8/11/2020  Lack of sensation to the mid shin bilaterally.  There is a Band-Aid on the left pinky toe.  No erythema is visible.      Labs:  Lab Results   Component Value Date    CHOL 118 05/21/2020    TRIG 66 05/21/2020    HDL 43 05/21/2020     05/21/2020    BUN 26 (H) 05/21/2020    CO2 24 05/21/2020       Glucose   Date Value Ref Range Status   05/21/2020 114 (H) 70 - 105 mg/dL Final     Hemoglobin A1C   Date Value Ref Range Status   05/21/2020 7.2 (H) 4.0 - 6.0 % Final     Creatinine   Date Value Ref Range Status   05/21/2020 1.41 (H) 0.70 - 1.30 mg/dL Final     LDL Cholesterol Calculated   Date Value Ref Range Status   05/21/2020 62 <100 mg/dL Final     Comment:     Desirable:       <100 mg/dl     No results found for: [facility]            Assessment    ICD-10-CM    1. Annual physical exam  Z00.00    2. Need for hepatitis C screening test  Z11.59 Hepatitis C Screen Reflex to HCV RNA Quant and Genotype     Hepatitis C Screen Reflex to HCV RNA Quant and Genotype   3. Encounter for screening for HIV  Z11.4 HIV Antigen Antibody Combo     HIV Antigen Antibody Combo   4. Screen for colon cancer  Z12.11    5. Benign essential hypertension  I10 order for DME     lisinopril (ZESTRIL) 40 MG tablet   6. Type 1 diabetes mellitus with stage 3 chronic kidney disease (H)  E10.22 Basic metabolic panel    N18.3 Basic metabolic panel   7. Insulin pump in place  Z96.41    8. Mixed hyperlipidemia  E78.2 atorvastatin (LIPITOR) 40 MG tablet   9. Chronic kidney disease, stage III (moderate) (H)  N18.3 Basic metabolic panel     Basic metabolic panel   10. Diabetic peripheral neuropathy (H)  E11.42    11. Lower limb amputation, great toe, right (H)   S98.111A    12. Hx diabetic foot infection  E11.628     L08.9      Orders Placed This Encounter   Procedures     Hepatitis C Screen Reflex to HCV RNA Quant and Genotype     HIV Antigen Antibody Combo     Basic metabolic panel       His blood pressure is uncontrolled at this time.  We discussed options and decided on increasing amlodipine.  I would like to see that his kidney function has remained reduced but stable.  Certainly progression of kidney disease could affect blood pressure.  Recommended lifestyle modification including daily exercise.    Plan   -- Expected clinical course discussed   -- Medications and their side effects discussed  Patient Instructions      -- Increase amlodipine to 10 mg   -- Get the new shingles vaccine series from a nurse-only visit, pharmacy or [facility] ([facility] RN).      Check your Blood Pressure   -- Sit in a chair, feet flat on the floor for 5 minutes   -- Avoid caffeine, exercise and smoking for 30 minutes before checking   -- Have your arm at the level of your heart   -- Make sure you have the correct size cuff   -- Write them down, bring your log book in to your appointment     -- Goal blood pressure 120/70   -- Learn about DASH Diet ([email or web address] - redirects to the [facility]) for dietary ways to reduce blood pressure   -- Work on 5% weight loss   -- Daily physical exercise (eg. 30 min brisk walk)     Aspects of Diabetes we can improve:  Hemoglobin A1c Lab Results   Component Value Date    A1C 7.2 05/21/2020    Goal range is under 8. Best is 6.5 to 7   Blood Pressure 162/84 Goal to keep less than 140/90   Tobacco  reports that he has never smoked. He has never used smokeless tobacco. Goal to abstain from tobacco   Aspirin yes Aspirin reduces risk of heart disease and stroke   ACE/ARB yes These medications reduce risk of kidney disease   Cholesterol yes Statins reduce risk of heart disease and stroke   Eye Exam due Annual diabetic eye exam   Healthy weight Body  mass index is 31.07 kg/m . Goal BMI under 30, best is under 25.      -- I'm trying to exercise daily (goal at least 20 min/day) with moderate aerobic activity   -- Eat healthy (resources from ADA at http://www.diabetes.org/)   -- I'm taking good care of my feet. Consider seeing the Podiatrist   -- Check blood sugars as directed, record in log book and bring to every appointment   -- Goal sugar before breakfast: under 140   -- Goal sugar 2 hours after supper: under 170   -- Next diabetes lab draw: 6 months   -- Next diabetes office visit: 6 months            Return in about 6 months (around 2/11/2021) for medication management, diabetes.    Tello Weldon MD, FAAP, FACP  Internal Medicine & Pediatrics

## 2020-08-11 NOTE — PROGRESS NOTES
Previous A1C is at goal of <8  Lab Results   Component Value Date    A1C 7.2 05/21/2020     Urine microalbumin:creatine: N/A  Foot exam DUE   Eye exam DUE     Tobacco User NO   Patient is on a daily aspirin  Patient is on a Statin.  Blood pressure today of:     BP Readings from Last 1 Encounters:   08/11/20 (!) 172/88      is not at the goal of <139/89 for diabetics.    Tamiko Burnham MA on 8/11/2020 at 8:21 AM

## 2020-08-11 NOTE — NURSING NOTE
"Chief Complaint   Patient presents with     Physical     Patient is here for a physical exam     Initial BP (!) 172/88 (BP Location: Right arm, Patient Position: Sitting, Cuff Size: Adult Regular)   Pulse 85   Temp 97.4  F (36.3  C) (Tympanic)   Resp 16   Wt 118.8 kg (262 lb)   SpO2 98%   BMI 31.07 kg/m   Estimated body mass index is 31.07 kg/m  as calculated from the following:    Height as of 5/26/20: 1.956 m (6' 5\").    Weight as of this encounter: 118.8 kg (262 lb).  Medication Reconciliation: complete    Tamiko Burnham MA  "

## 2020-08-11 NOTE — PATIENT INSTRUCTIONS
-- Increase amlodipine to 10 mg   -- Get the new shingles vaccine series from a nurse-only visit, pharmacy or [facility] ([facility] RN).      Check your Blood Pressure   -- Sit in a chair, feet flat on the floor for 5 minutes   -- Avoid caffeine, exercise and smoking for 30 minutes before checking   -- Have your arm at the level of your heart   -- Make sure you have the correct size cuff   -- Write them down, bring your log book in to your appointment     -- Goal blood pressure 120/70   -- Learn about DASH Diet ([email or web address] - redirects to the [facility]) for dietary ways to reduce blood pressure   -- Work on 5% weight loss   -- Daily physical exercise (eg. 30 min brisk walk)     Aspects of Diabetes we can improve:  Hemoglobin A1c Lab Results   Component Value Date    A1C 7.2 05/21/2020    Goal range is under 8. Best is 6.5 to 7   Blood Pressure 162/84 Goal to keep less than 140/90   Tobacco  reports that he has never smoked. He has never used smokeless tobacco. Goal to abstain from tobacco   Aspirin yes Aspirin reduces risk of heart disease and stroke   ACE/ARB yes These medications reduce risk of kidney disease   Cholesterol yes Statins reduce risk of heart disease and stroke   Eye Exam due Annual diabetic eye exam   Healthy weight Body mass index is 31.07 kg/m . Goal BMI under 30, best is under 25.      -- I'm trying to exercise daily (goal at least 20 min/day) with moderate aerobic activity   -- Eat healthy (resources from ADA at http://www.diabetes.org/)   -- I'm taking good care of my feet. Consider seeing the Podiatrist   -- Check blood sugars as directed, record in log book and bring to every appointment   -- Goal sugar before breakfast: under 140   -- Goal sugar 2 hours after supper: under 170   -- Next diabetes lab draw: 6 months   -- Next diabetes office visit: 6 months

## 2020-08-12 LAB
HCV AB SERPL QL IA: NONREACTIVE
HIV 1+2 AB+HIV1 P24 AG SERPL QL IA: NONREACTIVE

## 2020-09-08 ENCOUNTER — OFFICE VISIT (OUTPATIENT)
Dept: PEDIATRICS | Facility: OTHER | Age: 55
End: 2020-09-08
Attending: INTERNAL MEDICINE
Payer: COMMERCIAL

## 2020-09-08 VITALS
SYSTOLIC BLOOD PRESSURE: 176 MMHG | RESPIRATION RATE: 16 BRPM | TEMPERATURE: 96.8 F | BODY MASS INDEX: 33.63 KG/M2 | OXYGEN SATURATION: 96 % | HEART RATE: 86 BPM | DIASTOLIC BLOOD PRESSURE: 90 MMHG | WEIGHT: 283.6 LBS

## 2020-09-08 DIAGNOSIS — I87.8 VENOUS STASIS OF BOTH LOWER EXTREMITIES: ICD-10-CM

## 2020-09-08 DIAGNOSIS — I10 ESSENTIAL HYPERTENSION: ICD-10-CM

## 2020-09-08 DIAGNOSIS — H10.33 ACUTE CONJUNCTIVITIS OF BOTH EYES, UNSPECIFIED ACUTE CONJUNCTIVITIS TYPE: Primary | ICD-10-CM

## 2020-09-08 PROCEDURE — 99214 OFFICE O/P EST MOD 30 MIN: CPT | Performed by: INTERNAL MEDICINE

## 2020-09-08 RX ORDER — AMLODIPINE BESYLATE 5 MG/1
5 TABLET ORAL DAILY
Qty: 90 TABLET | Refills: 3 | Status: SHIPPED | OUTPATIENT
Start: 2020-09-08 | End: 2021-04-08

## 2020-09-08 RX ORDER — LORATADINE 10 MG/1
10 TABLET ORAL DAILY
COMMUNITY

## 2020-09-08 RX ORDER — FUROSEMIDE 20 MG
20 TABLET ORAL DAILY
Qty: 30 TABLET | Refills: 1 | Status: SHIPPED | OUTPATIENT
Start: 2020-09-08 | End: 2020-12-14

## 2020-09-08 ASSESSMENT — PAIN SCALES - GENERAL: PAINLEVEL: NO PAIN (0)

## 2020-09-08 NOTE — PATIENT INSTRUCTIONS
-- Continue loratadine daily   -- Use Benadryl at night as needed   -- Keep amlodipine at 5 mg   -- Start furosemide for swelling in legs and high BP   -- Kidney panel 2 weeks   -- Dr Pro 2 weeks     -- Low salt diet, under 2000 mg/day   -- Fluid restrict, under 2000 mL/day aka 8.5 cups/day   -- Learn about DASH Diet (http://[employer]/DASHDiet - redirects to the [employer]) for dietary ways to reduce blood pressure   -- Elevate feet above your hips for 20-30 minutes, 4 times per day   -- Compression stockings from morning to night   -- Work on 5-10% weight loss    Check your Blood Pressure   -- Sit in a chair, feet flat on the floor for 5 minutes   -- Avoid caffeine, exercise and smoking for 30 minutes before checking   -- Have your arm at the level of your heart   -- Make sure you have the correct size cuff   -- Write them down, bring your log book in to your appointment     -- Goal blood pressure 120/70   -- Learn about DASH Diet ([email or web address] - redirects to the [employer]) for dietary ways to reduce blood pressure   -- Work on 5% weight loss   -- Daily physical exercise (eg. 30 min brisk walk)

## 2020-09-08 NOTE — PROGRESS NOTES
Subjective  Clemente Graham is a 55 year old male who presents for swollen eyes.  Lately he has been waking up in the morning and his eyes are quite swollen.  There is some mattering.  It improves and goes away throughout the day.  He does have a little bit of stuffiness.  Nothing different in the bedroom.  Never had a history of allergies.  No itching of eyes or ears.  No postnasal drainage.  Does not wear contacts.  No bowel changes.  He has had a slight swelling in his legs and feet.  Slight cough has been present.  No orthopnea.  He never increased the amlodipine from 5 to 10 mg of his previously discussed.  His blood pressure continues to be high.  He is not checking his blood sugars.  He ran out of Dexcom sensors.  He thinks they are probably a little higher.  A week ago Sunday he was baling hay and it was very mey.  The next day his symptoms worsened.    Problem List/PMH: reviewed in EMR, and made relevant updates today.  Medications: reviewed in EMR, and made relevant updates today.  Allergies: reviewed in EMR, and made relevant updates today.    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    Family Hx:   Family History   Problem Relation Age of Onset     Cancer Mother         ovarian     Prostate Cancer Father 65     Cancer Brother 50        had colon removed, ?colon cancer       Objective  Vitals: reviewed in EMR.  BP (!) 176/90 (BP Location: Right arm, Patient Position: Sitting, Cuff Size: Adult Large)   Pulse 86   Temp 96.8  F (36  C) (Tympanic)   Resp 16   Wt 128.6 kg (283 lb 9.6 oz)   SpO2 96%   BMI  33.63 kg/m      Gen: Pleasant male, NAD.  HEENT: MMM, no OP erythema.  Tympanic membranes are normal.  Nasal turbinates are pink.  Neck: Supple, no lymphadenopathy  CV: RRR no m/r/g.   Pulm: CTAB no w/r/r  Neuro: Grossly intact  Msk: No lower extremity edema.  Skin: No concerning lesions.  Psychiatric: Normal affect and insight. Does not appear anxious or depressed.    Labs:  Glucose   Date Value Ref Range Status   08/11/2020 148 (H) 70 - 105 mg/dL Final   05/21/2020 114 (H) 70 - 105 mg/dL Final     Hemoglobin A1C   Date Value Ref Range Status   05/21/2020 7.2 (H) 4.0 - 6.0 % Final     Creatinine   Date Value Ref Range Status   08/11/2020 1.19 0.70 - 1.30 mg/dL Final   05/21/2020 1.41 (H) 0.70 - 1.30 mg/dL Final     LDL Cholesterol Calculated   Date Value Ref Range Status   05/21/2020 62 <100 mg/dL Final     Comment:     Desirable:       <100 mg/dl             Assessment    ICD-10-CM    1. Acute conjunctivitis of both eyes, unspecified acute conjunctivitis type  H10.33    2. Essential hypertension  I10 amLODIPine (NORVASC) 5 MG tablet     Basic metabolic panel   3. Venous stasis of both lower extremities  I87.8 furosemide (LASIX) 20 MG tablet     Miscellaneous DME Order     Orders Placed This Encounter   Procedures     Basic metabolic panel     Miscellaneous DME Order       Etiology uncertain.  Differential diagnosis includes allergic rhinosinusitis, viral conjunctivitis, others.    Plan   -- Expected clinical course discussed   -- Medications and their side effects discussed  Patient Instructions    -- Continue loratadine daily   -- Use Benadryl at night as needed   -- Keep amlodipine at 5 mg   -- Start furosemide for swelling in legs and high BP   -- Kidney panel 2 weeks   -- Dr Pro 2 weeks     -- Low salt diet, under 2000 mg/day   -- Fluid restrict, under 2000 mL/day aka 8.5 cups/day   -- Learn about DASH Diet (http://bit.[employer]Beam/DASHDiet - redirects to the [facility]) for dietary ways to reduce blood pressure   --  Elevate feet above your hips for 20-30 minutes, 4 times per day   -- Compression stockings from morning to night   -- Work on 5-10% weight loss    Check your Blood Pressure   -- Sit in a chair, feet flat on the floor for 5 minutes   -- Avoid caffeine, exercise and smoking for 30 minutes before checking   -- Have your arm at the level of your heart   -- Make sure you have the correct size cuff   -- Write them down, bring your log book in to your appointment     -- Goal blood pressure 120/70   -- Learn about DASH Diet (http://[employer]/DASHDiet - redirects to the [facility]) for dietary ways to reduce blood pressure   -- Work on 5% weight loss   -- Daily physical exercise (eg. 30 min brisk walk)           Return in about 2 weeks (around 9/22/2020) for medication management.    Tello Weldon MD, FAAP, FACP  Internal Medicine & Pediatrics

## 2020-09-08 NOTE — NURSING NOTE
"Chief Complaint   Patient presents with     Eye Swelling     bilateral      Leg Swelling     bilateral      Patient is here for bilateral eye swelling in the mornings. He states they get better throughout the day. He states he never has had allergies. Has been taking Claritin for 2-3 days without any improvement. He also states he feels like his legs are swelling also. He states \"they feel funny also.\"     Initial BP (!) 178/64 (BP Location: Right arm, Patient Position: Sitting, Cuff Size: Adult Large)   Pulse 86   Temp 96.8  F (36  C) (Tympanic)   Resp 16   Wt 128.6 kg (283 lb 9.6 oz)   SpO2 96%   BMI 33.63 kg/m   Estimated body mass index is 33.63 kg/m  as calculated from the following:    Height as of 5/26/20: 1.956 m (6' 5\").    Weight as of this encounter: 128.6 kg (283 lb 9.6 oz).  Medication Reconciliation: complete    Tamiko Burnham MA  "

## 2020-09-08 NOTE — PROGRESS NOTES
DME (Durable Medical Equipment) Orders and Documentation  Orders Placed This Encounter   Procedures     Miscellaneous DME Order      The patient was assessed and it was determined the patient is in need of the following listed DME Supplies/Equipment. Please complete supporting documentation below to demonstrate medical necessity.      DME All Other Item(s) Documentation    List reason for need and supporting documentation for medical necessity below for each DME item.     1.     ICD-10-CM    1. Acute conjunctivitis of both eyes, unspecified acute conjunctivitis type  H10.33    2. Essential hypertension  I10 amLODIPine (NORVASC) 5 MG tablet     Basic metabolic panel   3. Venous stasis of both lower extremities  I87.8 furosemide (LASIX) 20 MG tablet     Miscellaneous DME Order

## 2020-09-22 ENCOUNTER — OFFICE VISIT (OUTPATIENT)
Dept: PEDIATRICS | Facility: OTHER | Age: 55
End: 2020-09-22
Attending: INTERNAL MEDICINE
Payer: COMMERCIAL

## 2020-09-22 VITALS
WEIGHT: 266.3 LBS | DIASTOLIC BLOOD PRESSURE: 86 MMHG | TEMPERATURE: 97 F | SYSTOLIC BLOOD PRESSURE: 142 MMHG | BODY MASS INDEX: 31.58 KG/M2 | OXYGEN SATURATION: 97 % | HEART RATE: 86 BPM | RESPIRATION RATE: 16 BRPM

## 2020-09-22 DIAGNOSIS — H10.13 ALLERGIC CONJUNCTIVITIS, BILATERAL: ICD-10-CM

## 2020-09-22 DIAGNOSIS — Z23 NEED FOR PROPHYLACTIC VACCINATION AND INOCULATION AGAINST INFLUENZA: ICD-10-CM

## 2020-09-22 DIAGNOSIS — E10.59 TYPE 1 DIABETES MELLITUS WITH OTHER CIRCULATORY COMPLICATION (H): Chronic | ICD-10-CM

## 2020-09-22 DIAGNOSIS — I10 ESSENTIAL HYPERTENSION: ICD-10-CM

## 2020-09-22 DIAGNOSIS — I87.8 VENOUS STASIS OF BOTH LOWER EXTREMITIES: Primary | ICD-10-CM

## 2020-09-22 LAB
ALBUMIN SERPL-MCNC: 3 G/DL (ref 3.5–5.7)
ALBUMIN SERPL-MCNC: 3 G/DL (ref 3.5–5.7)
ALP SERPL-CCNC: 82 U/L (ref 34–104)
ALT SERPL W P-5'-P-CCNC: 30 U/L (ref 7–52)
ANION GAP SERPL CALCULATED.3IONS-SCNC: 8 MMOL/L (ref 3–14)
AST SERPL W P-5'-P-CCNC: 26 U/L (ref 13–39)
BILIRUB DIRECT SERPL-MCNC: 0.1 MG/DL (ref 0–0.2)
BILIRUB SERPL-MCNC: 0.6 MG/DL (ref 0.3–1)
BUN SERPL-MCNC: 16 MG/DL (ref 7–25)
CALCIUM SERPL-MCNC: 8.2 MG/DL (ref 8.6–10.3)
CHLORIDE SERPL-SCNC: 106 MMOL/L (ref 98–107)
CO2 SERPL-SCNC: 25 MMOL/L (ref 21–31)
CREAT SERPL-MCNC: 1.24 MG/DL (ref 0.7–1.3)
GFR SERPL CREATININE-BSD FRML MDRD: 61 ML/MIN/{1.73_M2}
GLUCOSE SERPL-MCNC: 242 MG/DL (ref 70–105)
NT-PROBNP SERPL-MCNC: 68 PG/ML (ref 0–100)
POTASSIUM SERPL-SCNC: 4.5 MMOL/L (ref 3.5–5.1)
PROT SERPL-MCNC: 5.7 G/DL (ref 6.4–8.9)
SODIUM SERPL-SCNC: 139 MMOL/L (ref 134–144)
TSH SERPL DL<=0.05 MIU/L-ACNC: 1.9 IU/ML (ref 0.34–5.6)

## 2020-09-22 PROCEDURE — 80048 BASIC METABOLIC PNL TOTAL CA: CPT | Mod: ZL | Performed by: INTERNAL MEDICINE

## 2020-09-22 PROCEDURE — 82040 ASSAY OF SERUM ALBUMIN: CPT | Mod: ZL | Performed by: INTERNAL MEDICINE

## 2020-09-22 PROCEDURE — 36415 COLL VENOUS BLD VENIPUNCTURE: CPT | Mod: ZL | Performed by: INTERNAL MEDICINE

## 2020-09-22 PROCEDURE — 90471 IMMUNIZATION ADMIN: CPT | Performed by: INTERNAL MEDICINE

## 2020-09-22 PROCEDURE — 90686 IIV4 VACC NO PRSV 0.5 ML IM: CPT | Performed by: INTERNAL MEDICINE

## 2020-09-22 PROCEDURE — 83880 ASSAY OF NATRIURETIC PEPTIDE: CPT | Mod: ZL | Performed by: INTERNAL MEDICINE

## 2020-09-22 PROCEDURE — 84443 ASSAY THYROID STIM HORMONE: CPT | Mod: ZL | Performed by: INTERNAL MEDICINE

## 2020-09-22 PROCEDURE — 80076 HEPATIC FUNCTION PANEL: CPT | Mod: ZL | Performed by: INTERNAL MEDICINE

## 2020-09-22 PROCEDURE — 99214 OFFICE O/P EST MOD 30 MIN: CPT | Mod: 25 | Performed by: INTERNAL MEDICINE

## 2020-09-22 ASSESSMENT — PAIN SCALES - GENERAL: PAINLEVEL: NO PAIN (0)

## 2020-09-22 NOTE — LETTER
September 22, 2020      Clemente Graham  [address] [address] [address]        Dear ,    We are writing to inform you of your test results.    Your albumin level is a little low, meaning you are not eating enough protein.  Work on eating more healthy protein such as eggs, lean beef, chicken, fish, etc.  Heart test totally normal. Thyroid normal. Liver normal.    Signed, Tello Pro MD, FAAP, FACP  Internal Medicine & Pediatrics      Resulted Orders   Hepatic Function Panel   Result Value Ref Range    Bilirubin Direct 0.1 0.0 - 0.2 mg/dL    Bilirubin Total 0.6 0.3 - 1.0 mg/dL    Albumin 3.0 (L) 3.5 - 5.7 g/dL    Protein Total 5.7 (L) 6.4 - 8.9 g/dL    Alkaline Phosphatase 82 34 - 104 U/L    ALT 30 7 - 52 U/L    AST 26 13 - 39 U/L   Thyrotropin GH   Result Value Ref Range    Thyrotropin 1.90 0.34 - 5.60 IU/mL   N terminal pro BNP outpatient   Result Value Ref Range    N-Terminal Pro Bnp 68 0 - 100 pg/mL      Comment:         Reference range shown and results flagged as abnormal are for the outpatient,   non acute settings. Establishing a baseline value for each individual patient   is useful for follow-up.  Suggested inpatient cut points for confirming diagnosis of CHF in an acute   setting are:   >450 pg/mL (age 18 to less than 50)   >900 pg/mL (age 50 to less than 75)   >1800 pg/mL (75 yrs and older)  An inpatient or emergency department NT-proPBNP <300 pg/mL effectively rules   out acute CHF, with 99% negative predictive value.          If you have any questions or concerns, please call the clinic at the number listed above.       Sincerely,        Tello Pro MD

## 2020-09-22 NOTE — PROGRESS NOTES
Subjective  Clemente Graham is a 55 year old male who presents for follow-up on several conditions.  Since our last visit he has been using Benadryl and Claritin.  He feels like his swollen eyes have improved significantly.  He continues to have lower extremity edema.  It is worse in the evening and better in the morning.  He is wearing the compression stockings as recommended.  He thinks he is eating pretty healthy.  He has been on amlodipine for so long that he does not really think that is related.  No orthopnea.  No shortness of breath.  No chest pains with exertion.  He does have swelling up into the thigh from time to time.  His son has lymphedema.  He personally has never had this problem.    Problem List/PMH: reviewed in EMR, and made relevant updates today.  Medications: reviewed in EMR, and made relevant updates today.  Allergies: reviewed in EMR, and made relevant updates today.    Social Hx:  Social History     Tobacco Use     Smoking status: Never Smoker     Smokeless tobacco: Never Used     Tobacco comment: no ecig   Substance Use Topics     Alcohol use: Yes     Frequency: Monthly or less     Drug use: Never     Social History     Social History Narrative    Works at [employer] as a [employer]    - wife Jeanette    1 son- Will     I reviewed social history and made relevant updates today.    Family Hx:   Family History   Problem Relation Age of Onset     Cancer Mother         ovarian     Prostate Cancer Father 65     Cancer Brother 50        had colon removed, ?colon cancer     Other - See Comments Son         lymphedema       Objective  Vitals: reviewed in EMR.  BP (!) 142/86 (BP Location: Right arm, Patient Position: Sitting, Cuff Size: Adult Large)   Pulse 86   Temp 97  F (36.1  C) (Tympanic)   Resp 16   Wt 120.8 kg (266 lb 4.8 oz)   SpO2 97%   BMI 31.58 kg/m      Gen: Pleasant male, NAD.  HEENT: MMM  Neck: Supple  Pulm: Breathing easily  Neuro: Grossly intact  Msk:  2+ pitting bilateral lower extremity edema at the distal thigh.  No posterior calf tenderness to palpation  Skin: No concerning lesions.  Psychiatric: Normal affect and insight. Does not appear anxious or depressed.    Results for orders placed or performed in visit on 09/22/20 (from the past 48 hour(s))   Hepatic Function Panel   Result Value Ref Range    Bilirubin Direct 0.1 0.0 - 0.2 mg/dL    Bilirubin Total 0.6 0.3 - 1.0 mg/dL    Albumin 3.0 (L) 3.5 - 5.7 g/dL    Protein Total 5.7 (L) 6.4 - 8.9 g/dL    Alkaline Phosphatase 82 34 - 104 U/L    ALT 30 7 - 52 U/L    AST 26 13 - 39 U/L   Thyrotropin GH   Result Value Ref Range    Thyrotropin 1.90 0.34 - 5.60 IU/mL   N terminal pro BNP outpatient   Result Value Ref Range    N-Terminal Pro Bnp 68 0 - 100 pg/mL         Assessment    ICD-10-CM    1. Venous stasis of both lower extremities  I87.8 Thyrotropin GH     Albumin     N terminal pro BNP outpatient     Hepatic Function Panel     Hepatic Function Panel     Thyrotropin GH     N terminal pro BNP outpatient     Albumin   2. Need for prophylactic vaccination and inoculation against influenza  Z23 INFLUENZA VACCINE IM > 6 MONTHS VALENT IIV4 [73673]   3. 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 Allergic conjunctivitis, bilateral  H10.13      Orders Placed This Encounter   Procedures     INFLUENZA VACCINE IM > 6 MONTHS VALENT IIV4 [47491]     Thyrotropin GH     Albumin     N terminal pro BNP outpatient     Hepatic Function Panel       Fortunately his electrolytes and kidney function have remained stable despite the addition of furosemide.  Blood pressure has improved as well.  He continues to have lower extremity edema which is due in part to venous stasis given the pattern of worse in the evening and better in the morning.  He has no symptoms that would be consistent with heart failure.  Differential diagnosis also includes protein malnutrition, hypothyroidism, adverse medication  reaction, others.  We discussed the possibility of reducing or discontinuing amlodipine and the patient declined at this time.  We would need to switch to an alternative agent in order to maintain his blood pressures.    After the visit his albumin level did return under normal.  I have encouraged him to improve his protein intake.  Close follow-up is recommended.    Plan   -- Expected clinical course discussed   -- Medications and their side effects discussed  Patient Instructions    -- Low salt diet, under 2000 mg/day   -- Fluid restrict, under 2000 mL/day aka 8.5 cups/day   -- Learn about DASH Diet (http://[employer]/DASHDiet - redirects to the [facility]) for dietary ways to reduce blood pressure   -- Elevate feet above your hips for 20-30 minutes, 4 times per day   -- Compression stockings from morning to night   -- Work on 5-10% weight loss   -- Schedule echocardiogram    Patient Education     Understanding Chronic Venous Insufficiency  Problems with the veins in the legs may lead to chronic venous insufficiency (CVI). CVI means that there is a long-term problem with the veins not being able to pump blood back to your heart. When this happens, blood stays in the legs and causes swelling and aching.   Two problems that may lead to chronic venous insufficiency are:    Damaged valves. Valves keep blood flowing from the legs through the blood vessels and back to the heart. When the valves are damaged, blood does not flow as well.     Deep vein thrombosis (DVT). Blood clots may form in the deep veins of the legs. This may cause pain, redness, and swelling in the legs. It may also block the flow of blood back to the heart. Seek medical care right away if you have these symptoms.    A blood clot in the leg can also break off and travel to the lungs. This is called pulmonary embolism (PE). In the lungs, the clot can cut off the flow of blood. This may cause chest pain, trouble breathing, sweating, a fast heartbeat, coughing (may  cough up blood), and fainting. It is a medical emergency and may cause death. Call 911 if you have these symptoms.    Healthcare providers call the two conditions, DVT and PE, venous thromboembolism (VTE).  CVI can t be cured, but you can control leg swelling to reduce the likelihood of ulcers (sores).  Recognizing the symptoms  Be aware of the following:    If you stand or sit with your feet down for long periods, your legs may ache or feel heavy.    Swollen ankles are possibly the most common symptom of CVI.    As swelling increases, the skin over your ankles may show red spots or a brownish tinge. The skin may feel leathery or scaly, and may start to itch.    If swelling is not controlled, an ulcer (open wound) may form.  What you can do  Reduce your risk of developing ulcers by doing the following:    Increase blood flow back to your heart by elevating your legs, exercising daily, and wearing elastic stockings.    Boost blood flow in your legs by losing excess weight.    If you must stand or sit in one place for a period of time, keep your blood moving by wiggling your toes, shifting your body position, and rising up on the balls of your feet.    Date Last Reviewed: 5/1/2016 2000-2019 [employer]. [address] [place] [address], [place] [id number]. All rights reserved. This information is not intended as a substitute for professional medical care. Always follow your healthcare professional's instructions.               No follow-ups on file.    Signed, Tello Pro MD, FAAP, FACP  Internal Medicine & Pediatrics

## 2020-09-22 NOTE — NURSING NOTE
"Chief Complaint   Patient presents with     Follow Up     Seen 9/8/2020     Eye Problem     Patient is here to follow up from his visit on 9/8/2020. He was seen for eye swelling and redness. Patient states it has gotten much better but eyes are still a little red.     Initial BP (!) 162/84 (BP Location: Right arm, Patient Position: Sitting, Cuff Size: Adult Large)   Pulse 86   Temp 97  F (36.1  C) (Tympanic)   Resp 16   Wt 120.8 kg (266 lb 4.8 oz)   SpO2 97%   BMI 31.58 kg/m   Estimated body mass index is 31.58 kg/m  as calculated from the following:    Height as of 5/26/20: 1.956 m (6' 5\").    Weight as of this encounter: 120.8 kg (266 lb 4.8 oz).  Medication Reconciliation: complete    Tamiko Burnham MA       Immunization Documentation    Prior to Immunization administration, verified patients identity using patient's name and date of birth. Please see IMMUNIZATIONS  and order for additional information.  Patient / Parent instructed to remain in clinic for 15 minutes and report any adverse reaction to staff immediately.    Was the entire amount of vaccines given used? Yes    Tamiko Burnham MA  9/22/2020   11:12 AM    "

## 2020-09-22 NOTE — PATIENT INSTRUCTIONS
-- Low salt diet, under 2000 mg/day   -- Fluid restrict, under 2000 mL/day aka 8.5 cups/day   -- Learn about DASH Diet ([email or web address]/DASHDiet - redirects to the [facility]) for dietary ways to reduce blood pressure   -- Elevate feet above your hips for 20-30 minutes, 4 times per day   -- Compression stockings from morning to night   -- Work on 5-10% weight loss   -- Schedule echocardiogram    Patient Education     Understanding Chronic Venous Insufficiency  Problems with the veins in the legs may lead to chronic venous insufficiency (CVI). CVI means that there is a long-term problem with the veins not being able to pump blood back to your heart. When this happens, blood stays in the legs and causes swelling and aching.   Two problems that may lead to chronic venous insufficiency are:    Damaged valves. Valves keep blood flowing from the legs through the blood vessels and back to the heart. When the valves are damaged, blood does not flow as well.     Deep vein thrombosis (DVT). Blood clots may form in the deep veins of the legs. This may cause pain, redness, and swelling in the legs. It may also block the flow of blood back to the heart. Seek medical care right away if you have these symptoms.    A blood clot in the leg can also break off and travel to the lungs. This is called pulmonary embolism (PE). In the lungs, the clot can cut off the flow of blood. This may cause chest pain, trouble breathing, sweating, a fast heartbeat, coughing (may cough up blood), and fainting. It is a medical emergency and may cause death. Call 911 if you have these symptoms.    Healthcare providers call the two conditions, DVT and PE, venous thromboembolism (VTE).  CVI can t be cured, but you can control leg swelling to reduce the likelihood of ulcers (sores).  Recognizing the symptoms  Be aware of the following:    If you stand or sit with your feet down for long periods, your legs may ache or feel heavy.    Swollen ankles are possibly the  most common symptom of CVI.    As swelling increases, the skin over your ankles may show red spots or a brownish tinge. The skin may feel leathery or scaly, and may start to itch.    If swelling is not controlled, an ulcer (open wound) may form.  What you can do  Reduce your risk of developing ulcers by doing the following:    Increase blood flow back to your heart by elevating your legs, exercising daily, and wearing elastic stockings.    Boost blood flow in your legs by losing excess weight.    If you must stand or sit in one place for a period of time, keep your blood moving by wiggling your toes, shifting your body position, and rising up on the balls of your feet.    Date Last Reviewed: 5/1/2016 2000-2019 [employer]. [address] [place] [address], [place] [address]. All rights reserved. This information is not intended as a substitute for professional medical care. Always follow your healthcare professional's instructions.

## 2020-10-20 ENCOUNTER — ALLIED HEALTH/NURSE VISIT (OUTPATIENT)
Dept: FAMILY MEDICINE | Facility: OTHER | Age: 55
End: 2020-10-20
Attending: INTERNAL MEDICINE
Payer: COMMERCIAL

## 2020-10-20 DIAGNOSIS — Z23 NEED FOR SHINGLES VACCINE: Primary | ICD-10-CM

## 2020-10-20 PROCEDURE — 90750 HZV VACC RECOMBINANT IM: CPT

## 2020-10-20 PROCEDURE — 90471 IMMUNIZATION ADMIN: CPT

## 2020-10-20 NOTE — PROGRESS NOTES
Immunization: Adult  Verified patient's name and . Stated reason for visit today is to receive Shingrix vaccine(s). Denied any concerns with previous immunizations. Allergies reviewed.  Screening Questionnaire Adult Immunization completed (see below). VIS handout(s) reviewed and given to take home. Shingrix prepared and administered IM per standing order. Administration documented in IMMUNIZATIONS (see MIIC and order for further information). Instructed to wait in lobby for 15 minutes post-injection and notify RN immediately of any adverse reaction.     Screening Questionnaire for Adult Immunization    Are you sick today?   No   Do you have allergies to vaccines or vaccine components?   No   Have you ever had a serious reaction after receiving a vaccination?   No   Have you received any vaccinations in the past 4 weeks?   No     Immunization questionnaire answers were all negative.    Osiel De La Cruz RN, BSN  ....................  10/20/2020   8:41 AM

## 2020-12-12 ENCOUNTER — ALLIED HEALTH/NURSE VISIT (OUTPATIENT)
Dept: FAMILY MEDICINE | Facility: OTHER | Age: 55
End: 2020-12-12
Attending: FAMILY MEDICINE
Payer: COMMERCIAL

## 2020-12-12 DIAGNOSIS — R50.9 FEVER AND CHILLS: Primary | ICD-10-CM

## 2020-12-12 PROCEDURE — U0003 INFECTIOUS AGENT DETECTION BY NUCLEIC ACID (DNA OR RNA); SEVERE ACUTE RESPIRATORY SYNDROME CORONAVIRUS 2 (SARS-COV-2) (CORONAVIRUS DISEASE [COVID-19]), AMPLIFIED PROBE TECHNIQUE, MAKING USE OF HIGH THROUGHPUT TECHNOLOGIES AS DESCRIBED BY CMS-2020-01-R: HCPCS | Mod: ZL | Performed by: FAMILY MEDICINE

## 2020-12-12 PROCEDURE — C9803 HOPD COVID-19 SPEC COLLECT: HCPCS

## 2020-12-12 NOTE — NURSING NOTE
Patient is here for covid testing for exposure and fever   Melissa Haque on 12/12/2020 at 11:42 AM

## 2020-12-13 LAB
SARS-COV-2 RNA SPEC QL NAA+PROBE: NOT DETECTED
SPECIMEN SOURCE: NORMAL

## 2020-12-14 ENCOUNTER — OFFICE VISIT (OUTPATIENT)
Dept: PEDIATRICS | Facility: OTHER | Age: 55
End: 2020-12-14
Attending: INTERNAL MEDICINE
Payer: COMMERCIAL

## 2020-12-14 VITALS
WEIGHT: 250 LBS | OXYGEN SATURATION: 97 % | SYSTOLIC BLOOD PRESSURE: 148 MMHG | TEMPERATURE: 97.5 F | BODY MASS INDEX: 29.65 KG/M2 | DIASTOLIC BLOOD PRESSURE: 66 MMHG | RESPIRATION RATE: 16 BRPM | HEART RATE: 94 BPM

## 2020-12-14 DIAGNOSIS — L02.619 CELLULITIS AND ABSCESS OF FOOT EXCLUDING TOE: Primary | ICD-10-CM

## 2020-12-14 DIAGNOSIS — I87.8 VENOUS STASIS OF BOTH LOWER EXTREMITIES: ICD-10-CM

## 2020-12-14 DIAGNOSIS — E11.42 DIABETIC PERIPHERAL NEUROPATHY (H): ICD-10-CM

## 2020-12-14 DIAGNOSIS — L03.119 CELLULITIS AND ABSCESS OF FOOT EXCLUDING TOE: Primary | ICD-10-CM

## 2020-12-14 DIAGNOSIS — E10.65 UNCONTROLLED TYPE 1 DIABETES MELLITUS WITH HYPERGLYCEMIA (H): ICD-10-CM

## 2020-12-14 DIAGNOSIS — L97.411 DIABETIC ULCER OF RIGHT HEEL ASSOCIATED WITH TYPE 1 DIABETES MELLITUS, LIMITED TO BREAKDOWN OF SKIN (H): ICD-10-CM

## 2020-12-14 DIAGNOSIS — E88.09 HYPOALBUMINEMIA: ICD-10-CM

## 2020-12-14 DIAGNOSIS — E10.621 DIABETIC ULCER OF RIGHT HEEL ASSOCIATED WITH TYPE 1 DIABETES MELLITUS, LIMITED TO BREAKDOWN OF SKIN (H): ICD-10-CM

## 2020-12-14 DIAGNOSIS — E10.59 TYPE 1 DIABETES MELLITUS WITH OTHER CIRCULATORY COMPLICATION (H): Chronic | ICD-10-CM

## 2020-12-14 LAB
ALBUMIN SERPL-MCNC: 3.3 G/DL (ref 3.5–5.7)
ALP SERPL-CCNC: 91 U/L (ref 34–104)
ALT SERPL W P-5'-P-CCNC: 28 U/L (ref 7–52)
ANION GAP SERPL CALCULATED.3IONS-SCNC: 9 MMOL/L (ref 3–14)
AST SERPL W P-5'-P-CCNC: 29 U/L (ref 13–39)
BILIRUB SERPL-MCNC: 0.4 MG/DL (ref 0.3–1)
BUN SERPL-MCNC: 35 MG/DL (ref 7–25)
CALCIUM SERPL-MCNC: 8.4 MG/DL (ref 8.6–10.3)
CHLORIDE SERPL-SCNC: 92 MMOL/L (ref 98–107)
CO2 SERPL-SCNC: 27 MMOL/L (ref 21–31)
CREAT SERPL-MCNC: 1.66 MG/DL (ref 0.7–1.3)
GFR SERPL CREATININE-BSD FRML MDRD: 43 ML/MIN/{1.73_M2}
GLUCOSE SERPL-MCNC: 783 MG/DL (ref 70–105)
HBA1C MFR BLD: 13.6 % (ref 4–6)
POTASSIUM SERPL-SCNC: 5 MMOL/L (ref 3.5–5.1)
PROT SERPL-MCNC: 6.1 G/DL (ref 6.4–8.9)
SODIUM SERPL-SCNC: 128 MMOL/L (ref 134–144)

## 2020-12-14 PROCEDURE — 99214 OFFICE O/P EST MOD 30 MIN: CPT | Performed by: INTERNAL MEDICINE

## 2020-12-14 PROCEDURE — 87070 CULTURE OTHR SPECIMN AEROBIC: CPT | Mod: ZL | Performed by: INTERNAL MEDICINE

## 2020-12-14 PROCEDURE — 83036 HEMOGLOBIN GLYCOSYLATED A1C: CPT | Mod: ZL | Performed by: INTERNAL MEDICINE

## 2020-12-14 PROCEDURE — 36415 COLL VENOUS BLD VENIPUNCTURE: CPT | Mod: ZL | Performed by: INTERNAL MEDICINE

## 2020-12-14 PROCEDURE — 80053 COMPREHEN METABOLIC PANEL: CPT | Mod: ZL | Performed by: INTERNAL MEDICINE

## 2020-12-14 RX ORDER — FUROSEMIDE 20 MG
20 TABLET ORAL DAILY
Qty: 90 TABLET | Refills: 3 | Status: SHIPPED | OUTPATIENT
Start: 2020-12-14 | End: 2021-02-18

## 2020-12-14 RX ORDER — SULFAMETHOXAZOLE/TRIMETHOPRIM 800-160 MG
1 TABLET ORAL 2 TIMES DAILY
Qty: 28 TABLET | Refills: 0 | Status: SHIPPED | OUTPATIENT
Start: 2020-12-14 | End: 2020-12-22

## 2020-12-14 ASSESSMENT — PAIN SCALES - GENERAL: PAINLEVEL: NO PAIN (0)

## 2020-12-14 NOTE — NURSING NOTE
"Chief Complaint   Patient presents with     Sore     Right heel      Patient is here for a sore on his right heel that he noticed either Friday or Saturday    Initial BP (!) 158/72 (BP Location: Right arm, Patient Position: Sitting, Cuff Size: Adult Large)   Pulse 94   Temp 97.5  F (36.4  C) (Tympanic)   Resp 16   Wt 113.4 kg (250 lb)   SpO2 97%   BMI 29.65 kg/m   Estimated body mass index is 29.65 kg/m  as calculated from the following:    Height as of 5/26/20: 1.956 m (6' 5\").    Weight as of this encounter: 113.4 kg (250 lb).  Medication Reconciliation: complete    Tamiko Burnham MA  "

## 2020-12-14 NOTE — PROGRESS NOTES
Subjective  Clemente Graham is a 55 year old male who presents for sore on foot.  He has a sore on the back of his right foot.  Its been there couple of days.  He is not exactly sure how it happened.  He always wears his work boots.  He has diabetic neuropathy.  He does not remember any specific injury.  No burn injury that he would be able to be aware of.  There was no fluid in the blister that he saw.  He is scheduled to see Chastity Marquez in the wound clinic in the morning.    Problem List/PMH: reviewed in EMR, and made relevant updates today.  Medications: reviewed in EMR, and made relevant updates today.  Allergies: reviewed in EMR, and made relevant updates today.    Social Hx:  Social History     Tobacco Use     Smoking status: Never Smoker     Smokeless tobacco: Never Used     Tobacco comment: no ecig   Substance Use Topics     Alcohol use: Yes     Frequency: Monthly or less     Drug use: Never     Social History     Social History Narrative    Works at [employer] as a [employer]    - wife Jeanette    1 son- Will     I reviewed social history and made relevant updates today.    Family Hx:   Family History   Problem Relation Age of Onset     Cancer Mother         ovarian     Prostate Cancer Father 65     Cancer Brother 50        had colon removed, ?colon cancer     Other - See Comments Son         lymphedema       Objective  Vitals: reviewed in EMR.  BP (!) 148/66 (BP Location: Right arm, Patient Position: Sitting, Cuff Size: Adult Large)   Pulse 94   Temp 97.5  F (36.4  C) (Tympanic)   Resp 16   Wt 113.4 kg (250 lb)   SpO2 97%   BMI 29.65 kg/m      Gen: Pleasant male, NAD.  HEENT: MMM  Neck: Supple  Pulm: Breathing easily  Neuro: Grossly intact  Skin: Erythema of the heel with desquamation as seen in the photo below.  Psychiatric: Normal affect and insight. Does not appear anxious or depressed.    Labs:  Glucose   Date Value Ref Range Status   09/22/2020 242 (H) 70 - 105  mg/dL Final   08/11/2020 148 (H) 70 - 105 mg/dL Final     Hemoglobin A1C   Date Value Ref Range Status   05/21/2020 7.2 (H) 4.0 - 6.0 % Final     Creatinine   Date Value Ref Range Status   09/22/2020 1.24 0.70 - 1.30 mg/dL Final   08/11/2020 1.19 0.70 - 1.30 mg/dL Final     LDL Cholesterol Calculated   Date Value Ref Range Status   05/21/2020 62 <100 mg/dL Final     Comment:     Desirable:       <100 mg/dl                   Assessment    ICD-10-CM    1. Cellulitis and abscess of foot excluding toe  L03.119 Wound Culture    L02.619 sulfamethoxazole-trimethoprim (BACTRIM DS) 800-160 MG tablet   2. Diabetic ulcer of right heel associated with type 1 diabetes mellitus, limited to breakdown of skin (H)  E10.621 Wound Culture    L97.411 sulfamethoxazole-trimethoprim (BACTRIM DS) 800-160 MG tablet     Comprehensive metabolic panel   3. Diabetic peripheral neuropathy (H)  E11.42 Wound Culture     sulfamethoxazole-trimethoprim (BACTRIM DS) 800-160 MG tablet   4. Hypoalbuminemia  E88.09 Comprehensive metabolic panel   5. Type 1 diabetes mellitus with other circulatory complication (H)  E10.59 Hemoglobin A1c   6. Venous stasis of both lower extremities  I87.8 furosemide (LASIX) 20 MG tablet     Orders Placed This Encounter   Procedures     Comprehensive metabolic panel     Hemoglobin A1c       I leave that this is a friction injury due to his work boots complicated by diabetic neuropathy.  There is also the possibility of an initial or secondary bacterial infection.  A wound culture swab was obtained today as well.  Antibiotics are initiated.  Close follow-up is recommended.  He already has some Silvadene cream at home and I encouraged him to continue using that.    Plan   -- Expected clinical course discussed   -- Medications and their side effects discussed  Patient Instructions    -- Silvadene   -- Bactrim x 14 days  -- Eat yogurt 1-2 times per day while on antibiotics (and for a few weeks after) to reduce the chances of  diarrhea   -- Agree with consult with Chastity as planned     -- Consider additional work -up on low protein if persists   -- Eat healthy protein, eg fish, chicken, beef, eggs      Return if symptoms worsen or fail to improve.    Signed, Tello Pro MD, FAAP, FACP  [facility] Pediatric[facility]

## 2020-12-14 NOTE — PATIENT INSTRUCTIONS
-- Silvadene   -- Bactrim x 14 days  -- Eat yogurt 1-2 times per day while on antibiotics (and for a few weeks after) to reduce the chances of diarrhea   -- Agree with consult with Chastity as planned     -- Consider additional work -up on low protein if persists   -- Eat healthy protein, eg fish, chicken, beef, eggs

## 2020-12-15 ENCOUNTER — OFFICE VISIT (OUTPATIENT)
Dept: INTERNAL MEDICINE | Facility: OTHER | Age: 55
End: 2020-12-15
Attending: NURSE PRACTITIONER
Payer: COMMERCIAL

## 2020-12-15 VITALS
BODY MASS INDEX: 29.65 KG/M2 | RESPIRATION RATE: 16 BRPM | SYSTOLIC BLOOD PRESSURE: 128 MMHG | DIASTOLIC BLOOD PRESSURE: 70 MMHG | HEART RATE: 88 BPM | WEIGHT: 250 LBS | TEMPERATURE: 97.5 F

## 2020-12-15 DIAGNOSIS — L97.412 DIABETIC ULCER OF RIGHT HEEL ASSOCIATED WITH TYPE 1 DIABETES MELLITUS, WITH FAT LAYER EXPOSED (H): Primary | ICD-10-CM

## 2020-12-15 DIAGNOSIS — E10.621 DIABETIC ULCER OF RIGHT HEEL ASSOCIATED WITH TYPE 1 DIABETES MELLITUS, WITH FAT LAYER EXPOSED (H): Primary | ICD-10-CM

## 2020-12-15 DIAGNOSIS — E11.42 DIABETIC PERIPHERAL NEUROPATHY (H): ICD-10-CM

## 2020-12-15 DIAGNOSIS — R73.9 HYPERGLYCEMIA: ICD-10-CM

## 2020-12-15 PROCEDURE — 99213 OFFICE O/P EST LOW 20 MIN: CPT | Performed by: NURSE PRACTITIONER

## 2020-12-15 ASSESSMENT — ENCOUNTER SYMPTOMS
DIAPHORESIS: 0
WEAKNESS: 0
CONFUSION: 0
POLYDIPSIA: 0
WOUND: 1
FEVER: 0
POLYPHAGIA: 0

## 2020-12-15 ASSESSMENT — PAIN SCALES - GENERAL: PAINLEVEL: NO PAIN (0)

## 2020-12-15 NOTE — PROGRESS NOTES
Subjective:  He is here today for wound evaluation as requested by Dr. Pro.  Last Saturday patient noticed a sore on his heel.  He believes that his work boots may have been rubbing.  He is never seem to have much for problem with them before in the past.  He has had other diabetic ulcers that this provider is familiar with.  He has been referred for orthotics in the past but he reports that he never was seen by the orthotist, he forgot to make an appointment.  His wife has been doing dressing change.  He was seen by Dr. Pro in clinic yesterday and prescribed Silvadene cream and dry gauze.  This was changed last night.  He was also started on Bactrim DS for an early cellulitis and wound culture is pending.  Patient states that he has not started the Bactrim DS as he needs to go to pharmacy and pick it up today and he will get started on it this morning.  He was found to have a blood glucose level of 783 and A1c of over 13%.  Patient had not been using his Dexcom and was not using insulin regularly as prescribed.  He did receive some testing supplies and blood sugar this morning was 251.  He is using insulin as prescribed now.  He is planning to make a follow-up appointment with Dr. Pro for Thursday.    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)     Benign essential hypertension     Urinary retention     Insulin pump in place     Mixed hyperlipidemia     Type 1 diabetes mellitus with stage 3 chronic kidney disease (H)     Chronic kidney disease, stage III (moderate)     Diabetic peripheral neuropathy (H)     Lower limb amputation, great toe, right (H)     Hx diabetic foot infection     Allergic conjunctivitis, bilateral     Past Medical History:   Diagnosis Date     Carpal tunnel syndrome, bilateral      Diabetic foot ulcer (H)      Hyperlipidemia      Hypertension      Microalbuminuria      Neuropathy      Retinopathy      Trigger finger of both  hands      Type 1 diabetes (H) 1982     Ulnar nerve entrapment at elbow, left      Urinary retention      Past Surgical History:   Procedure Laterality Date     AMPUTATION Right     Great toe     CARPAL TUNNEL RELEASE RT/LT Bilateral      CLOSED REDUCTION, PERCUTANEOUS PINNING HIP      age 14, pinned for dislocation     COLONOSCOPY      Due 2022. has had two, history of polyps. recommend 5 year follow-up     ROTATOR CUFF REPAIR RT/LT Bilateral      Social History     Socioeconomic History     Marital status:      Spouse name: Not on file     Number of children: Not on file     Years of education: Not on file     Highest education level: Not on file   Occupational History     Not on file   Social Needs     Financial resource strain: Not on file     Food insecurity     Worry: Not on file     Inability: Not on file     Transportation needs     Medical: Not on file     Non-medical: Not on file   Tobacco Use     Smoking status: Never Smoker     Smokeless tobacco: Never Used     Tobacco comment: no ecig   Substance and Sexual Activity     Alcohol use: Yes     Frequency: Monthly or less     Comment: little     Drug use: Never     Sexual activity: Not Currently   Lifestyle     Physical activity     Days per week: Not on file     Minutes per session: Not on file     Stress: Not on file   Relationships     Social connections     Talks on phone: Not on file     Gets together: Not on file     Attends Orthodox service: Not on file     Active member of club or organization: Not on file     Attends meetings of clubs or organizations: Not on file     Relationship status: Not on file     Intimate partner violence     Fear of current or ex partner: Not on file     Emotionally abused: Not on file     Physically abused: Not on file     Forced sexual activity: Not on file   Other Topics Concern     Not on file   Social History Narrative    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     - wife Jeanette Rubi  son- Will     Family History   Problem Relation Age of Onset     Cancer Mother         ovarian     Prostate Cancer Father 65     Cancer Brother 50        had colon removed, ?colon cancer     Other - See Comments Son         lymphedema     Current Outpatient Medications   Medication Sig Dispense Refill     amLODIPine (NORVASC) 5 MG tablet Take 1 tablet (5 mg) by mouth daily 90 tablet 3     aspirin (ASA) 81 MG tablet Take 1 tablet by mouth daily       atorvastatin (LIPITOR) 40 MG tablet Take 1 tablet (40 mg) by mouth daily 90 tablet 3     blood glucose (NO BRAND SPECIFIED) lancets standard Dispense item covered by insurance. Test blood sugar 6 times daily. 100 each 11     blood glucose (NO BRAND SPECIFIED) test strip Dispense item covered by insurance. Test blood sugar 6 times daily. 100 strip 11     blood glucose monitoring (NO BRAND SPECIFIED) meter device kit Dispense option covered by insurance. Test blood sugar 6 times daily. 1 kit 11     Catheters [facility] 3DSoC Magic3 14F ref #[id number] - use to self-catheterize 4 times daily.       furosemide (LASIX) 20 MG tablet Take 1 tablet (20 mg) by mouth daily 90 tablet 3     glucagon 1 MG kit Inject 1 mg into the muscle as needed       insulin aspart (NOVOLOG VIAL) 100 UNITS/ML vial Use as directed. Max daily dose 70 units (pump) 5 vial 0     lisinopril (ZESTRIL) 40 MG tablet Take 1 tablet (40 mg) by mouth daily 90 tablet 3     loratadine (CLARITIN) 10 MG tablet Take 10 mg by mouth daily       Multiple Vitamin (MULTI VITAMIN W/D-3) TABS Take 1 tablet by mouth daily       order for DME Automatic electronic sphygmomanometer including machine, cuff, tubing and all associated supplies. Essential Hypertension, chronic. Lifelong. 1 each 11     tamsulosin (FLOMAX) 0.4 MG capsule Take 1 capsule by mouth daily  3     Continuous Blood Gluc  (DEXCOM G6 ) RONALD        Continuous Blood Gluc Sensor (DEXCOM G6 SENSOR) MISC every 30 days       Continuous Blood Gluc Transmit  (DEXCOM G6 TRANSMITTER) MISC        sulfamethoxazole-trimethoprim (BACTRIM DS) 800-160 MG tablet Take 1 tablet by mouth 2 times daily for 14 days (Patient not taking: Reported on 12/15/2020) 28 tablet 0     Patient has no known allergies.      Review of Systems:  Review of Systems   Constitutional: Negative for diaphoresis and fever.   Endocrine: Negative for polydipsia, polyphagia and polyuria.   Skin: Positive for wound.   Neurological: Negative for weakness.   Psychiatric/Behavioral: Negative for confusion.       Objective:   /70 (BP Location: Right arm, Patient Position: Sitting, Cuff Size: Adult Large)   Pulse 88   Temp 97.5  F (36.4  C) (Tympanic)   Resp 16   Wt 113.4 kg (250 lb)   BMI 29.65 kg/m    Physical Exam  Pleasant gentleman in no acute distress.  Affect normal.  Alert and oriented x4.  Right lower extremity has a diabetic ulcer that measures 4 x 6 cm with pink wound bed and scant mixed light yellow slough.  Mild erythema surrounding but no warmth.  When compared to photo from yesterday unchanged.  Right foot DP PT 2+.  Right foot positive for peripheral neuropathy.  Wound is irrigated with saline wash and a 4 inch Allevyn gentle border light dressing applied.  Additional Medipore tape used to secure dressing as it is at a location of high friction.    Assessment:    ICD-10-CM    1. Diabetic ulcer of right heel associated with type 1 diabetes mellitus, with fat layer exposed (H)  E10.621 ORTHOTICS REFERRAL    L97.412    2. Diabetic peripheral neuropathy (H)  E11.42    3. Hyperglycemia  R73.9        Plan:   Patient will get started on the Bactrim today.  Appears to have mild early cellulitis.  Wound culture pending.  Recommend dressing change every 2 days by cleansing with soap and water, rinse clear, gently dry then apply the Allevyn dressing and secure with Medipore tape.  Recommend that he avoid his work boots or other shoes that could potentially cause friction.  I have given him another  referral to meet with orthotist and stressed the importance of custom fitted diabetic foot wear including shoes and orthotics.  Orthotist can also work with him on his work boots to make sure they are fitting properly and offloading. Supplies provided to patient today for wound care.  Blood sugar is better today at 251.  He is now checking his sugars again and treating with prescribed insulin.  He is making a follow-up appointment with Dr. Pro for Thursday.  He will be seen back in wound clinic in 1 week, sooner with concerns.  In the meantime monitor for worsening of wound or worsening of cellulitis as reviewed and discussed.  Also if he develops progressive hyperglycemia that is not controlled with his current measures and needs to be evaluated urgently especially he becomes symptomatic.      HOWIE Humphreys   12/15/2020  8:41 AM

## 2020-12-15 NOTE — NURSING NOTE
"Chief Complaint   Patient presents with     WOUND CARE       Initial /70 (BP Location: Right arm, Patient Position: Sitting, Cuff Size: Adult Large)   Pulse 88   Temp 97.5  F (36.4  C) (Tympanic)   Resp 16   Wt 113.4 kg (250 lb)   BMI 29.65 kg/m   Estimated body mass index is 29.65 kg/m  as calculated from the following:    Height as of 5/26/20: 1.956 m (6' 5\").    Weight as of this encounter: 113.4 kg (250 lb).  Medication Reconciliation: complete    Jennifer Stark LPN  "

## 2020-12-16 LAB
BACTERIA SPEC CULT: NORMAL
SPECIMEN SOURCE: NORMAL

## 2020-12-17 ENCOUNTER — TELEPHONE (OUTPATIENT)
Dept: EDUCATION SERVICES | Facility: OTHER | Age: 55
End: 2020-12-17

## 2020-12-17 ENCOUNTER — OFFICE VISIT (OUTPATIENT)
Dept: PEDIATRICS | Facility: OTHER | Age: 55
End: 2020-12-17
Attending: INTERNAL MEDICINE
Payer: COMMERCIAL

## 2020-12-17 VITALS
BODY MASS INDEX: 29.65 KG/M2 | SYSTOLIC BLOOD PRESSURE: 138 MMHG | HEART RATE: 87 BPM | RESPIRATION RATE: 16 BRPM | OXYGEN SATURATION: 96 % | DIASTOLIC BLOOD PRESSURE: 78 MMHG | WEIGHT: 250 LBS | TEMPERATURE: 96.9 F

## 2020-12-17 DIAGNOSIS — E10.65 UNCONTROLLED TYPE 1 DIABETES MELLITUS WITH HYPERGLYCEMIA (H): Primary | ICD-10-CM

## 2020-12-17 DIAGNOSIS — N17.9 ACUTE KIDNEY INJURY (H): ICD-10-CM

## 2020-12-17 DIAGNOSIS — E10.621 DIABETIC ULCER OF RIGHT HEEL ASSOCIATED WITH TYPE 1 DIABETES MELLITUS, LIMITED TO BREAKDOWN OF SKIN (H): ICD-10-CM

## 2020-12-17 DIAGNOSIS — L97.411 DIABETIC ULCER OF RIGHT HEEL ASSOCIATED WITH TYPE 1 DIABETES MELLITUS, LIMITED TO BREAKDOWN OF SKIN (H): ICD-10-CM

## 2020-12-17 LAB
ANION GAP SERPL CALCULATED.3IONS-SCNC: 8 MMOL/L (ref 3–14)
BUN SERPL-MCNC: 29 MG/DL (ref 7–25)
CALCIUM SERPL-MCNC: 8.9 MG/DL (ref 8.6–10.3)
CHLORIDE SERPL-SCNC: 100 MMOL/L (ref 98–107)
CO2 SERPL-SCNC: 27 MMOL/L (ref 21–31)
CREAT SERPL-MCNC: 1.85 MG/DL (ref 0.7–1.3)
GFR SERPL CREATININE-BSD FRML MDRD: 38 ML/MIN/{1.73_M2}
GLUCOSE SERPL-MCNC: 233 MG/DL (ref 70–105)
POTASSIUM SERPL-SCNC: 5.1 MMOL/L (ref 3.5–5.1)
SODIUM SERPL-SCNC: 135 MMOL/L (ref 134–144)

## 2020-12-17 PROCEDURE — 80048 BASIC METABOLIC PNL TOTAL CA: CPT | Mod: ZL | Performed by: INTERNAL MEDICINE

## 2020-12-17 PROCEDURE — 36415 COLL VENOUS BLD VENIPUNCTURE: CPT | Mod: ZL | Performed by: INTERNAL MEDICINE

## 2020-12-17 PROCEDURE — 99213 OFFICE O/P EST LOW 20 MIN: CPT | Performed by: INTERNAL MEDICINE

## 2020-12-17 ASSESSMENT — PAIN SCALES - GENERAL: PAINLEVEL: NO PAIN (0)

## 2020-12-17 NOTE — NURSING NOTE
"Chief Complaint   Patient presents with     Follow Up     Sore     Diabetes     Patient is here for a follow up on sore on right heel and diabetes     Initial BP (!) 144/82 (BP Location: Right arm, Patient Position: Sitting, Cuff Size: Adult Large)   Pulse 87   Temp 96.9  F (36.1  C) (Tympanic)   Resp 16   Wt 113.4 kg (250 lb)   SpO2 96%   BMI 29.65 kg/m   Estimated body mass index is 29.65 kg/m  as calculated from the following:    Height as of 5/26/20: 1.956 m (6' 5\").    Weight as of this encounter: 113.4 kg (250 lb).  Medication Reconciliation: complete    Tamiko Burnham MA  "

## 2020-12-17 NOTE — PROGRESS NOTES
Subjective  Clemente Graham is a 55 year old male who presents for follow-up.  Our last visit was on Tuesday for a wound on his foot.  He has also seen Chastity Marquez since then.  Quite surprisingly his glucose and A1c were very significantly worsened associated with other significant laboratory abnormalities.  His CGM had stopped working.  He admits he was not paying as close of attention to his diabetes.  He has a diet that he tends to snack throughout the day.  He was often not taking his insulin with meals.  He has had significant elevations of A1c in the past, and has had a nonhealing infection that led to loss of the great toe on the left foot.  He plans to work on better control.  Since our visit his sugars were in the 400s the day after and since then 147-236.  He really would like to get the CGM working again.  No abdominal pain or vomiting.    Problem List/PMH: reviewed in EMR, and made relevant updates today.  Medications: reviewed in EMR, and made relevant updates today.  Allergies: reviewed in EMR, and made relevant updates today.    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    Family Hx:   Family History   Problem Relation Age of Onset     Cancer Mother         ovarian     Prostate Cancer Father 65     Cancer Brother 50        had colon removed, ?colon cancer     Other - See Comments Son         lymphedema       Objective  Vitals: reviewed in EMR.  /78 (BP Location: Right arm, Patient Position: Sitting, Cuff Size: Adult Large)   Pulse 87   Temp 96.9  F (36.1  C) (Tympanic)    Resp 16   Wt 113.4 kg (250 lb)   SpO2 96%   BMI 29.65 kg/m      Gen: Pleasant male, NAD.  HEENT: MMM  Neck: Supple  Pulm: Breathing easily  Neuro: Grossly intact  Skin: No concerning lesions.  Psychiatric: Normal affect and insight. Does not appear anxious or depressed.    Labs:  Lab Results   Component Value Date    CHOL 118 05/21/2020    TRIG 66 05/21/2020    HDL 43 05/21/2020    ALT 28 12/14/2020    AST 29 12/14/2020     12/17/2020    BUN 29 (H) 12/17/2020    CO2 27 12/17/2020       Glucose   Date Value Ref Range Status   12/17/2020 233 (H) 70 - 105 mg/dL Final   12/14/2020 783 (HH) 70 - 105 mg/dL Final     Comment:     Critical Value called to and read back by  DEAN DOYLE AT 1658 ON 12.14.2020, CRB       Hemoglobin A1C   Date Value Ref Range Status   12/14/2020 13.6 (H) 4.0 - 6.0 % Final   05/21/2020 7.2 (H) 4.0 - 6.0 % Final     Creatinine   Date Value Ref Range Status   12/17/2020 1.85 (H) 0.70 - 1.30 mg/dL Final   12/14/2020 1.66 (H) 0.70 - 1.30 mg/dL Final     LDL Cholesterol Calculated   Date Value Ref Range Status   05/21/2020 62 <100 mg/dL Final     Comment:     Desirable:       <100 mg/dl     Thyrotropin   Date Value Ref Range Status   09/22/2020 1.90 0.34 - 5.60 IU/mL Final                   Assessment    ICD-10-CM    1. Uncontrolled type 1 diabetes mellitus with hyperglycemia (H)  E10.65 Basic metabolic panel     Basic metabolic panel     Basic metabolic panel   2. Diabetic ulcer of right heel associated with type 1 diabetes mellitus, limited to breakdown of skin (H)  E10.621 Miscellaneous DME Order    L97.411 Basic metabolic panel     Basic metabolic panel     Basic metabolic panel   3. Acute kidney injury (H)  N17.9 Basic metabolic panel     Orders Placed This Encounter   Procedures     Basic metabolic panel     Basic metabolic panel     Miscellaneous DME Order       We discussed the need to manage diabetes today.  His potassium has remained stable.  His calcium level has improved.  I have  encouraged him to increase his fluid intake.  We will monitor his kidney panel closely.  He never developed symptoms of DKA.    Plan   -- Expected clinical course discussed   -- Medications and their side effects discussed  Patient Instructions    -- Repeat kidney panel today   -- Follow-up with Chastity   -- Set follow-up with [facility] for CGM      Return in about 3 months (around 3/17/2021) for med management.    Signed, Tello Pro MD, FAAP, FACP  [facility]

## 2020-12-17 NOTE — PROGRESS NOTES
DME (Durable Medical Equipment) Orders and Documentation  Orders Placed This Encounter   Procedures     Miscellaneous DME Order      The patient was assessed and it was determined the patient is in need of the following listed DME Supplies/Equipment. Please complete supporting documentation below to demonstrate medical necessity.      DME All Other Item(s) Documentation    List reason for need and supporting documentation for medical necessity below for each DME item.     1.     ICD-10-CM    1. Uncontrolled type 1 diabetes mellitus with hyperglycemia (H)  E10.65 Basic metabolic panel     Basic metabolic panel     Basic metabolic panel   2. Diabetic ulcer of right heel associated with type 1 diabetes mellitus, limited to breakdown of skin (H)  E10.621 Miscellaneous DME Order    L97.411 Basic metabolic panel     Basic metabolic panel     Basic metabolic panel   3. Acute kidney injury (H)  N17.9 Basic metabolic panel

## 2020-12-17 NOTE — TELEPHONE ENCOUNTER
"Patient states he has not received Dexcom supplies in months.  Patient does not know who his Dexcom supply distributor is.  Unable to locate it in patient chart.    Phoned [employer] to inquire where patient is supposed to receive his supplies.  [employer] unable to tell me, but rep states, \"We can tell the patient who the distributor is.\"    Phoned patient, [employer] phone number given.  Request patient to call [employer] and Distributor for supply reorder.    Patient to call [facility] if any further issues getting his Dexcom supply reordered.    Also encouraged patient to set up [facility] pump management visit soon.    Bindu Spring RN, BSN, [facility]  12/17/2020 4:48 PM   "

## 2020-12-22 ENCOUNTER — OFFICE VISIT (OUTPATIENT)
Dept: INTERNAL MEDICINE | Facility: OTHER | Age: 55
End: 2020-12-22
Attending: NURSE PRACTITIONER
Payer: COMMERCIAL

## 2020-12-22 ENCOUNTER — TRANSFERRED RECORDS (OUTPATIENT)
Dept: HEALTH INFORMATION MANAGEMENT | Facility: OTHER | Age: 55
End: 2020-12-22

## 2020-12-22 VITALS
RESPIRATION RATE: 16 BRPM | OXYGEN SATURATION: 97 % | HEART RATE: 86 BPM | DIASTOLIC BLOOD PRESSURE: 60 MMHG | BODY MASS INDEX: 29.5 KG/M2 | WEIGHT: 248.8 LBS | TEMPERATURE: 97.4 F | SYSTOLIC BLOOD PRESSURE: 134 MMHG

## 2020-12-22 DIAGNOSIS — L97.412 DIABETIC ULCER OF RIGHT HEEL ASSOCIATED WITH TYPE 1 DIABETES MELLITUS, WITH FAT LAYER EXPOSED (H): Primary | ICD-10-CM

## 2020-12-22 DIAGNOSIS — E10.621 DIABETIC ULCER OF RIGHT HEEL ASSOCIATED WITH TYPE 1 DIABETES MELLITUS, WITH FAT LAYER EXPOSED (H): Primary | ICD-10-CM

## 2020-12-22 DIAGNOSIS — E10.8 TYPE 1 DIABETES MELLITUS WITH UNSPECIFIED COMPLICATIONS (H): ICD-10-CM

## 2020-12-22 PROCEDURE — 99213 OFFICE O/P EST LOW 20 MIN: CPT | Performed by: NURSE PRACTITIONER

## 2020-12-22 ASSESSMENT — PAIN SCALES - GENERAL: PAINLEVEL: NO PAIN (0)

## 2020-12-22 NOTE — PROGRESS NOTES
Subjective:  He is here today to follow-up on diabetic ulcer of the right heel.  He has an appointment with orthotist this morning.  He continues to wear his work boots.  His wife does his dressing change.  He believes it is getting better.  He has noticed less drainage on the bandages.  He denies pain but does have peripheral neuropathy.  His wife is doing dressing change every other day.  They are not leaving wound open to the air.    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)     Benign essential hypertension     Urinary retention     Insulin pump in place     Mixed hyperlipidemia     Type 1 diabetes mellitus with stage 3 chronic kidney disease (H)     Chronic kidney disease, stage III (moderate)     Diabetic peripheral neuropathy (H)     Lower limb amputation, great toe, right (H)     Hx diabetic foot infection     Allergic conjunctivitis, bilateral     Past Medical History:   Diagnosis Date     Carpal tunnel syndrome, bilateral      Diabetic foot ulcer (H)      Hyperlipidemia      Hypertension      Microalbuminuria      Neuropathy      Retinopathy      Trigger finger of both hands      Type 1 diabetes (H) 1982     Ulnar nerve entrapment at elbow, left      Urinary retention      Past Surgical History:   Procedure Laterality Date     AMPUTATION Right     Great toe     CARPAL TUNNEL RELEASE RT/LT Bilateral      CLOSED REDUCTION, PERCUTANEOUS PINNING HIP      age 14, pinned for dislocation     COLONOSCOPY      Due 2022. has had two, history of polyps. recommend 5 year follow-up     ROTATOR CUFF REPAIR RT/LT Bilateral      Social History     Socioeconomic History     Marital status:      Spouse name: Not on file     Number of children: Not on file     Years of education: Not on file     Highest education level: Not on file   Occupational History     Not on file   Social Needs     Financial resource strain: Not on file     Food insecurity     Worry:  Not on file     Inability: Not on file     Transportation needs     Medical: Not on file     Non-medical: Not on file   Tobacco Use     Smoking status: Never Smoker     Smokeless tobacco: Never Used     Tobacco comment: no ecig   Substance and Sexual Activity     Alcohol use: Yes     Frequency: Monthly or less     Comment: little     Drug use: Never     Sexual activity: Not Currently   Lifestyle     Physical activity     Days per week: Not on file     Minutes per session: Not on file     Stress: Not on file   Relationships     Social connections     Talks on phone: Not on file     Gets together: Not on file     Attends Amish service: Not on file     Active member of club or organization: Not on file     Attends meetings of clubs or organizations: Not on file     Relationship status: Not on file     Intimate partner violence     Fear of current or ex partner: Not on file     Emotionally abused: Not on file     Physically abused: Not on file     Forced sexual activity: Not on file   Other Topics Concern     Not on file   Social History Narrative    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    - wife Jeanette    1 son- Will     Family History   Problem Relation Age of Onset     Cancer Mother         ovarian     Prostate Cancer Father 65     Cancer Brother 50        had colon removed, ?colon cancer     Other - See Comments Son         lymphedema     Current Outpatient Medications   Medication Sig Dispense Refill     amLODIPine (NORVASC) 5 MG tablet Take 1 tablet (5 mg) by mouth daily 90 tablet 3     aspirin (ASA) 81 MG tablet Take 1 tablet by mouth daily       atorvastatin (LIPITOR) 40 MG tablet Take 1 tablet (40 mg) by mouth daily 90 tablet 3     blood glucose (NO BRAND SPECIFIED) lancets standard Dispense item covered by insurance. Test blood sugar 6 times daily. 100 each 11     blood glucose (NO BRAND SPECIFIED) test strip Dispense item covered by insurance. Test blood sugar 6 times daily.  100 strip 11     blood glucose monitoring (NO BRAND SPECIFIED) meter device kit Dispense option covered by insurance. Test blood sugar 6 times daily. 1 kit 11     Catheters [facility] Bard Magic3 14F ref #[id number] - use to self-catheterize 4 times daily.       Continuous Blood Gluc  (DEXCOM G6 ) RONALD        Continuous Blood Gluc Sensor (DEXCOM G6 SENSOR) MISC every 30 days       Continuous Blood Gluc Transmit (DEXCOM G6 TRANSMITTER) MISC        furosemide (LASIX) 20 MG tablet Take 1 tablet (20 mg) by mouth daily 90 tablet 3     glucagon 1 MG kit Inject 1 mg into the muscle as needed       insulin aspart (NOVOLOG VIAL) 100 UNITS/ML vial Use as directed. Max daily dose 70 units (pump) 5 vial 0     lisinopril (ZESTRIL) 40 MG tablet Take 1 tablet (40 mg) by mouth daily 90 tablet 3     loratadine (CLARITIN) 10 MG tablet Take 10 mg by mouth daily       Multiple Vitamin (MULTI VITAMIN W/D-3) TABS Take 1 tablet by mouth daily       order for DME Automatic electronic sphygmomanometer including machine, cuff, tubing and all associated supplies. Essential Hypertension, chronic. Lifelong. 1 each 11     sulfamethoxazole-trimethoprim (BACTRIM DS) 800-160 MG tablet Take 1 tablet by mouth 2 times daily for 14 days 28 tablet 0     tamsulosin (FLOMAX) 0.4 MG capsule Take 1 capsule by mouth daily  3     Patient has no known allergies.      Review of Systems:  Review of Systems  Denies erythema, warmth, redness, fever, chills and odorous and purulent drainage.    Objective:   /60 (BP Location: Right arm, Patient Position: Sitting, Cuff Size: Adult Large)   Pulse 86   Temp 97.4  F (36.3  C) (Tympanic)   Resp 16   Wt 112.9 kg (248 lb 12.8 oz)   SpO2 97%   BMI 29.50 kg/m    Physical Exam  Pleasant gentleman no acute distress.  Affect normal.  Alert and oriented x4.  Right heel has 2 mm of maceration surrounding.  The wound bed has pink granulation tissue 90% with 10% white slough in the upper left corner.  Wound  measures 4 x 5.3 x 0.1 cm.  No surrounding erythema or warmth.  Wound is irrigated with saline wash, calcium alginate applied to wound followed by 4 inch Allevyn gentle border light dressing and secured with Medipore tape.    Assessment:    ICD-10-CM    1. Diabetic ulcer of right heel associated with type 1 diabetes mellitus, with fat layer exposed (H)  E10.621     L97.412        Plan:   I have ordered wound supplies through [facility].  Hopefully he should receive these by Monday.  In the meantime I have given him dressings to last for the next week.  Recommend dressing change by cleansing wound with soap and water, rinsing clear then gently dry then apply the calcium alginate (gelling fiber) to fit the size of wound and then place 4 inch Allevyn gentle border light dressing over wound and may secure with Medipore tape.  Prevent pressure.  Keep appointment with orthotist.  Follow-up in wound clinic in 1 week, sooner with concerns.  He will also call [facility] tomorrow to make sure they have sent out his dressings.  Monitor closely for signs and symptoms of infection and to be seen with any concerns.    HOWIE Humphreys   12/22/2020  10:39 AM

## 2020-12-22 NOTE — PATIENT INSTRUCTIONS
Cleanse wound with soap and water, rinse then pat dry.  Cut the gelling fiber to fit size of wound and place on to the wound then cover with the Allevyn bordered foam.  Change 3 times weekly.  Keep appt with orthotist to evaluate your footwear today and see Chastity in 7- 10 days.  Call [facility] tomorrow to make sure your wound supplies are being shipped out.

## 2020-12-22 NOTE — NURSING NOTE
Chief Complaint   Patient presents with     Wound Check     right heel   Patient presents to the clinic today for a right heel wound recheck  Medication Reconciliation: completed   Randi Alonso LPN  12/22/2020 8:58 AM

## 2020-12-23 NOTE — TELEPHONE ENCOUNTER
[facility] sent Rx request for the following:   insulin aspart (NOVOLOG VIAL) 100 UNITS/ML vial   Sig:USE AS DIRECTED MAX DAILY DOSE 70 UNITS    Last Prescription Date:   08/11/2020  Last Fill Qty/Refills:         5 vial, R-0    Last Office Visit:              12/17/2020 (Morteza)   Future Office visit:           12/30/2020 (Alma)   Short Acting Insulin Protocol Passed - 12/22/2020 11:46 AM     Prescription approved per [facility] Refill Protocol.  Rahel Sanderson RN ....................  12/23/2020   11:01 AM

## 2020-12-30 ENCOUNTER — OFFICE VISIT (OUTPATIENT)
Dept: INTERNAL MEDICINE | Facility: OTHER | Age: 55
End: 2020-12-30
Attending: INTERNAL MEDICINE
Payer: COMMERCIAL

## 2020-12-30 VITALS
HEART RATE: 90 BPM | WEIGHT: 250 LBS | BODY MASS INDEX: 29.65 KG/M2 | OXYGEN SATURATION: 97 % | TEMPERATURE: 97.4 F | DIASTOLIC BLOOD PRESSURE: 76 MMHG | SYSTOLIC BLOOD PRESSURE: 136 MMHG | RESPIRATION RATE: 16 BRPM

## 2020-12-30 DIAGNOSIS — E11.42 DIABETIC PERIPHERAL NEUROPATHY (H): ICD-10-CM

## 2020-12-30 DIAGNOSIS — L97.412 DIABETIC ULCER OF RIGHT HEEL ASSOCIATED WITH TYPE 1 DIABETES MELLITUS, WITH FAT LAYER EXPOSED (H): Primary | ICD-10-CM

## 2020-12-30 DIAGNOSIS — E10.621 DIABETIC ULCER OF RIGHT HEEL ASSOCIATED WITH TYPE 1 DIABETES MELLITUS, WITH FAT LAYER EXPOSED (H): Primary | ICD-10-CM

## 2020-12-30 PROCEDURE — 99213 OFFICE O/P EST LOW 20 MIN: CPT | Performed by: NURSE PRACTITIONER

## 2020-12-30 ASSESSMENT — PAIN SCALES - GENERAL: PAINLEVEL: NO PAIN (0)

## 2020-12-30 NOTE — NURSING NOTE
"Chief Complaint   Patient presents with     WOUND CARE       Initial /76 (BP Location: Right arm, Patient Position: Sitting, Cuff Size: Adult Regular)   Pulse 90   Temp 97.4  F (36.3  C) (Tympanic)   Resp 16   Wt 113.4 kg (250 lb)   SpO2 97%   BMI 29.65 kg/m   Estimated body mass index is 29.65 kg/m  as calculated from the following:    Height as of 5/26/20: 1.956 m (6' 5\").    Weight as of this encounter: 113.4 kg (250 lb).  Medication Reconciliation: complete  Yanet Menjivar LPN  "

## 2020-12-30 NOTE — PROGRESS NOTES
Subjective:  He is here today to follow-up on diabetic ulcer of the right heel.  He was seen by orthotist last week who recommended orthotics.  He did not make any changes to his work boot.  He still has not received wound supplies.  He called the wound company the day after last visit and they reported they were backordered but should be arriving today.  He states that he switched back to just doing the Allevyn foam dressing daily rather than every other day with the calcium alginate as he noticed that it was more odorous drainage.  This concerned him.  It was not more red or warm surrounding and once he went back to changing it daily the odor resolved.    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)     Benign essential hypertension     Urinary retention     Insulin pump in place     Mixed hyperlipidemia     Type 1 diabetes mellitus with stage 3 chronic kidney disease (H)     Chronic kidney disease, stage III (moderate)     Diabetic peripheral neuropathy (H)     Lower limb amputation, great toe, right (H)     Hx diabetic foot infection     Allergic conjunctivitis, bilateral     Past Medical History:   Diagnosis Date     Carpal tunnel syndrome, bilateral      Diabetic foot ulcer (H)      Hyperlipidemia      Hypertension      Microalbuminuria      Neuropathy      Retinopathy      Trigger finger of both hands      Type 1 diabetes (H) 1982     Ulnar nerve entrapment at elbow, left      Urinary retention      Past Surgical History:   Procedure Laterality Date     AMPUTATION Right     Great toe     CARPAL TUNNEL RELEASE RT/LT Bilateral      CLOSED REDUCTION, PERCUTANEOUS PINNING HIP      age 14, pinned for dislocation     COLONOSCOPY      Due 2022. has had two, history of polyps. recommend 5 year follow-up     ROTATOR CUFF REPAIR RT/LT Bilateral      Social History     Socioeconomic History     Marital status:      Spouse name: Not on file     Number of  children: Not on file     Years of education: Not on file     Highest education level: Not on file   Occupational History     Not on file   Social Needs     Financial resource strain: Not on file     Food insecurity     Worry: Not on file     Inability: Not on file     Transportation needs     Medical: Not on file     Non-medical: Not on file   Tobacco Use     Smoking status: Never Smoker     Smokeless tobacco: Never Used     Tobacco comment: no ecig   Substance and Sexual Activity     Alcohol use: Yes     Frequency: Monthly or less     Comment: little     Drug use: Never     Sexual activity: Not Currently   Lifestyle     Physical activity     Days per week: Not on file     Minutes per session: Not on file     Stress: Not on file   Relationships     Social connections     Talks on phone: Not on file     Gets together: Not on file     Attends Restoration service: Not on file     Active member of club or organization: Not on file     Attends meetings of clubs or organizations: Not on file     Relationship status: Not on file     Intimate partner violence     Fear of current or ex partner: Not on file     Emotionally abused: Not on file     Physically abused: Not on file     Forced sexual activity: Not on file   Other Topics Concern     Not on file   Social History Narrative    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     - wife Jeanette    1 son- Will     Family History   Problem Relation Age of Onset     Cancer Mother         ovarian     Prostate Cancer Father 65     Cancer Brother 50        had colon removed, ?colon cancer     Other - See Comments Son         lymphedema     Current Outpatient Medications   Medication Sig Dispense Refill     amLODIPine (NORVASC) 5 MG tablet Take 1 tablet (5 mg) by mouth daily 90 tablet 3     aspirin (ASA) 81 MG tablet Take 1 tablet by mouth daily       atorvastatin (LIPITOR) 40 MG tablet Take 1 tablet (40 mg) by mouth daily 90 tablet 3     blood glucose (NO BRAND  SPECIFIED) lancets standard Dispense item covered by insurance. Test blood sugar 6 times daily. 100 each 11     blood glucose (NO BRAND SPECIFIED) test strip Dispense item covered by insurance. Test blood sugar 6 times daily. 100 strip 11     blood glucose monitoring (NO BRAND SPECIFIED) meter device kit Dispense option covered by insurance. Test blood sugar 6 times daily. 1 kit 11     Catheters [facility] Bard Magic3 14F ref #[id number] - use to self-catheterize 4 times daily.       Continuous Blood Gluc  (DEXCOM G6 ) RONALD        Continuous Blood Gluc Sensor (DEXCOM G6 SENSOR) MISC every 30 days       Continuous Blood Gluc Transmit (DEXCOM G6 TRANSMITTER) MISC        furosemide (LASIX) 20 MG tablet Take 1 tablet (20 mg) by mouth daily 90 tablet 3     glucagon 1 MG kit Inject 1 mg into the muscle as needed       insulin aspart (NOVOLOG VIAL) 100 UNITS/ML vial USE AS DIRECTED MAX DAILY DOSE 70 UNITS 5 vial 1     lisinopril (ZESTRIL) 40 MG tablet Take 1 tablet (40 mg) by mouth daily 90 tablet 3     loratadine (CLARITIN) 10 MG tablet Take 10 mg by mouth daily       Multiple Vitamin (MULTI VITAMIN W/D-3) TABS Take 1 tablet by mouth daily       order for DME Automatic electronic sphygmomanometer including machine, cuff, tubing and all associated supplies. Essential Hypertension, chronic. Lifelong. 1 each 11     tamsulosin (FLOMAX) 0.4 MG capsule Take 1 capsule by mouth daily  3     Patient has no known allergies.      Review of Systems:  Review of Systems  Denies erythema, warmth, redness, fever, chills and odorous and purulent drainage.    Objective:   /76 (BP Location: Right arm, Patient Position: Sitting, Cuff Size: Adult Regular)   Pulse 90   Temp 97.4  F (36.3  C) (Tympanic)   Resp 16   Wt 113.4 kg (250 lb)   SpO2 97%   BMI 29.65 kg/m    Physical Exam  Pleasant gentleman no acute distress.  Affect normal.  Alert and oriented x4.  Right heel has 2 mm of maceration surrounding.  The wound bed has  pink granulation tissue 90% with 10% white slough in the upper left corner.  Wound measures 3.5 x 4.6 x 0.1 cm.  No surrounding erythema or warmth.  Wound is irrigated with saline wash, calcium alginate applied to wound followed by 4 inch Allevyn gentle border light dressing and secured with Medipore tape.    Assessment:    ICD-10-CM    1. Diabetic ulcer of right heel associated with type 1 diabetes mellitus, with fat layer exposed (H)  E10.621     L97.412    2. Diabetic peripheral neuropathy (H)  E11.42        Plan:   Discussed with patient that the odor is normal as the drainage soaked on the bandage can be odorous with reduction in dressing changes.  For appropriate wound healing should change dressing as little as possible.  Also insurance likely would not cover for him to have a daily dressing change with a foam dressing.  Once he cleans the wound if it continues to have an odor then needs to be concerned especially if he has surrounding erythema or warmth or any other symptoms suggestive of infection.  Recommend dressing change every other day by cleansing with saline then apply calcium alginate followed by 4 inch Allevyn gentle border light and securing with Medipore tape.  Follow-up in wound clinic in 2 weeks, sooner with concerns.    HOWIE Humphreys   12/30/2020  9:18 AM

## 2021-01-13 ENCOUNTER — OFFICE VISIT (OUTPATIENT)
Dept: INTERNAL MEDICINE | Facility: OTHER | Age: 56
End: 2021-01-13
Attending: NURSE PRACTITIONER
Payer: COMMERCIAL

## 2021-01-13 ENCOUNTER — TELEPHONE (OUTPATIENT)
Dept: EDUCATION SERVICES | Facility: OTHER | Age: 56
End: 2021-01-13

## 2021-01-13 VITALS
SYSTOLIC BLOOD PRESSURE: 152 MMHG | WEIGHT: 254.4 LBS | BODY MASS INDEX: 30.17 KG/M2 | TEMPERATURE: 98.3 F | RESPIRATION RATE: 18 BRPM | OXYGEN SATURATION: 98 % | DIASTOLIC BLOOD PRESSURE: 70 MMHG | HEART RATE: 88 BPM

## 2021-01-13 DIAGNOSIS — E10.621 DIABETIC ULCER OF RIGHT HEEL ASSOCIATED WITH TYPE 1 DIABETES MELLITUS, WITH FAT LAYER EXPOSED (H): Primary | ICD-10-CM

## 2021-01-13 DIAGNOSIS — E11.42 DIABETIC PERIPHERAL NEUROPATHY (H): ICD-10-CM

## 2021-01-13 DIAGNOSIS — L97.412 DIABETIC ULCER OF RIGHT HEEL ASSOCIATED WITH TYPE 1 DIABETES MELLITUS, WITH FAT LAYER EXPOSED (H): Primary | ICD-10-CM

## 2021-01-13 PROCEDURE — 99213 OFFICE O/P EST LOW 20 MIN: CPT | Performed by: NURSE PRACTITIONER

## 2021-01-13 ASSESSMENT — PAIN SCALES - GENERAL: PAINLEVEL: NO PAIN (0)

## 2021-01-13 NOTE — TELEPHONE ENCOUNTER
Patient states he is working with [employer] for his Dexcom G6 CGM supplies.  He has not heard anything lately, has not received supplies yet.    [facility], unable to reach representative.  Will call tomorrow.    Bindu Spring RN, BSN, [facility]  1/13/2021 4:22 PM

## 2021-01-13 NOTE — NURSING NOTE
Patient is here for wound care for his right foot.     Medication Reconciliation: complete    Loretta Muniz LPN  1/13/2021 9:25 AM

## 2021-01-13 NOTE — PROGRESS NOTES
Subjective:  He is here today to follow-up on diabetic ulcer of the right heel.  His wife is doing dressing change 3 times a week.  She feels that the wound is improving.  They are finding less drainage.    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)     Benign essential hypertension     Urinary retention     Insulin pump in place     Mixed hyperlipidemia     Type 1 diabetes mellitus with stage 3 chronic kidney disease (H)     Chronic kidney disease, stage III (moderate)     Diabetic peripheral neuropathy (H)     Lower limb amputation, great toe, right (H)     Hx diabetic foot infection     Allergic conjunctivitis, bilateral     Past Medical History:   Diagnosis Date     Carpal tunnel syndrome, bilateral      Diabetic foot ulcer (H)      Hyperlipidemia      Hypertension      Microalbuminuria      Neuropathy      Retinopathy      Trigger finger of both hands      Type 1 diabetes (H) 1982     Ulnar nerve entrapment at elbow, left      Urinary retention      Past Surgical History:   Procedure Laterality Date     AMPUTATION Right     Great toe     CARPAL TUNNEL RELEASE RT/LT Bilateral      CLOSED REDUCTION, PERCUTANEOUS PINNING HIP      age 14, pinned for dislocation     COLONOSCOPY      Due 2022. has had two, history of polyps. recommend 5 year follow-up     ROTATOR CUFF REPAIR RT/LT Bilateral      Social History     Socioeconomic History     Marital status:      Spouse name: Not on file     Number of children: Not on file     Years of education: Not on file     Highest education level: Not on file   Occupational History     Not on file   Social Needs     Financial resource strain: Not on file     Food insecurity     Worry: Not on file     Inability: Not on file     Transportation needs     Medical: Not on file     Non-medical: Not on file   Tobacco Use     Smoking status: Never Smoker     Smokeless tobacco: Never Used     Tobacco comment: no ecig    Substance and Sexual Activity     Alcohol use: Yes     Frequency: Monthly or less     Comment: little     Drug use: Never     Sexual activity: Not Currently   Lifestyle     Physical activity     Days per week: Not on file     Minutes per session: Not on file     Stress: Not on file   Relationships     Social connections     Talks on phone: Not on file     Gets together: Not on file     Attends Advent service: Not on file     Active member of club or organization: Not on file     Attends meetings of clubs or organizations: Not on file     Relationship status: Not on file     Intimate partner violence     Fear of current or ex partner: Not on file     Emotionally abused: Not on file     Physically abused: Not on file     Forced sexual activity: Not on file   Other Topics Concern     Not on file   Social History Narrative    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[employer]     - wife Jeanette    1 son- Will     Family History   Problem Relation Age of Onset     Cancer Mother         ovarian     Prostate Cancer Father 65     Cancer Brother 50        had colon removed, ?colon cancer     Other - See Comments Son         lymphedema     Current Outpatient Medications   Medication Sig Dispense Refill     amLODIPine (NORVASC) 5 MG tablet Take 1 tablet (5 mg) by mouth daily 90 tablet 3     aspirin (ASA) 81 MG tablet Take 1 tablet by mouth daily       atorvastatin (LIPITOR) 40 MG tablet Take 1 tablet (40 mg) by mouth daily 90 tablet 3     blood glucose (NO BRAND SPECIFIED) lancets standard Dispense item covered by insurance. Test blood sugar 6 times daily. 100 each 11     blood glucose (NO BRAND SPECIFIED) test strip Dispense item covered by insurance. Test blood sugar 6 times daily. 100 strip 11     blood glucose monitoring (NO BRAND SPECIFIED) meter device kit Dispense option covered by insurance. Test blood sugar 6 times daily. 1 kit 11     furosemide (LASIX) 20 MG tablet Take 1 tablet (20 mg) by mouth  daily 90 tablet 3     glucagon 1 MG kit Inject 1 mg into the muscle as needed       insulin aspart (NOVOLOG VIAL) 100 UNITS/ML vial USE AS DIRECTED MAX DAILY DOSE 70 UNITS 5 vial 1     lisinopril (ZESTRIL) 40 MG tablet Take 1 tablet (40 mg) by mouth daily 90 tablet 3     loratadine (CLARITIN) 10 MG tablet Take 10 mg by mouth daily       Multiple Vitamin (MULTI VITAMIN W/D-3) TABS Take 1 tablet by mouth daily       order for DME Automatic electronic sphygmomanometer including machine, cuff, tubing and all associated supplies. Essential Hypertension, chronic. Lifelong. 1 each 11     tamsulosin (FLOMAX) 0.4 MG capsule Take 1 capsule by mouth daily  3     Catheters [facility] Bard Magic3 14F ref #[id number] - use to self-catheterize 4 times daily.       Continuous Blood Gluc  (DEXCOM G6 ) RONALD        Continuous Blood Gluc Sensor (DEXCOM G6 SENSOR) MISC every 30 days       Continuous Blood Gluc Transmit (DEXCOM G6 TRANSMITTER) MISC        Patient has no known allergies.      Review of Systems:  Review of Systems  Denies erythema, warmth, redness, fever, chills and odorous and purulent drainage.    Objective:   BP (!) 152/70 (BP Location: Right arm, Patient Position: Sitting, Cuff Size: Adult Large)   Pulse 88   Temp 98.3  F (36.8  C) (Tympanic)   Resp 18   Wt 115.4 kg (254 lb 6.4 oz)   SpO2 98%   BMI 30.17 kg/m    Physical Exam  Pleasant gentleman no acute distress.  Affect normal.  Alert and oriented x4.  The wound bed has pink granulation tissue.  Wound is filling in at the center and has formed into 2 separate wounds.  These measure 1.5 x 1.5 x 0.1 cm at the proximal region and 1.3 x 2.5 x 0.1 cm at the distal region.  100% granulation tissue present.  No surrounding erythema or warmth.  Wound is irrigated with saline wash, calcium alginate applied to wound followed by 4 inch Allevyn gentle border light dressing and secured with Medipore tape.    Assessment:    ICD-10-CM    1. Diabetic ulcer of right heel  associated with type 1 diabetes mellitus, with fat layer exposed (H)  E10.621     L97.412    2. Diabetic peripheral neuropathy (H)  E11.42        Plan:   Continue with 3 times weekly dressing change by cleansing with soap and water, rinsing clear then gently drying and then applying calcium alginate followed by Allevyn bordered foam dressing.  If they are finding that the calcium alginate is sticking to the wound then that should be discontinued and then just continue with the Allevyn dressing.  Follow-up in wound clinic in 2 weeks, sooner with concerns.    HOWIE Humphreys   1/13/2021  9:43 AM

## 2021-01-14 NOTE — TELEPHONE ENCOUNTER
[facility] rep, Alba, states patient's order is ready to ship, but a balance on account from February 28, 2020 will need to be paid before the new shipment can be sent.      Patient notified, message relayed.    Bindu Spring RN, BSN, [facility]  1/14/2021 3:08 PM

## 2021-01-27 ENCOUNTER — OFFICE VISIT (OUTPATIENT)
Dept: INTERNAL MEDICINE | Facility: OTHER | Age: 56
End: 2021-01-27
Attending: NURSE PRACTITIONER
Payer: COMMERCIAL

## 2021-01-27 VITALS
OXYGEN SATURATION: 98 % | TEMPERATURE: 95 F | DIASTOLIC BLOOD PRESSURE: 80 MMHG | BODY MASS INDEX: 30.71 KG/M2 | WEIGHT: 259 LBS | RESPIRATION RATE: 18 BRPM | SYSTOLIC BLOOD PRESSURE: 168 MMHG | HEART RATE: 93 BPM

## 2021-01-27 DIAGNOSIS — E10.621 DIABETIC ULCER OF RIGHT HEEL ASSOCIATED WITH TYPE 1 DIABETES MELLITUS, WITH FAT LAYER EXPOSED (H): Primary | ICD-10-CM

## 2021-01-27 DIAGNOSIS — L97.412 DIABETIC ULCER OF RIGHT HEEL ASSOCIATED WITH TYPE 1 DIABETES MELLITUS, WITH FAT LAYER EXPOSED (H): Primary | ICD-10-CM

## 2021-01-27 DIAGNOSIS — E11.42 DIABETIC PERIPHERAL NEUROPATHY (H): ICD-10-CM

## 2021-01-27 PROCEDURE — 99212 OFFICE O/P EST SF 10 MIN: CPT | Performed by: NURSE PRACTITIONER

## 2021-01-27 ASSESSMENT — PAIN SCALES - GENERAL: PAINLEVEL: NO PAIN (0)

## 2021-01-27 NOTE — PROGRESS NOTES
Subjective:  He is here today to follow-up on diabetic ulcer of the right heel.  His wife is doing dressing change 3 times a week.  There is no drainage.  He does have some dry skin present.    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)     Benign essential hypertension     Urinary retention     Insulin pump in place     Mixed hyperlipidemia     Type 1 diabetes mellitus with stage 3 chronic kidney disease (H)     Chronic kidney disease, stage III (moderate)     Diabetic peripheral neuropathy (H)     Lower limb amputation, great toe, right (H)     Hx diabetic foot infection     Allergic conjunctivitis, bilateral     Past Medical History:   Diagnosis Date     Carpal tunnel syndrome, bilateral      Diabetic foot ulcer (H)      Hyperlipidemia      Hypertension      Microalbuminuria      Neuropathy      Retinopathy      Trigger finger of both hands      Type 1 diabetes (H) 1982     Ulnar nerve entrapment at elbow, left      Urinary retention      Past Surgical History:   Procedure Laterality Date     AMPUTATION Right     Great toe     CARPAL TUNNEL RELEASE RT/LT Bilateral      CLOSED REDUCTION, PERCUTANEOUS PINNING HIP      age 14, pinned for dislocation     COLONOSCOPY      Due 2022. has had two, history of polyps. recommend 5 year follow-up     ROTATOR CUFF REPAIR RT/LT Bilateral      Social History     Socioeconomic History     Marital status:      Spouse name: Not on file     Number of children: Not on file     Years of education: Not on file     Highest education level: Not on file   Occupational History     Not on file   Social Needs     Financial resource strain: Not on file     Food insecurity     Worry: Not on file     Inability: Not on file     Transportation needs     Medical: Not on file     Non-medical: Not on file   Tobacco Use     Smoking status: Never Smoker     Smokeless tobacco: Never Used     Tobacco comment: no ecig   Substance and  Sexual Activity     Alcohol use: Yes     Frequency: Monthly or less     Comment: little     Drug use: Never     Sexual activity: Not Currently   Lifestyle     Physical activity     Days per week: Not on file     Minutes per session: Not on file     Stress: Not on file   Relationships     Social connections     Talks on phone: Not on file     Gets together: Not on file     Attends Anabaptism service: Not on file     Active member of club or organization: Not on file     Attends meetings of clubs or organizations: Not on file     Relationship status: Not on file     Intimate partner violence     Fear of current or ex partner: Not on file     Emotionally abused: Not on file     Physically abused: Not on file     Forced sexual activity: Not on file   Other Topics Concern     Not on file   Social History Narrative    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    - wife Jeanette    1 son- Will     Family History   Problem Relation Age of Onset     Cancer Mother         ovarian     Prostate Cancer Father 65     Cancer Brother 50        had colon removed, ?colon cancer     Other - See Comments Son         lymphedema     Current Outpatient Medications   Medication Sig Dispense Refill     amLODIPine (NORVASC) 5 MG tablet Take 1 tablet (5 mg) by mouth daily 90 tablet 3     aspirin (ASA) 81 MG tablet Take 1 tablet by mouth daily       atorvastatin (LIPITOR) 40 MG tablet Take 1 tablet (40 mg) by mouth daily 90 tablet 3     blood glucose (NO BRAND SPECIFIED) lancets standard Dispense item covered by insurance. Test blood sugar 6 times daily. 100 each 11     blood glucose (NO BRAND SPECIFIED) test strip Dispense item covered by insurance. Test blood sugar 6 times daily. 100 strip 11     blood glucose monitoring (NO BRAND SPECIFIED) meter device kit Dispense option covered by insurance. Test blood sugar 6 times daily. 1 kit 11     Catheters [facility] Bard Magic3 14F ref #[id number] - use to self-catheterize 4 times  daily.       Continuous Blood Gluc  (DEXCOM G6 ) RONALD        Continuous Blood Gluc Sensor (DEXCOM G6 SENSOR) MISC every 30 days       Continuous Blood Gluc Transmit (DEXCOM G6 TRANSMITTER) MISC        furosemide (LASIX) 20 MG tablet Take 1 tablet (20 mg) by mouth daily 90 tablet 3     glucagon 1 MG kit Inject 1 mg into the muscle as needed       insulin aspart (NOVOLOG VIAL) 100 UNITS/ML vial USE AS DIRECTED MAX DAILY DOSE 70 UNITS 5 vial 1     lisinopril (ZESTRIL) 40 MG tablet Take 1 tablet (40 mg) by mouth daily 90 tablet 3     loratadine (CLARITIN) 10 MG tablet Take 10 mg by mouth daily       Multiple Vitamin (MULTI VITAMIN W/D-3) TABS Take 1 tablet by mouth daily       order for DME Automatic electronic sphygmomanometer including machine, cuff, tubing and all associated supplies. Essential Hypertension, chronic. Lifelong. 1 each 11     tamsulosin (FLOMAX) 0.4 MG capsule Take 1 capsule by mouth daily  3     Patient has no known allergies.      Review of Systems:  Review of Systems  Denies erythema, warmth, redness, fever, chills and odorous and purulent drainage.    Objective:   BP (!) 200/80 (BP Location: Right arm, Patient Position: Sitting, Cuff Size: Adult Large)   Pulse 93   Temp 95  F (35  C) (Tympanic)   Resp 18   Wt 117.5 kg (259 lb)   SpO2 98%   BMI 30.71 kg/m    Physical Exam  Pleasant gentleman no acute distress.  Affect normal.  Alert and oriented x4.  Wounds at heel have closed.  He did have some dry skin surrounding the wound that was easily removed without any disruption to the underlying skin.    Assessment:    ICD-10-CM    1. Diabetic ulcer of right heel associated with type 1 diabetes mellitus, with fat layer exposed (H)  E10.621     L97.412    2. Diabetic peripheral neuropathy (H)  E11.42        Plan:   Wound has healed.  Recommend applying lotion to the remaining dry skin.  Protect from friction and pressure.  Follow-up as needed.    Inocencia Marquez, HOWIE    1/27/2021  8:39 AM

## 2021-01-27 NOTE — NURSING NOTE
Blood pressure still high, 168/80.  Patient will comes in and get it checked again this week.  Stephanie De La Garza LPN ....................1/27/2021   8:46 AM

## 2021-01-27 NOTE — NURSING NOTE
"Patient comes in for wound follow up on right heel.  Stephanie De La Garza LPN ....................1/27/2021   8:25 AM  Chief Complaint   Patient presents with     Follow Up     wound on right heel       Initial BP (!) 200/80 (BP Location: Right arm, Patient Position: Sitting, Cuff Size: Adult Large)   Pulse 93   Temp 95  F (35  C) (Tympanic)   Resp 18   Wt 117.5 kg (259 lb)   SpO2 98%   BMI 30.71 kg/m   Estimated body mass index is 30.71 kg/m  as calculated from the following:    Height as of 5/26/20: 1.956 m (6' 5\").    Weight as of this encounter: 117.5 kg (259 lb).  Medication Reconciliation: complete    Stephanie De La Garza LPN    "

## 2021-02-18 ENCOUNTER — OFFICE VISIT (OUTPATIENT)
Dept: PEDIATRICS | Facility: OTHER | Age: 56
End: 2021-02-18
Attending: INTERNAL MEDICINE
Payer: COMMERCIAL

## 2021-02-18 VITALS
BODY MASS INDEX: 31.47 KG/M2 | HEART RATE: 87 BPM | OXYGEN SATURATION: 94 % | RESPIRATION RATE: 18 BRPM | WEIGHT: 265.4 LBS | SYSTOLIC BLOOD PRESSURE: 182 MMHG | TEMPERATURE: 97.3 F | DIASTOLIC BLOOD PRESSURE: 80 MMHG

## 2021-02-18 DIAGNOSIS — R60.0 BILATERAL LOWER EXTREMITY EDEMA: Primary | ICD-10-CM

## 2021-02-18 DIAGNOSIS — E10.59 TYPE 1 DIABETES MELLITUS WITH OTHER CIRCULATORY COMPLICATION (H): Chronic | ICD-10-CM

## 2021-02-18 DIAGNOSIS — E10.22 TYPE 1 DIABETES MELLITUS WITH STAGE 3B CHRONIC KIDNEY DISEASE (H): Chronic | ICD-10-CM

## 2021-02-18 DIAGNOSIS — N18.32 TYPE 1 DIABETES MELLITUS WITH STAGE 3B CHRONIC KIDNEY DISEASE (H): Chronic | ICD-10-CM

## 2021-02-18 DIAGNOSIS — I87.8 VENOUS STASIS OF BOTH LOWER EXTREMITIES: ICD-10-CM

## 2021-02-18 LAB
ALBUMIN SERPL-MCNC: 3 G/DL (ref 3.5–5.7)
ALP SERPL-CCNC: 65 U/L (ref 34–104)
ALT SERPL W P-5'-P-CCNC: 23 U/L (ref 7–52)
ANION GAP SERPL CALCULATED.3IONS-SCNC: 5 MMOL/L (ref 3–14)
AST SERPL W P-5'-P-CCNC: 22 U/L (ref 13–39)
BILIRUB SERPL-MCNC: 0.7 MG/DL (ref 0.3–1)
BUN SERPL-MCNC: 29 MG/DL (ref 7–25)
CALCIUM SERPL-MCNC: 8.6 MG/DL (ref 8.6–10.3)
CHLORIDE SERPL-SCNC: 104 MMOL/L (ref 98–107)
CO2 SERPL-SCNC: 26 MMOL/L (ref 21–31)
CREAT SERPL-MCNC: 1.46 MG/DL (ref 0.7–1.3)
ERYTHROCYTE [DISTWIDTH] IN BLOOD BY AUTOMATED COUNT: 13.1 % (ref 10–15)
GFR SERPL CREATININE-BSD FRML MDRD: 50 ML/MIN/{1.73_M2}
GLUCOSE SERPL-MCNC: 260 MG/DL (ref 70–105)
HBA1C MFR BLD: 10.3 % (ref 4–6)
HCT VFR BLD AUTO: 32.6 % (ref 40–53)
HGB BLD-MCNC: 11 G/DL (ref 13.3–17.7)
MCH RBC QN AUTO: 29.1 PG (ref 26.5–33)
MCHC RBC AUTO-ENTMCNC: 33.7 G/DL (ref 31.5–36.5)
MCV RBC AUTO: 86 FL (ref 78–100)
NT-PROBNP SERPL-MCNC: 132 PG/ML (ref 0–100)
PLATELET # BLD AUTO: 248 10E9/L (ref 150–450)
POTASSIUM SERPL-SCNC: 4.7 MMOL/L (ref 3.5–5.1)
PREALB SERPL IA-MCNC: 22 MG/DL (ref 17–34)
PROT SERPL-MCNC: 5.8 G/DL (ref 6.4–8.9)
RBC # BLD AUTO: 3.78 10E12/L (ref 4.4–5.9)
SODIUM SERPL-SCNC: 135 MMOL/L (ref 134–144)
TSH SERPL DL<=0.05 MIU/L-ACNC: 2.05 IU/ML (ref 0.34–5.6)
WBC # BLD AUTO: 6 10E9/L (ref 4–11)

## 2021-02-18 PROCEDURE — 83880 ASSAY OF NATRIURETIC PEPTIDE: CPT | Mod: ZL | Performed by: INTERNAL MEDICINE

## 2021-02-18 PROCEDURE — 36415 COLL VENOUS BLD VENIPUNCTURE: CPT | Mod: ZL | Performed by: INTERNAL MEDICINE

## 2021-02-18 PROCEDURE — 83036 HEMOGLOBIN GLYCOSYLATED A1C: CPT | Mod: ZL | Performed by: INTERNAL MEDICINE

## 2021-02-18 PROCEDURE — 99214 OFFICE O/P EST MOD 30 MIN: CPT | Performed by: INTERNAL MEDICINE

## 2021-02-18 PROCEDURE — 84443 ASSAY THYROID STIM HORMONE: CPT | Mod: ZL | Performed by: INTERNAL MEDICINE

## 2021-02-18 PROCEDURE — 85027 COMPLETE CBC AUTOMATED: CPT | Mod: ZL | Performed by: INTERNAL MEDICINE

## 2021-02-18 PROCEDURE — 84134 ASSAY OF PREALBUMIN: CPT | Mod: ZL | Performed by: INTERNAL MEDICINE

## 2021-02-18 PROCEDURE — 80053 COMPREHEN METABOLIC PANEL: CPT | Mod: ZL | Performed by: INTERNAL MEDICINE

## 2021-02-18 RX ORDER — FUROSEMIDE 20 MG
40 TABLET ORAL DAILY
Qty: 90 TABLET | Refills: 3 | Status: SHIPPED | OUTPATIENT
Start: 2021-02-18 | End: 2021-07-19

## 2021-02-18 ASSESSMENT — PAIN SCALES - GENERAL: PAINLEVEL: NO PAIN (0)

## 2021-02-18 NOTE — NURSING NOTE
"Chief Complaint   Patient presents with     Leg Swelling     Bilateral x 1 week      Patient presents for leg swelling that started about 1 week ago    Initial BP (!) 188/88 (BP Location: Right arm, Patient Position: Sitting, Cuff Size: Adult Large)   Pulse 87   Temp 97.3  F (36.3  C) (Tympanic)   Resp 18   Wt 120.4 kg (265 lb 6.4 oz)   SpO2 94%   BMI 31.47 kg/m   Estimated body mass index is 31.47 kg/m  as calculated from the following:    Height as of 5/26/20: 1.956 m (6' 5\").    Weight as of this encounter: 120.4 kg (265 lb 6.4 oz).  Medication Reconciliation: complete    Tamiko Burnham MA  "

## 2021-02-18 NOTE — PATIENT INSTRUCTIONS
-- Increase healthy protein     -- Low salt diet, under 2000 mg/day   -- Fluid restrict, under 2000 mL/day aka 8.5 cups/day   -- Learn about DASH Diet ([email or web address] - redirects to the [employer]) for dietary ways to reduce blood pressure   -- Elevate feet above your hips for 20-30 minutes, 4 times per day   -- Compression stockings from morning to night     -- Increase Lasix to 40 mg daily in AM   -- Send BP log to Dr Pro in 1 week      Check your Blood Pressure   -- Sit in a chair, feet flat on the floor for 5 minutes   -- Avoid caffeine, exercise and smoking for 30 minutes before checking   -- Have your arm at the level of your heart   -- Make sure you have the correct size cuff   -- Write them down, bring your log book in to your appointment     -- Goal blood pressure 120/70   -- Learn about DASH Diet ([email or web address] - redirects to the [employer]) for dietary ways to reduce blood pressure   -- Work on 5% weight loss   -- Daily physical exercise (eg. 30 min brisk walk)

## 2021-02-19 NOTE — PROGRESS NOTES
Subjective   Clemente Graham is a 56 year old male who presents for leg swelling. Worse in evening, better in AM. Was wearing comp sox, but then stopped.  No orthopnea, no PND. No chest pain. No lower ext sores. No diet changes. Rare alcohol. Has been sedentary due to cold weather.    Objective   Vitals: BP (!) 182/80 (BP Location: Right arm, Patient Position: Sitting, Cuff Size: Adult Large)   Pulse 87   Temp 97.3  F (36.3  C) (Tympanic)   Resp 18   Wt 120.4 kg (265 lb 6.4 oz)   SpO2 94%   BMI 31.47 kg/m      General: well appearing  CV: Regular rate and rhythm, no murmur, rub or gallop  Pulm: Clear to auscultation bilaterally, no wheezing, rales or rhonchi  Neuro: Grossly intact  Musculoskeletal: 1-2+ pitting bilateral lower extremity edema  Skin: No rash  Psychiatry: Normal affect and insight.    Review and Analysis of Data   I personally reviewed the following:  External notes: No  Results: Yes  Use of an independent historian: No  Independent review of a test performed by another physician: No  Discussion of management with another physician: No  Moderate risk of morbidity from additional diagnostic testing and/or treatment.    Assessment & Plan   1. Bilateral lower extremity edema  I believe edema is mostly due to venous stasis.  Low albumin is a contributor.  See pat instr below.    - N terminal pro BNP outpatient; Future  - CBC with platelets; Future  - Comprehensive metabolic panel; Future  - Hemoglobin A1c; Future  - Prealbumin; Future  - Prealbumin  - Hemoglobin A1c  - Comprehensive metabolic panel  - CBC with platelets  - N terminal pro BNP outpatient    2. Type 1 diabetes mellitus with other circulatory complication (H)  Improving  - Thyrotropin GH; Future  - Thyrotropin GH    3. Type 1 diabetes mellitus with stage 3b chronic kidney disease (H)  - Thyrotropin GH; Future  - Thyrotropin GH    4. Venous stasis of both lower extremities  He is not urinating much so we will increase.  We are limited by  hypoalbuminemia.  - furosemide (LASIX) 20 MG tablet; Take 2 tablets (40 mg) by mouth daily  Dispense: 90 tablet; Refill: 3        Patient Instructions    -- Increase healthy protein     -- Low salt diet, under 2000 mg/day   -- Fluid restrict, under 2000 mL/day aka 8.5 cups/day   -- Learn about DASH Diet ([email or web address]/DASHDiet - redirects to the [employer]) for dietary ways to reduce blood pressure   -- Elevate feet above your hips for 20-30 minutes, 4 times per day   -- Compression stockings from morning to night     -- Increase Lasix to 40 mg daily in AM   -- Send BP log to Dr Pro in 1 week      Check your Blood Pressure   -- Sit in a chair, feet flat on the floor for 5 minutes   -- Avoid caffeine, exercise and smoking for 30 minutes before checking   -- Have your arm at the level of your heart   -- Make sure you have the correct size cuff   -- Write them down, bring your log book in to your appointment     -- Goal blood pressure 120/70   -- Learn about DASH Diet (http://[email or web address]/DASHDiet - redirects to the [employer]) for dietary ways to reduce blood pressure   -- Work on 5% weight loss   -- Daily physical exercise (eg. 30 min brisk walk)         No follow-ups on file.    Tello Weldon MD, FAAP, FACP  Internal Medicine & Pediatrics

## 2021-02-23 ENCOUNTER — OFFICE VISIT (OUTPATIENT)
Dept: PEDIATRICS | Facility: OTHER | Age: 56
End: 2021-02-23
Attending: INTERNAL MEDICINE
Payer: COMMERCIAL

## 2021-02-23 VITALS
TEMPERATURE: 97.9 F | WEIGHT: 262.9 LBS | SYSTOLIC BLOOD PRESSURE: 128 MMHG | RESPIRATION RATE: 16 BRPM | HEART RATE: 83 BPM | BODY MASS INDEX: 31.18 KG/M2 | OXYGEN SATURATION: 96 % | DIASTOLIC BLOOD PRESSURE: 72 MMHG

## 2021-02-23 DIAGNOSIS — E10.59 TYPE 1 DIABETES MELLITUS WITH OTHER CIRCULATORY COMPLICATION (H): Primary | Chronic | ICD-10-CM

## 2021-02-23 DIAGNOSIS — Z23 NEED FOR ZOSTER VACCINATION: ICD-10-CM

## 2021-02-23 PROCEDURE — 90750 HZV VACC RECOMBINANT IM: CPT | Performed by: INTERNAL MEDICINE

## 2021-02-23 PROCEDURE — 90471 IMMUNIZATION ADMIN: CPT | Performed by: INTERNAL MEDICINE

## 2021-02-23 RX ORDER — FUROSEMIDE 20 MG
40 TABLET ORAL
COMMUNITY
Start: 2021-02-18 | End: 2021-02-23

## 2021-02-23 ASSESSMENT — PAIN SCALES - GENERAL: PAINLEVEL: NO PAIN (0)

## 2021-02-23 NOTE — NURSING NOTE
"Chief Complaint   Patient presents with     Forms     Hypertension     Edema     follow up      Patient presents for paperwork needing to be filled out in regards to his license, blood pressure check, and follow up on edema.     Initial /72 (BP Location: Right arm, Patient Position: Sitting, Cuff Size: Adult Large)   Pulse 83   Temp 97.9  F (36.6  C) (Tympanic)   Resp 16   Wt 119.3 kg (262 lb 14.4 oz)   SpO2 96%   BMI 31.18 kg/m   Estimated body mass index is 31.18 kg/m  as calculated from the following:    Height as of 5/26/20: 1.956 m (6' 5\").    Weight as of this encounter: 119.3 kg (262 lb 14.4 oz).  Medication Reconciliation: complete    Tamiko Burnham MA     Immunization Documentation    Prior to Immunization administration, verified patients identity using patient's name and date of birth. Please see IMMUNIZATIONS  and order for additional information.  Patient / Parent instructed to remain in clinic for 15 minutes and report any adverse reaction to staff immediately.    Was the entire amount of vaccines given used? Yes    Tamiko Burnham MA  2/23/2021   8:55 AM    "

## 2021-02-23 NOTE — PROGRESS NOTES
Subjective   Clemente Graham is a 56 year old male who presents for needs paperwork.    Objective   Vitals: /72 (BP Location: Right arm, Patient Position: Sitting, Cuff Size: Adult Large)   Pulse 83   Temp 97.9  F (36.6  C) (Tympanic)   Resp 16   Wt 119.3 kg (262 lb 14.4 oz)   SpO2 96%   BMI 31.18 kg/m        Assessment & Plan   1. Need for zoster vaccination  - [facility] IMM - HC ZOSTER VACCINE RECOMBINANT ADJUVANTED IM NJX (SHINGRIX)    2. Type 1 diabetes mellitus with other circulatory complication (H)     -- DMV form signed.    Signed, Tello Pro MD, FAAP, FACP  Internal Medicine & Pediatrics

## 2021-04-08 ENCOUNTER — OFFICE VISIT (OUTPATIENT)
Dept: PEDIATRICS | Facility: OTHER | Age: 56
End: 2021-04-08
Attending: INTERNAL MEDICINE
Payer: COMMERCIAL

## 2021-04-08 VITALS
SYSTOLIC BLOOD PRESSURE: 134 MMHG | TEMPERATURE: 97.1 F | WEIGHT: 259.1 LBS | BODY MASS INDEX: 30.59 KG/M2 | HEART RATE: 98 BPM | DIASTOLIC BLOOD PRESSURE: 70 MMHG | HEIGHT: 77 IN | OXYGEN SATURATION: 99 % | RESPIRATION RATE: 16 BRPM

## 2021-04-08 DIAGNOSIS — N18.31 STAGE 3A CHRONIC KIDNEY DISEASE (H): ICD-10-CM

## 2021-04-08 DIAGNOSIS — E78.2 MIXED HYPERLIPIDEMIA: Chronic | ICD-10-CM

## 2021-04-08 DIAGNOSIS — I87.8 VENOUS STASIS OF BOTH LOWER EXTREMITIES: Primary | ICD-10-CM

## 2021-04-08 DIAGNOSIS — I10 BENIGN ESSENTIAL HYPERTENSION: ICD-10-CM

## 2021-04-08 DIAGNOSIS — I10 ESSENTIAL HYPERTENSION: ICD-10-CM

## 2021-04-08 DIAGNOSIS — N40.0 BENIGN PROSTATIC HYPERPLASIA, UNSPECIFIED WHETHER LOWER URINARY TRACT SYMPTOMS PRESENT: ICD-10-CM

## 2021-04-08 DIAGNOSIS — E10.59 TYPE 1 DIABETES MELLITUS WITH OTHER CIRCULATORY COMPLICATION (H): Chronic | ICD-10-CM

## 2021-04-08 LAB
ANION GAP SERPL CALCULATED.3IONS-SCNC: 5 MMOL/L (ref 3–14)
BUN SERPL-MCNC: 32 MG/DL (ref 7–25)
CALCIUM SERPL-MCNC: 9.1 MG/DL (ref 8.6–10.3)
CHLORIDE SERPL-SCNC: 99 MMOL/L (ref 98–107)
CO2 SERPL-SCNC: 28 MMOL/L (ref 21–31)
CREAT SERPL-MCNC: 1.91 MG/DL (ref 0.7–1.3)
GFR SERPL CREATININE-BSD FRML MDRD: 37 ML/MIN/{1.73_M2}
GLUCOSE SERPL-MCNC: 611 MG/DL (ref 70–105)
POTASSIUM SERPL-SCNC: 4.9 MMOL/L (ref 3.5–5.1)
SODIUM SERPL-SCNC: 132 MMOL/L (ref 134–144)

## 2021-04-08 PROCEDURE — 99214 OFFICE O/P EST MOD 30 MIN: CPT | Performed by: INTERNAL MEDICINE

## 2021-04-08 PROCEDURE — 80048 BASIC METABOLIC PNL TOTAL CA: CPT | Mod: ZL | Performed by: INTERNAL MEDICINE

## 2021-04-08 PROCEDURE — 36415 COLL VENOUS BLD VENIPUNCTURE: CPT | Mod: ZL | Performed by: INTERNAL MEDICINE

## 2021-04-08 RX ORDER — ATORVASTATIN CALCIUM 40 MG/1
40 TABLET, FILM COATED ORAL DAILY
Qty: 90 TABLET | Refills: 3 | Status: SHIPPED | OUTPATIENT
Start: 2021-04-08 | End: 2022-03-08

## 2021-04-08 RX ORDER — LISINOPRIL 40 MG/1
40 TABLET ORAL DAILY
Qty: 90 TABLET | Refills: 3 | Status: SHIPPED | OUTPATIENT
Start: 2021-04-08 | End: 2022-03-08

## 2021-04-08 RX ORDER — AMLODIPINE BESYLATE 5 MG/1
5 TABLET ORAL DAILY
Qty: 90 TABLET | Refills: 3 | Status: CANCELLED | OUTPATIENT
Start: 2021-04-08

## 2021-04-08 RX ORDER — TAMSULOSIN HYDROCHLORIDE 0.4 MG/1
0.4 CAPSULE ORAL DAILY
Qty: 90 CAPSULE | Refills: 3 | Status: SHIPPED | OUTPATIENT
Start: 2021-04-08 | End: 2022-03-08

## 2021-04-08 ASSESSMENT — MIFFLIN-ST. JEOR: SCORE: 2119.03

## 2021-04-08 ASSESSMENT — PAIN SCALES - GENERAL: PAINLEVEL: NO PAIN (0)

## 2021-04-08 NOTE — PATIENT INSTRUCTIONS
-- Low salt diet, under 2000 mg/day   -- Fluid restrict, under 2000 mL/day aka 8.5 cups/day   -- Learn about DASH Diet ([email or web address].Cellmax/DASHDiet - redirects to the [employer]) for dietary ways to reduce blood pressure   -- Elevate feet above your hips for 20-30 minutes, 4 times per day   -- Compression stockings from morning to night   -- Work on 5-10% weight loss     -- Schedule COLLEEN test   -- Schedule consult with Chastity to consider Unna boots     -- Stop amlodipine   -- Check BP daily for at least a week   -- Notify Dr. Pro in clinic of over 150 more often than not    Check your Blood Pressure   -- Sit in a chair, feet flat on the floor for 5 minutes   -- Avoid caffeine, exercise and smoking for 30 minutes before checking   -- Have your arm at the level of your heart   -- Make sure you have the correct size cuff   -- Write them down, bring your log book in to your appointment     -- Goal blood pressure 120/70   -- Learn about DASH Diet (http://[employer]/DASHDiet - redirects to the [employer]) for dietary ways to reduce blood pressure   -- Work on 5% weight loss   -- Daily physical exercise (eg. 30 min brisk walk)       Patient Education     Understanding Chronic Venous Insufficiency  Problems with the veins in the legs may lead to chronic venous insufficiency (CVI). CVI means that there is a long-term problem with the veins not being able to pump blood back to your heart. When this happens, blood stays in the legs and causes swelling and aching.   Two problems that may lead to chronic venous insufficiency are:    Damaged valves. Valves keep blood flowing from the legs through the blood vessels and back to the heart. When the valves are damaged, blood does not flow as well.     Deep vein thrombosis (DVT).  Blood clots may form in the deep veins of the legs. This may cause pain, redness, and swelling in the legs. It may also block the flow of blood back to the heart. Seek medical care right away if you have these symptoms.  A blood  clot in the leg can also break off and travel to the lungs. This is called  pulmonary embolism (PE).  In the lungs, the clot can cut off the flow of blood. This may cause chest pain, trouble breathing, sweating, a fast heartbeat, coughing (may cough up blood), and fainting. This is a medical emergency and may cause death. Call 911 if you have these symptoms.  CVI can t be cured, but you can control leg swelling to reduce the likelihood of sores (ulcers).  Recognizing the symptoms  Be aware of the following:    If you stand or sit with your feet down for long periods, your legs may ache or feel heavy.    Swollen ankles are possibly the most common symptom of CVI.    As swelling increases, the skin over your ankles may show red spots or a brownish tinge. The skin may feel leathery or scaly, and may start to itch.    If swelling is not controlled, an ulcer (open wound) may form.  What you can do  Reduce your risk of developing ulcers by doing the following:    Increase blood flow back to your heart by elevating your legs, exercising daily, and wearing elastic stockings.    Boost blood flow in your legs by losing extra weight.    If you must stand or sit in one place for a period of time, keep your blood moving by wiggling your toes, shifting your body position, and rising up on the balls of your feet.    StayWell last reviewed this educational content on 10/1/2019    8669-9479 The [employer]Well [employer]. All rights reserved. This information is not intended as a substitute for professional medical care. Always follow your healthcare professional's instructions.

## 2021-04-08 NOTE — PROGRESS NOTES
"Subjective   Clemente Graham is a 56 year old male who presents for leg swelling.  His legs continue to swell.  They are good in the morning but bad by the evening.  He tried wearing compression stockings but they pinch his toes and he was worried he would get a sore.  His blood sugars have been a little better controlled lately.  He tries to eat a low-salt diet but has some salt throughout the day.  He does not drink to excess.    Objective   Vitals: /70 (BP Location: Right arm, Patient Position: Sitting, Cuff Size: Adult Large)   Pulse 98   Temp 97.1  F (36.2  C) (Tympanic)   Resp 16   Ht 1.95 m (6' 4.77\")   Wt 117.5 kg (259 lb 1.6 oz)   SpO2 99%   BMI 30.91 kg/m      Musculoskeletal: 2-3+ pitting edema of the lower extremities.    Review and Analysis of Data   I personally reviewed the following:  External notes: No  Results: Yes  Use of an independent historian: No  Independent review of a test performed by another physician: No  Discussion of management with another physician: No  Moderate risk of morbidity from additional diagnostic testing and/or treatment.    Assessment & Plan   1. Venous stasis of both lower extremities  I think most likely etiology is from venous stasis disease however differential diagnosis also includes peripheral arterial disease, adverse medication reaction to amlodipine, fluid shifting from protein malnutrition, fluid shifting from hyperglycemia, others.  Since he is having a very difficult time with compression stockings I would like Chastity to see him to see if she would recommend Unna boots.    - US COLLEEN with PPG wo Exercise Bilateral; Future  - WOUND CARE REFERRAL; Future  - Basic metabolic panel; Future  - Basic metabolic panel    2. Type 1 diabetes mellitus with other circulatory complication (H)  Recommend improved glycemic control  - Basic metabolic panel; Future  - Basic metabolic panel    3. Benign essential hypertension  We are going to try off of amlodipine.  We " may need to add another agent.  - lisinopril (ZESTRIL) 40 MG tablet; Take 1 tablet (40 mg) by mouth daily  Dispense: 90 tablet; Refill: 3    4. Mixed hyperlipidemia  At goal, no change.  - atorvastatin (LIPITOR) 40 MG tablet; Take 1 tablet (40 mg) by mouth daily  Dispense: 90 tablet; Refill: 3    5. Essential hypertension  At goal, no change.  - Basic metabolic panel; Future  - Basic metabolic panel    6. Benign prostatic hyperplasia, unspecified whether lower urinary tract symptoms present  At goal, no change.  - tamsulosin (FLOMAX) 0.4 MG capsule; Take 1 capsule (0.4 mg) by mouth daily  Dispense: 90 capsule; Refill: 3    7. Stage 3a chronic kidney disease  At goal, no change.  - Basic metabolic panel; Future  - Basic metabolic panel      Patient Instructions    -- Low salt diet, under 2000 mg/day   -- Fluid restrict, under 2000 mL/day aka 8.5 cups/day   -- Learn about DASH Diet (http://[employer]/DASHDiet - redirects to the [employer]) for dietary ways to reduce blood pressure   -- Elevate feet above your hips for 20-30 minutes, 4 times per day   -- Compression stockings from morning to night   -- Work on 5-10% weight loss     -- Schedule COLLEEN test   -- Schedule consult with Chastity to consider Unna boots     -- Stop amlodipine   -- Check BP daily for at least a week   -- Notify Dr. Pro in clinic of over 150 more often than not    Check your Blood Pressure   -- Sit in a chair, feet flat on the floor for 5 minutes   -- Avoid caffeine, exercise and smoking for 30 minutes before checking   -- Have your arm at the level of your heart   -- Make sure you have the correct size cuff   -- Write them down, bring your log book in to your appointment     -- Goal blood pressure 120/70   -- Learn about DASH Diet (http://[employer]/DASHDiet - redirects to the [employer]) for dietary ways to reduce blood pressure   -- Work on 5% weight loss   -- Daily physical exercise (eg. 30 min brisk walk)       Patient Education     Understanding Chronic Venous  Insufficiency  Problems with the veins in the legs may lead to chronic venous insufficiency (CVI). CVI means that there is a long-term problem with the veins not being able to pump blood back to your heart. When this happens, blood stays in the legs and causes swelling and aching.   Two problems that may lead to chronic venous insufficiency are:    Damaged valves. Valves keep blood flowing from the legs through the blood vessels and back to the heart. When the valves are damaged, blood does not flow as well.     Deep vein thrombosis (DVT).  Blood clots may form in the deep veins of the legs. This may cause pain, redness, and swelling in the legs. It may also block the flow of blood back to the heart. Seek medical care right away if you have these symptoms.  A blood clot in the leg can also break off and travel to the lungs. This is called  pulmonary embolism (PE).  In the lungs, the clot can cut off the flow of blood. This may cause chest pain, trouble breathing, sweating, a fast heartbeat, coughing (may cough up blood), and fainting. This is a medical emergency and may cause death. Call 911 if you have these symptoms.  CVI can t be cured, but you can control leg swelling to reduce the likelihood of sores (ulcers).  Recognizing the symptoms  Be aware of the following:    If you stand or sit with your feet down for long periods, your legs may ache or feel heavy.    Swollen ankles are possibly the most common symptom of CVI.    As swelling increases, the skin over your ankles may show red spots or a brownish tinge. The skin may feel leathery or scaly, and may start to itch.    If swelling is not controlled, an ulcer (open wound) may form.  What you can do  Reduce your risk of developing ulcers by doing the following:    Increase blood flow back to your heart by elevating your legs, exercising daily, and wearing elastic stockings.    Boost blood flow in your legs by losing extra weight.    If you must stand or sit in  one place for a period of time, keep your blood moving by wiggling your toes, shifting your body position, and rising up on the balls of your feet.    [employer] last reviewed this educational content on 10/1/2019    0338-7891 The StayWell [employer], [employer]. All rights reserved. This information is not intended as a substitute for professional medical care. Always follow your healthcare professional's instructions.                     Return if symptoms worsen or fail to improve.    Tello Weldon MD, FAAP, FACP  Internal Medicine & Pediatrics

## 2021-04-20 ENCOUNTER — OFFICE VISIT (OUTPATIENT)
Dept: INTERNAL MEDICINE | Facility: OTHER | Age: 56
End: 2021-04-20
Attending: INTERNAL MEDICINE
Payer: COMMERCIAL

## 2021-04-20 VITALS
SYSTOLIC BLOOD PRESSURE: 136 MMHG | OXYGEN SATURATION: 98 % | BODY MASS INDEX: 30.85 KG/M2 | WEIGHT: 258.6 LBS | HEART RATE: 90 BPM | TEMPERATURE: 97.7 F | RESPIRATION RATE: 18 BRPM | DIASTOLIC BLOOD PRESSURE: 70 MMHG

## 2021-04-20 DIAGNOSIS — R82.90 ABNORMAL URINALYSIS: Primary | ICD-10-CM

## 2021-04-20 DIAGNOSIS — N18.32 TYPE 1 DIABETES MELLITUS WITH STAGE 3B CHRONIC KIDNEY DISEASE (H): Chronic | ICD-10-CM

## 2021-04-20 DIAGNOSIS — R80.9 PROTEINURIA, UNSPECIFIED TYPE: Primary | ICD-10-CM

## 2021-04-20 DIAGNOSIS — I87.8 VENOUS STASIS OF BOTH LOWER EXTREMITIES: ICD-10-CM

## 2021-04-20 DIAGNOSIS — R60.0 BILATERAL EDEMA OF LOWER EXTREMITY: Primary | ICD-10-CM

## 2021-04-20 DIAGNOSIS — E10.22 TYPE 1 DIABETES MELLITUS WITH STAGE 3B CHRONIC KIDNEY DISEASE (H): Chronic | ICD-10-CM

## 2021-04-20 DIAGNOSIS — E44.0 MODERATE PROTEIN-CALORIE MALNUTRITION (H): ICD-10-CM

## 2021-04-20 LAB
ALBUMIN UR-MCNC: 100 MG/DL
APPEARANCE UR: CLEAR
BILIRUB UR QL STRIP: NEGATIVE
COLOR UR AUTO: YELLOW
GLUCOSE UR STRIP-MCNC: NEGATIVE MG/DL
HGB UR QL STRIP: ABNORMAL
HYALINE CASTS #/AREA URNS LPF: 1 /LPF (ref 0–2)
KETONES UR STRIP-MCNC: NEGATIVE MG/DL
LEUKOCYTE ESTERASE UR QL STRIP: ABNORMAL
NITRATE UR QL: NEGATIVE
PH UR STRIP: 5.5 PH (ref 5–7)
RBC #/AREA URNS AUTO: 5 /HPF (ref 0–2)
SOURCE: ABNORMAL
SP GR UR STRIP: 1.01 (ref 1–1.03)
UROBILINOGEN UR STRIP-MCNC: NORMAL MG/DL (ref 0–2)
WBC #/AREA URNS AUTO: 28 /HPF (ref 0–5)

## 2021-04-20 PROCEDURE — 99213 OFFICE O/P EST LOW 20 MIN: CPT | Performed by: NURSE PRACTITIONER

## 2021-04-20 PROCEDURE — 81001 URINALYSIS AUTO W/SCOPE: CPT | Mod: ZL | Performed by: NURSE PRACTITIONER

## 2021-04-20 PROCEDURE — 87086 URINE CULTURE/COLONY COUNT: CPT | Mod: ZL | Performed by: NURSE PRACTITIONER

## 2021-04-20 ASSESSMENT — ENCOUNTER SYMPTOMS
COUGH: 0
PALPITATIONS: 0
CHEST TIGHTNESS: 0
WOUND: 0
SHORTNESS OF BREATH: 0

## 2021-04-20 ASSESSMENT — PAIN SCALES - GENERAL: PAINLEVEL: NO PAIN (0)

## 2021-04-20 NOTE — PROGRESS NOTES
Subjective:  He is here today for bilateral edema of lower extremities.  He has been diagnosed in the past with venous stasis disease.  He has compression stockings which he was fitted for a few months ago.  He has not been wearing those recently because he is concerned that they may cause ulcers on his feet because they are quite tight over the toes.  He does find that it helps with the edema.  He reports that the swelling is usually worse in the evenings and better in the mornings when he gets out of bed.  He works long days was not able to elevate his legs really throughout the day.  He was seen by primary care physician and had extensive work-up recently.  He was started on Lasix.  His weight has come down about 7 pounds since starting that in February.  He was also found to have low protein levels with an albumin of 3 in February.  Prealbumin was normal back in December.  He has an insulin pump for treatment of type 1 diabetes.  He has had historically poorly controlled diabetes.  Most recent A1c at 10.3% in February.  The edema is symmetrical and there is no erythema or warmth associated.  No calf pain.  No previous history of DVTs.  He is supposed to be getting ABIs scheduled but has not done so yet.  He has diabetic neuropathy but denies cold pale digits.    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)     Benign essential hypertension     Urinary retention     Insulin pump in place     Mixed hyperlipidemia     Type 1 diabetes mellitus with stage 3 chronic kidney disease (H)     Chronic kidney disease, stage III (moderate)     Diabetic peripheral neuropathy (H)     Lower limb amputation, great toe, right (H)     Hx diabetic foot infection     Allergic conjunctivitis, bilateral     Moderate protein-calorie malnutrition (H)     Past Medical History:   Diagnosis Date     Carpal tunnel syndrome, bilateral      Diabetic foot ulcer (H)      Hyperlipidemia       Hypertension      Microalbuminuria      Neuropathy      Retinopathy      Trigger finger of both hands      Type 1 diabetes (H) 1982     Ulnar nerve entrapment at elbow, left      Urinary retention      Past Surgical History:   Procedure Laterality Date     AMPUTATION Right     Great toe     CARPAL TUNNEL RELEASE RT/LT Bilateral      CLOSED REDUCTION, PERCUTANEOUS PINNING HIP      age 14, pinned for dislocation     COLONOSCOPY      Due 2022. has had two, history of polyps. recommend 5 year follow-up     ROTATOR CUFF REPAIR RT/LT Bilateral      Social History     Socioeconomic History     Marital status:      Spouse name: Not on file     Number of children: Not on file     Years of education: Not on file     Highest education level: Not on file   Occupational History     Not on file   Social Needs     Financial resource strain: Not on file     Food insecurity     Worry: Not on file     Inability: Not on file     Transportation needs     Medical: Not on file     Non-medical: Not on file   Tobacco Use     Smoking status: Never Smoker     Smokeless tobacco: Never Used     Tobacco comment: no ecig   Substance and Sexual Activity     Alcohol use: Yes     Frequency: Monthly or less     Comment: little     Drug use: Never     Sexual activity: Not Currently   Lifestyle     Physical activity     Days per week: Not on file     Minutes per session: Not on file     Stress: Not on file   Relationships     Social connections     Talks on phone: Not on file     Gets together: Not on file     Attends Congregational service: Not on file     Active member of club or organization: Not on file     Attends meetings of clubs or organizations: Not on file     Relationship status: Not on file     Intimate partner violence     Fear of current or ex partner: Not on file     Emotionally abused: Not on file     Physically abused: Not on file     Forced sexual activity: Not on file   Other Topics Concern     Not on file   Social History  Narrative    Works at [employer] as a [employer]     - wife Jeanette    1 son- Will     Family History   Problem Relation Age of Onset     Cancer Mother         ovarian     Prostate Cancer Father 65     Cancer Brother 50        had colon removed, ?colon cancer     Other - See Comments Son         lymphedema     Current Outpatient Medications   Medication Sig Dispense Refill     aspirin (ASA) 81 MG tablet Take 1 tablet by mouth daily       atorvastatin (LIPITOR) 40 MG tablet Take 1 tablet (40 mg) by mouth daily 90 tablet 3     blood glucose (NO BRAND SPECIFIED) lancets standard Dispense item covered by insurance. Test blood sugar 6 times daily. 100 each 11     blood glucose (NO BRAND SPECIFIED) test strip Dispense item covered by insurance. Test blood sugar 6 times daily. 100 strip 11     blood glucose monitoring (NO BRAND SPECIFIED) meter device kit Dispense option covered by insurance. Test blood sugar 6 times daily. 1 kit 11     Catheters [facility] [place] Magic3 14F ref #[id number] - use to self-catheterize 4 times daily.       Continuous Blood Gluc  (DEXCOM G6 ) RONALD        Continuous Blood Gluc Sensor (DEXCOM G6 SENSOR) MISC every 30 days       Continuous Blood Gluc Transmit (DEXCOM G6 TRANSMITTER) MISC        furosemide (LASIX) 20 MG tablet Take 2 tablets (40 mg) by mouth daily 90 tablet 3     glucagon 1 MG kit Inject 1 mg into the muscle as needed       insulin aspart (NOVOLOG VIAL) 100 UNITS/ML vial USE AS DIRECTED MAX DAILY DOSE 70 UNITS 5 vial 1     lisinopril (ZESTRIL) 40 MG tablet Take 1 tablet (40 mg) by mouth daily 90 tablet 3     loratadine (CLARITIN) 10 MG tablet Take 10 mg by mouth daily       Multiple Vitamin (MULTI VITAMIN W/D-3) TABS Take 1 tablet by mouth daily       order for [facility] Automatic electronic sphygmomanometer including machine, cuff, tubing and all associated supplies. Essential Hypertension, chronic. Lifelong. 1 each 11     tamsulosin (FLOMAX) 0.4 MG  capsule Take 1 capsule (0.4 mg) by mouth daily 90 capsule 3     Patient has no known allergies.      Review of Systems:  Review of Systems   Respiratory: Negative for cough, chest tightness and shortness of breath.    Cardiovascular: Positive for peripheral edema. Negative for chest pain and palpitations.   Skin: Negative for wound.       Objective:   /70   Pulse 90   Temp 97.7  F (36.5  C) (Tympanic)   Resp 18   Wt 117.3 kg (258 lb 9.6 oz)   SpO2 98%   BMI 30.85 kg/m    Physical Exam  Pleasant gentleman in usual state of health.  Affect normal.  Alert and oriented x4.  Bilateral lower extremities with 1-2+ pitting edema.  Bilateral DP PT 2+.  Capillary refill less than 3 seconds.  Very little hair growth on his toes.  Digits are warm and pink.  No evidence of ulceration.  Positive DPN.  No deep calf tenderness or venous ulcers.  February 2021 albumin 3.0, GFR 50, hemoglobin A1c 10.3%.   December 2020 prealbumin 22 September 2020 TSH 1.9    Assessment:    ICD-10-CM    1. Bilateral edema of lower extremity  R60.0 UA reflex to Microscopic   2. Venous stasis of both lower extremities  I87.8 WOUND CARE REFERRAL   3. Moderate protein-calorie malnutrition (H)  E44.0    4. Type 1 diabetes mellitus with stage 3b chronic kidney disease (H)  E10.21     N18.32        Plan:   Patient's bilateral edema appears to be multifactorial secondary to venous stasis and moderate protein calorie malnutrition.  He will have ABIs scheduled which has been ordered by his PCP.  I recommend that he begin wearing compression stockings to be placed on in the morning and removed at bedtime.  He should check his feet daily for any pressure areas or sores.  Also would recommend that he pulled the stocking toe slightly outward so that its not putting pressure directly over the toes.  He needs to increase protein in his diet.  Would recommend doing this by adding lean meat sources to at least 2 of his 3 meals per day.  Other alternative  would be to add a protein shake with 20 g of protein daily but need to do this cautiously as to make sure he finds a protein supplement that is low in carbohydrate so it does not raise his sugars.  He needs improve diabetic control.  Would recommend follow-up in 6-8 weeks to have protein levels reevaluated.  We will also add urinalysis to evaluate for any protein in his urine.  Could consider juxta light compression garments if he continues to have pressure concerns from compression stockings.  He will monitor feet daily and report any ulcerations or pressure areas.    HOWIE Humphreys   4/20/2021  8:52 AM

## 2021-04-20 NOTE — PATIENT INSTRUCTIONS
Increase protein in your diet.  Could consider adding 1 protein shake per day but make sure it is low in carbohydrates so it does not raise your blood sugars.  Foods sources such a lean meat are a better alternative.  Wear compression stockings daily.  On in morning, off at bedtime.  Follow up in clinic in 6-8 weeks to have blood drawn to check protein levels.  Could consider fitting for JuxtaLite wraps if not improving.  You will hopefully find improvement in your swelling once protein levels are adequate.  Also leg elevation above heart, 1 hour 2-3 times daily can help if you are able to do so.

## 2021-04-22 LAB
BACTERIA SPEC CULT: NORMAL
SPECIMEN SOURCE: NORMAL

## 2021-05-03 ENCOUNTER — APPOINTMENT (OUTPATIENT)
Dept: LAB | Facility: OTHER | Age: 56
End: 2021-05-03
Attending: NURSE PRACTITIONER
Payer: COMMERCIAL

## 2021-05-03 ENCOUNTER — HOSPITAL ENCOUNTER (OUTPATIENT)
Dept: ULTRASOUND IMAGING | Facility: OTHER | Age: 56
End: 2021-05-03
Attending: INTERNAL MEDICINE
Payer: COMMERCIAL

## 2021-05-03 DIAGNOSIS — M79.89 LEG SWELLING: ICD-10-CM

## 2021-05-03 DIAGNOSIS — I87.8 VENOUS STASIS OF BOTH LOWER EXTREMITIES: ICD-10-CM

## 2021-05-03 PROCEDURE — 93922 UPR/L XTREMITY ART 2 LEVELS: CPT

## 2021-05-18 DIAGNOSIS — R31.29 MICROSCOPIC HEMATURIA: ICD-10-CM

## 2021-05-18 DIAGNOSIS — R60.0 BILATERAL EDEMA OF LOWER EXTREMITY: ICD-10-CM

## 2021-05-18 DIAGNOSIS — R80.9 PROTEINURIA, UNSPECIFIED TYPE: Primary | ICD-10-CM

## 2021-05-18 DIAGNOSIS — R80.9 NEPHROTIC RANGE PROTEINURIA: ICD-10-CM

## 2021-05-18 LAB
ALBUMIN MFR UR ELPH: 588 MG/DL (ref 1–14)
COLLECT DURATION TIME UR: 24 H
CREAT 24H UR-MRATE: 1.16 G/(24.H) (ref 1–2)
CREAT UR-MCNC: 75 MG/DL
PROT 24H UR-MRATE: 9114 G/(24.H)
PROT/CREAT 24H UR: 7.84 MG/G{CREAT}
SPECIMEN VOL UR: 1550 ML

## 2021-05-18 PROCEDURE — 84166 PROTEIN E-PHORESIS/URINE/CSF: CPT | Mod: 26 | Performed by: PATHOLOGY

## 2021-05-18 PROCEDURE — 84166 PROTEIN E-PHORESIS/URINE/CSF: CPT | Mod: TC,ZL | Performed by: NURSE PRACTITIONER

## 2021-05-19 LAB
ALBUMIN MFR UR ELPH: 76.1 %
ALPHA1 GLOB MFR UR ELPH: 4.1 %
ALPHA2 GLOB MFR UR ELPH: 3.7 %
B-GLOBULIN MFR UR ELPH: 6.3 %
GAMMA GLOB MFR UR ELPH: 9.8 %
M PROTEIN MFR UR ELPH: 0 %
PROT PATTERN UR ELPH-IMP: ABNORMAL

## 2021-06-02 ENCOUNTER — OFFICE VISIT (OUTPATIENT)
Dept: UROLOGY | Facility: OTHER | Age: 56
End: 2021-06-02
Attending: NURSE PRACTITIONER
Payer: COMMERCIAL

## 2021-06-02 VITALS
HEART RATE: 88 BPM | WEIGHT: 261 LBS | BODY MASS INDEX: 31.13 KG/M2 | SYSTOLIC BLOOD PRESSURE: 148 MMHG | RESPIRATION RATE: 18 BRPM | DIASTOLIC BLOOD PRESSURE: 78 MMHG

## 2021-06-02 DIAGNOSIS — R31.29 MICROSCOPIC HEMATURIA: ICD-10-CM

## 2021-06-02 DIAGNOSIS — R31.29 MICROHEMATURIA: Primary | ICD-10-CM

## 2021-06-02 PROCEDURE — 99204 OFFICE O/P NEW MOD 45 MIN: CPT | Performed by: UROLOGY

## 2021-06-02 ASSESSMENT — PAIN SCALES - GENERAL: PAINLEVEL: NO PAIN (0)

## 2021-06-02 NOTE — NURSING NOTE
Pt presents to clinic for microhematuria consult    Review of Systems:    Weight loss:    No     Recent fever/chills:  No   Night sweats:   No  Current skin rash:  No   Recent hair loss:  No  Heat intolerance:  No   Cold intolerance:  No  Chest pain:   No   Palpitations:   No  Shortness of breath:  No   Wheezing:   No  Constipation:    No   Diarrhea:   No   Nausea:   No   Vomiting:   No   Kidney/side pain:  No   Back pain:   No  Frequent headaches:  No   Dizziness:     No  Leg swelling:   Yes   Calf pain:    No    Parents, brothers or sisters with history of kidney cancer:   No  Parents, brothers or sisters with history of bladder cancer: No  Father or brother with history of prostate cancer:  YES  FATHER

## 2021-06-02 NOTE — PROGRESS NOTES
Type of Visit  NPV    Chief Complaint  Hematuria    HPI  Mr. Graham is a 56 year old male w/h/o DM1 and CKD who presents with sterile microhematuria.  Microhematuria was first noted on UA 1 month ago.  Patient denies associated dysuria at the time of the UA.  No recent UTIs or other urologic issues.  He denies a history of gross hematuria.  He has never undergone a work-up in the past for hematuria.  Minimal LUTS.    Hematuria-related signs/symptoms  History of smoking?    No  History of chemotherapy?   No  History of pelvic radiation?   No  History of kidney or bladder stones?  No  History of frequent urinary tract infections? No      Past Medical History  He  has a past medical history of Carpal tunnel syndrome, bilateral, Diabetic foot ulcer (H), Hyperlipidemia, Hypertension, Microalbuminuria, Neuropathy, Retinopathy, Trigger finger of both hands, Type 1 diabetes (H) (1982), Ulnar nerve entrapment at elbow, left, and Urinary retention.  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)     Benign essential hypertension     Urinary retention     Insulin pump in place     Mixed hyperlipidemia     Type 1 diabetes mellitus with stage 3 chronic kidney disease (H)     Chronic kidney disease, stage III (moderate)     Diabetic peripheral neuropathy (H)     Lower limb amputation, great toe, right (H)     Hx diabetic foot infection     Allergic conjunctivitis, bilateral     Moderate protein-calorie malnutrition (H)       Past Surgical History  He  has a past surgical history that includes amputation (Right); rotator cuff repair rt/lt (Bilateral); carpal tunnel release rt/lt (Bilateral); Closed reduction, percutaneous pinning hip; and colonoscopy.    Medications  He has a current medication list which includes the following prescription(s): aspirin, atorvastatin, blood glucose, blood glucose, blood glucose monitoring, catheters, dexcom g6 , dexcom g6 sensor, dexcom  g6 transmitter, furosemide, glucagon, insulin aspart, lisinopril, loratadine, multi vitamin w/d-3, order for dme, and tamsulosin.    Allergies  No Known Allergies    Social History  He  reports that he has never smoked. He has never used smokeless tobacco. He reports current alcohol use. He reports that he does not use drugs.  No drug abuse.    Family History  Family History   Problem Relation Age of Onset     Cancer Mother         ovarian     Prostate Cancer Father 65     Cancer Brother 50        had colon removed, ?colon cancer     Other - See Comments Son         lymphedema     Review of Systems  I personally reviewed the ROS with the patient.    Nursing Notes:   Kusum Boo LPN  6/2/2021  9:35 AM  Addendum  Pt presents to clinic for microhematuria consult    Review of Systems:    Weight loss:    No     Recent fever/chills:  No   Night sweats:   No  Current skin rash:  No   Recent hair loss:  No  Heat intolerance:  No   Cold intolerance:  No  Chest pain:   No   Palpitations:   No  Shortness of breath:  No   Wheezing:   No  Constipation:    No   Diarrhea:   No   Nausea:   No   Vomiting:   No   Kidney/side pain:  No   Back pain:   No  Frequent headaches:  No   Dizziness:     No  Leg swelling:   Yes   Calf pain:    No    Parents, brothers or sisters with history of kidney cancer:   No  Parents, brothers or sisters with history of bladder cancer: No  Father or brother with history of prostate cancer:  YES  FATHER      Physical Exam  Vitals:    06/02/21 0936 06/02/21 0942   BP: (!) 144/82 (!) 148/78   BP Location: Left arm    Patient Position: Sitting    Cuff Size: Adult Large    Pulse: 88    Resp: 18    Weight: 118.4 kg (261 lb)      Constitutional: No acute distress.  Alert and cooperative   Head: NCAT  Eyes: Conjunctivae normal  Cardiovascular: Regular rate.  Pulmonary/Chest: Respirations are even and non-labored bilaterally, no audible wheezing  Abdominal: Soft. No distension, tenderness, masses or  guarding.   Neurological: A + O x 3.  Cranial Nerves II-XII grossly intact.  Extremities: LAN x 4, Warm. No clubbing.  No cyanosis.    Skin: Pink, warm and dry.  No visible rashes noted.  Psychiatric:  Normal mood and affect  Back:  No left CVA tenderness.  No right CVA tenderness.  Genitourinary:  Nonpalpable bladder    Labs  Results for MAKENZIE GRAHAM (MRN [id number]) as of 6/2/2021 09:35   4/20/2021 08:39   Color Urine Yellow   Appearance Urine Clear   Glucose Urine Negative   Bilirubin Urine Negative   Ketones Urine Negative   Specific Gravity Urine 1.009   pH Urine 5.5   Protein Albumin Urine 100 (A)   Urobilinogen mg/dL Normal   Nitrite Urine Negative   Blood Urine Small (A)   Leukocyte Esterase Urine Large (A)   Source Midstream Urine   WBC Urine 28 (H)   RBC Urine 5 (H)   Hyaline Casts 1   Culture Micro Urine culture negative (no growth of uropathogens).   Specimen Description Midstream Urine     Imaging  None    Assessment  Mr. Graham is a 56 year old male w/h/o DM1 and CKD who presents with sterile microhematuria.    Discussed rationale for work up.  Discussed the specific indications for cross-sectional imaging as well as diagnostic cystoscopy.  Discussed potential findings of hematuria work up including, but not limited to, kidney stones, bladder tumors and/or kidney tumors.  Also discussed the potential for a normal work up.    Plan  Renal ultrasound with follow up for cystoscopy

## 2021-06-10 ENCOUNTER — OFFICE VISIT (OUTPATIENT)
Dept: UROLOGY | Facility: OTHER | Age: 56
End: 2021-06-10
Attending: UROLOGY
Payer: COMMERCIAL

## 2021-06-10 ENCOUNTER — HOSPITAL ENCOUNTER (OUTPATIENT)
Dept: ULTRASOUND IMAGING | Facility: OTHER | Age: 56
End: 2021-06-10
Attending: UROLOGY
Payer: COMMERCIAL

## 2021-06-10 VITALS
BODY MASS INDEX: 30.7 KG/M2 | TEMPERATURE: 97.7 F | OXYGEN SATURATION: 97 % | WEIGHT: 257.4 LBS | RESPIRATION RATE: 16 BRPM | HEART RATE: 103 BPM

## 2021-06-10 DIAGNOSIS — R31.29 MICROHEMATURIA: Primary | ICD-10-CM

## 2021-06-10 PROCEDURE — 76770 US EXAM ABDO BACK WALL COMP: CPT

## 2021-06-10 PROCEDURE — 52000 CYSTOURETHROSCOPY: CPT | Performed by: UROLOGY

## 2021-06-10 ASSESSMENT — PAIN SCALES - GENERAL: PAINLEVEL: NO PAIN (0)

## 2021-06-10 NOTE — PROGRESS NOTES
Preprocedure diagnosis  Hematuria    Postprocedure diagnosis  Hematuria    Procedure  Flexible Cystourethroscopy    Surgeon  Mega Aguilar MD    Anesthesia  2% lidocaine jelly intraurethrally    Complications  None    Indications  56 year old male undergoing a flexible cystoscopy for the above mentioned indications.    Findings  Cystoscopic findings included a normal anterior urethra.    There was not a prominent median lobe.    The lateral lobes were not obstructive in appearance.  The bladder appeared to be normal capacity.    There were no tumors, stones or foreign bodies.    The orifices were slit-shaped and in their normal location.    Procedure  The patient was placed in supine position and prepped and draped in sterile fashion with lidocaine jelly per urethra for anesthesia.    I passed a lubricated 14F flexible cystoscope through the penile urethra and into the bladder and the bladder was completely visualized.  The cystoscope was retroflexed and the bladder neck and prostate visualized.    The cystoscope was slowly withdrawn while visualizing the urethra and the procedure terminated.    The patient tolerated the procedure well.      Imaging  [facility]  6/10/2021  Report pending however no abnormalities seen    Assessment & Plan  Mr. Graham is a 56 year old male who underwent imaging and cystoscopy today to complete the hematuria work-up.  Following the cystoscopy I reviewed the imaging and we discussed the imaging results.  Provided reassurance given the negative work-up.

## 2021-06-10 NOTE — PATIENT INSTRUCTIONS

## 2021-06-10 NOTE — NURSING NOTE
Chief Complaint   Patient presents with     Procedure     cystoscopy hematuria    Patient positioned in supine position, perineum area prepped with chlorhexidene Gluconate and patient draped per sterile technique. Per verbal order read back by Mega Aguilar MD, Urojet 10mL 2% lidocaine jelly to be instilled into urethra.  Urojet- 10ml 2% Lidocaine jelly instilled into the urethra.    Urojet 2%  Lot#: [id number]  Expiration date: 12/22  : [employer]  NDC: [address]-2    [place] Protocol    A. Pre-procedure verification complete Yes  1-relevant information / documentation available, reviewed and properly matched to the patient; 2-consent accurate and complete, 3-equipment and supplies available    B. Site marking complete No  Site marked if not in continuous attendance with patient    C. TIME OUT completed Yes  Time Out was conducted just prior to starting procedure to verify the eight required elements: 1-patient identity, 2-consent accurate and complete, 3-position, 4-correct side/site marked (if applicable), 5-procedure, 6-relevant images / results properly labeled and displayed (if applicable), 7-antibiotics / irrigation fluids (if applicable), 8-safety precautions.    After procedure perineum area rinsed. Discharge instructions reviewed with patient. Patient verbalized understanding of discharge instructions and discharged ambulatory.  Randi Alonso LPN..................6/10/2021  8:24 AM    Medication Reconciliation: completed   Randi Alonso LPN  6/10/2021 8:24 AM

## 2021-06-16 DIAGNOSIS — E10.8 TYPE 1 DIABETES MELLITUS WITH UNSPECIFIED COMPLICATIONS (H): ICD-10-CM

## 2021-06-17 NOTE — TELEPHONE ENCOUNTER
Prescription approved per [facility] Refill Protocol.  LOV: 4/8/2021   Last labs: 2/18/2021  Belkys Campbell RN on 6/17/2021 at 10:34 AM

## 2021-06-19 ENCOUNTER — HEALTH MAINTENANCE LETTER (OUTPATIENT)
Age: 56
End: 2021-06-19

## 2021-07-13 ENCOUNTER — TRANSFERRED RECORDS (OUTPATIENT)
Dept: HEALTH INFORMATION MANAGEMENT | Facility: OTHER | Age: 56
End: 2021-07-13

## 2021-07-19 ENCOUNTER — OFFICE VISIT (OUTPATIENT)
Dept: PEDIATRICS | Facility: OTHER | Age: 56
End: 2021-07-19
Attending: INTERNAL MEDICINE
Payer: COMMERCIAL

## 2021-07-19 VITALS
TEMPERATURE: 96.5 F | DIASTOLIC BLOOD PRESSURE: 62 MMHG | HEART RATE: 93 BPM | SYSTOLIC BLOOD PRESSURE: 132 MMHG | BODY MASS INDEX: 30.24 KG/M2 | WEIGHT: 253.5 LBS | RESPIRATION RATE: 16 BRPM | OXYGEN SATURATION: 99 %

## 2021-07-19 DIAGNOSIS — I87.8 VENOUS STASIS OF BOTH LOWER EXTREMITIES: Primary | ICD-10-CM

## 2021-07-19 DIAGNOSIS — E10.21 DIABETIC NEPHROPATHY ASSOCIATED WITH TYPE 1 DIABETES MELLITUS (H): ICD-10-CM

## 2021-07-19 DIAGNOSIS — E10.65 UNCONTROLLED TYPE 1 DIABETES MELLITUS WITH HYPERGLYCEMIA (H): ICD-10-CM

## 2021-07-19 DIAGNOSIS — E78.2 MIXED HYPERLIPIDEMIA: ICD-10-CM

## 2021-07-19 LAB
ANION GAP SERPL CALCULATED.3IONS-SCNC: 5 MMOL/L (ref 3–14)
BUN SERPL-MCNC: 40 MG/DL (ref 7–25)
CALCIUM SERPL-MCNC: 9.4 MG/DL (ref 8.6–10.3)
CHLORIDE BLD-SCNC: 96 MMOL/L (ref 98–107)
CHOLEST SERPL-MCNC: 180 MG/DL
CO2 SERPL-SCNC: 30 MMOL/L (ref 21–31)
CREAT SERPL-MCNC: 2.02 MG/DL (ref 0.7–1.3)
FASTING STATUS PATIENT QL REPORTED: ABNORMAL
GFR SERPL CREATININE-BSD FRML MDRD: 36 ML/MIN/1.73M2
GLUCOSE BLD-MCNC: 485 MG/DL (ref 70–105)
HBA1C MFR BLD: 10.5 % (ref 4–6.2)
HDLC SERPL-MCNC: 46 MG/DL (ref 23–92)
HOLD SPECIMEN: NORMAL
LDLC SERPL CALC-MCNC: 94 MG/DL
NONHDLC SERPL-MCNC: 134 MG/DL
POTASSIUM BLD-SCNC: 4.4 MMOL/L (ref 3.5–5.1)
SODIUM SERPL-SCNC: 131 MMOL/L (ref 134–144)
TRIGL SERPL-MCNC: 198 MG/DL

## 2021-07-19 PROCEDURE — 99213 OFFICE O/P EST LOW 20 MIN: CPT | Performed by: INTERNAL MEDICINE

## 2021-07-19 PROCEDURE — 36415 COLL VENOUS BLD VENIPUNCTURE: CPT | Mod: ZL | Performed by: INTERNAL MEDICINE

## 2021-07-19 PROCEDURE — 80048 BASIC METABOLIC PNL TOTAL CA: CPT | Mod: ZL | Performed by: INTERNAL MEDICINE

## 2021-07-19 PROCEDURE — 80061 LIPID PANEL: CPT | Mod: ZL | Performed by: INTERNAL MEDICINE

## 2021-07-19 PROCEDURE — 83036 HEMOGLOBIN GLYCOSYLATED A1C: CPT | Mod: ZL | Performed by: INTERNAL MEDICINE

## 2021-07-19 RX ORDER — HYDRALAZINE HYDROCHLORIDE 50 MG/1
50 TABLET, FILM COATED ORAL 2 TIMES DAILY
COMMUNITY
Start: 2021-07-19 | End: 2022-03-08

## 2021-07-19 RX ORDER — FUROSEMIDE 20 MG
40 TABLET ORAL 2 TIMES DAILY
Qty: 90 TABLET | Refills: 3 | COMMUNITY
Start: 2021-07-19 | End: 2022-03-08

## 2021-07-19 ASSESSMENT — PAIN SCALES - GENERAL: PAINLEVEL: NO PAIN (0)

## 2021-07-19 NOTE — PROGRESS NOTES
"Subjective   Clemente Graham is a 56 year old male who presents for leg swelling.  His legs had been getting more swollen.  He was able to get in with Dr. Culver of nephrology.  Dr. Culver told him that his kidney function is decreasing.  He is attributing this to diabetes and hypertension.  We reviewed Dr. Culver's note together today.  Dr. Culver increased his furosemide dose and started hydralazine.  Since then his leg swelling has gone down.  He is not wearing compression stockings because they got a hole.  His blood sugars have been high.  He attributes this to lifestyle choices.  \"I need to do better.\"    Objective   Vitals: /62   Pulse 93   Temp (!) 96.5  F (35.8  C) (Tympanic)   Resp 16   Wt 115 kg (253 lb 8 oz)   SpO2 99%   BMI 30.24 kg/m        Review and Analysis of Data   I personally reviewed the following:  External notes: Yes I reviewed the note from Dr. Culver of nephrology  Results: Yes I reviewed most recent BMP, A1c, lipid panel and previous 24-hour protein level.  Use of an independent historian: No  Independent review of a test performed by another physician: No  Discussion of management with another physician: No  High risk of morbidity from additional diagnostic testing and/or treatment.    Assessment & Plan   1. Venous stasis of both lower extremities  His venous stasis is likely multifactorial including venous insufficiency, uncontrolled hypertension, nephrotic syndrome associated with diabetic nephropathy, dietary indiscretions, others.  No changes are made today.  Home blood pressure monitoring is recommended.  Notify Dr. Culver or myself with significant changes.  - furosemide (LASIX) 20 MG tablet; Take 2 tablets (40 mg) by mouth 2 times daily  Dispense: 90 tablet; Refill: 3    2. Uncontrolled type 1 diabetes mellitus with hyperglycemia (H)  We had a lengthy discussion today with regards to diabetes mellitus type 1.  I think it be reasonable to go back to the 4 shot " method since he has been uncontrolled despite the use of an insulin pump.  He would like to continue with the pump and follow closely with [facility] of diabetes education.  Discussed the possibility of endocrinology consultation and decided against it at this time.  - Basic metabolic panel; Future  - Hemoglobin A1c; Future  - [facility] Adult Diabetes Educator Referral; Future    3. Diabetic nephropathy associated with type 1 diabetes mellitus (H)  - Basic metabolic panel; Future    4. Mixed hyperlipidemia  - Lipid Profile; Future    Signed, Tello Pro MD, FAAP, FACP  Internal Medicine & Pediatrics

## 2021-07-19 NOTE — NURSING NOTE
Chief Complaint   Patient presents with     Leg Swelling     FOOD SECURITY SCREENING QUESTIONS  Hunger Vital Signs:  Within the past 12 months we worried whether our food would run out before we got money to buy more. Never  Within the past 12 months the food we bought just didn't last and we didn't have money to get more. Never      Patient presents today with some leg swelling bilaterally. He states this is an ongoing issue and he is just following up with the Dr.. No other questions or concerns at this time.           Medication Reconciliation: complete    Kailee Rush LPN

## 2021-07-25 ENCOUNTER — TRANSFERRED RECORDS (OUTPATIENT)
Dept: HEALTH INFORMATION MANAGEMENT | Facility: OTHER | Age: 56
End: 2021-07-25

## 2021-08-23 ENCOUNTER — OFFICE VISIT (OUTPATIENT)
Dept: INTERNAL MEDICINE | Facility: OTHER | Age: 56
End: 2021-08-23
Attending: NURSE PRACTITIONER
Payer: COMMERCIAL

## 2021-08-23 ENCOUNTER — ALLIED HEALTH/NURSE VISIT (OUTPATIENT)
Dept: EDUCATION SERVICES | Facility: OTHER | Age: 56
End: 2021-08-23
Attending: INTERNAL MEDICINE
Payer: COMMERCIAL

## 2021-08-23 VITALS
TEMPERATURE: 98.5 F | OXYGEN SATURATION: 99 % | SYSTOLIC BLOOD PRESSURE: 128 MMHG | HEART RATE: 91 BPM | RESPIRATION RATE: 16 BRPM | BODY MASS INDEX: 28.3 KG/M2 | DIASTOLIC BLOOD PRESSURE: 68 MMHG | WEIGHT: 237.2 LBS

## 2021-08-23 DIAGNOSIS — E11.628 DIABETIC FOOT INFECTION (H): ICD-10-CM

## 2021-08-23 DIAGNOSIS — E10.65 UNCONTROLLED TYPE 1 DIABETES MELLITUS WITH HYPERGLYCEMIA (H): Primary | ICD-10-CM

## 2021-08-23 DIAGNOSIS — I87.8 VENOUS STASIS OF BOTH LOWER EXTREMITIES: ICD-10-CM

## 2021-08-23 DIAGNOSIS — L08.9 DIABETIC FOOT INFECTION (H): ICD-10-CM

## 2021-08-23 DIAGNOSIS — S91.301A NON-HEALING OPEN WOUND OF RIGHT HEEL: Primary | ICD-10-CM

## 2021-08-23 PROCEDURE — 99213 OFFICE O/P EST LOW 20 MIN: CPT | Performed by: NURSE PRACTITIONER

## 2021-08-23 PROCEDURE — G0108 DIAB MANAGE TRN  PER INDIV: HCPCS

## 2021-08-23 RX ORDER — SULFAMETHOXAZOLE/TRIMETHOPRIM 800-160 MG
1 TABLET ORAL 2 TIMES DAILY
Qty: 14 TABLET | Refills: 0 | Status: SHIPPED | OUTPATIENT
Start: 2021-08-23 | End: 2021-08-30

## 2021-08-23 RX ORDER — ERGOCALCIFEROL 1.25 MG/1
50000 CAPSULE ORAL
COMMUNITY
Start: 2021-08-10 | End: 2022-03-08

## 2021-08-23 ASSESSMENT — PAIN SCALES - GENERAL: PAINLEVEL: NO PAIN (0)

## 2021-08-23 NOTE — NURSING NOTE
"Chief Complaint   Patient presents with     Musculoskeletal Problem     Blister on right heel of foot     Patient presentswith blister on heel of right foot that has been present since about Saturday evening.  Previous A1C is not at goal of <8  Lab Results   Component Value Date    A1C 10.5 07/19/2021    A1C 10.3 02/18/2021    A1C 13.6 12/14/2020    A1C 7.2 05/21/2020     Urine microalbumin:creatine: 7/17/21  Foot exam : 4/20/2021  Eye exam : 4/2021  Tobacco User no  Patient is on a daily aspirin  Patient is on a Statin.  Blood pressure today of:     BP Readings from Last 1 Encounters:   08/23/21 128/68      is at the goal of <139/89 for diabetics.    Jadyn Pope LPN on 8/23/2021 at 2:34 PM      Initial /68 (BP Location: Right arm, Patient Position: Sitting, Cuff Size: Adult Regular)   Pulse 91   Temp 98.5  F (36.9  C) (Tympanic)   Resp 16   Wt 107.6 kg (237 lb 3.2 oz)   SpO2 99%   BMI 28.30 kg/m   Estimated body mass index is 28.3 kg/m  as calculated from the following:    Height as of 4/8/21: 1.95 m (6' 4.77\").    Weight as of this encounter: 107.6 kg (237 lb 3.2 oz).  Medication Reconciliation: complete  FOOD SECURITY SCREENING QUESTIONS  Hunger Vital Signs:  Within the past 12 months we worried whether our food would run out before we got money to buy more. Never  Within the past 12 months the food we bought just didn't last and we didn't have money to get more. Never    Advance care plan reviewed      Jadyn Pope LPN    "

## 2021-08-23 NOTE — PROGRESS NOTES
Diabetes Education Progress Note  Continuous Glucose Monitoring System (CGMS)     SUBJECTIVE: Juan is a 56 y.o. male who has type 1 diabetes and is here for Personal Continuous Glucose Monitoring System (CGMS) restart and training review.      OBJECTIVE:  Testing frequency: 0 up to 4 times a day      Current Diabetes Management per Patient:  Diabetes medications   yes:     Diabetes Medication(s)     Diabetic Other       glucagon 1 MG kit    Inject 1 mg into the muscle as needed    Insulin       insulin aspart (NOVOLOG VIAL) 100 UNITS/ML vial    USE AS DIRECTED MAX DAILY DOSE 70 UNITS        Insulin Pump Type: Tandem TSlim X2 insulin pump with Control IQ Technology      ASSESSMENT: Juan was instructed in features of DEXCOM G6 CGMS.  Reviewed instructions in programming CGM into pump and cell phone device.  He will wear the sensor for 10 days and if he would like, can download at home using Dexcom Clarity software found at www.[email or web address].com.       Patient demonstrated understanding by inserting his own sensor.  Sensor inserted without difficulty,  receiving data.  Helped patient program his pump and cell phone as .      Patient elected to share data from pump and cgm with [facility].  Apps uploaded:  t:connect.  Apps reopened:  Dexcom G6 mobile and Clarity.      Set up will be completed by patient after new pump uploads to Tandem website.         PLAN:  1.  Reviewed Dexcom G6 CGM system and connectivity (to pump and cell phone).    2.  Begin to enter CHO grams into pump for bolus advice.    3.  Begin to change infusion set once every 3 days for best insulin absorption and improved glucose control.    4.  Control IQ will use current CGM data for correction of high BG and adjust basal to target range 112.5 - 160 mg/dL.    5.  Complete Tandem fernando questions on cell phone device to upload pump per bluetooth to your phone and please call [facility] for pump and cgm review and management to help gain tighter  control of T1DM.     Time spent with patient was 60 minutes.     See patient instructions for complete plan.     Bindu Spring RN, BSN, [facility]  8/23/2021 4:41 PM

## 2021-08-24 ASSESSMENT — ENCOUNTER SYMPTOMS
NUMBNESS: 1
ENDOCRINE COMMENTS: DIABETIC
WOUND: 1

## 2021-08-24 NOTE — PROGRESS NOTES
"Clemente Graham  : 1965 Age: 56 year old Sex: male MRN: [id number]    CC:   Chief Complaint   Patient presents with     Musculoskeletal Problem     Blister on right heel of foot       MELIDA Score:    No flowsheet data found.    PHQ-2 Score:     PHQ-2 (  [employer]) 2021   Q1: Little interest or pleasure in doing things 0 0   Q2: Feeling down, depressed or hopeless 0 0   PHQ-2 Score 0 0            Tobacco Use      Smoking status: Never Smoker      Smokeless tobacco: Never Used      Tobacco comment: no ecig      NURSE'S NOTES:    Nursing Notes:   Jadyn Pope LPN  2021  2:58 PM  Signed  Chief Complaint   Patient presents with     Musculoskeletal Problem     Blister on right heel of foot     Patient presentswith blister on heel of right foot that has been present since about Saturday evening.  Previous A1C is not at goal of <8  Lab Results   Component Value Date    A1C 10.5 2021    A1C 10.3 2021    A1C 13.6 2020    A1C 7.2 2020     Urine microalbumin:creatine: 21  Foot exam : 2021  Eye exam : 2021  Tobacco User no  Patient is on a daily aspirin  Patient is on a Statin.  Blood pressure today of:     BP Readings from Last 1 Encounters:   21 128/68      is at the goal of <139/89 for diabetics.    Jadyn Pope LPN on 2021 at 2:34 PM      Initial /68 (BP Location: Right arm, Patient Position: Sitting, Cuff Size: Adult Regular)   Pulse 91   Temp 98.5  F (36.9  C) (Tympanic)   Resp 16   Wt 107.6 kg (237 lb 3.2 oz)   SpO2 99%   BMI 28.30 kg/m   Estimated body mass index is 28.3 kg/m  as calculated from the following:    Height as of 21: 1.95 m (6' 4.77\").    Weight as of this encounter: 107.6 kg (237 lb 3.2 oz).  Medication Reconciliation: complete  FOOD SECURITY SCREENING QUESTIONS  Hunger Vital Signs:  Within the past 12 months we worried whether our food would run out before we got money to buy more. Never  Within the past 12 months " the food we bought just didn't last and we didn't have money to get more. Never    Advance care plan reviewed      Jadyn Pope LPN         Nursing note reviewed with patient.  Accuracy and completeness verified.      SUBJECTIVE:                                                      HPI:   Clemente Graham presents to the clinic today with complaints of a blister on his right heel.  He is diabetic.  He has had a similar wound on the same foot in the past which was managed by Chast[facility] wound care NP. he reports no new shoes, socks or injury to the area.  He has been trying to manage it at home and figured he should get it looked at.    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   Neurological: Positive for numbness (Feet).   All other systems reviewed and are negative.      Problem List/PMH: Reviewed in EMR, and made relevant updates today.  Medications: Reviewed in EMR, and made relevant updates today.  Allergies: Reviewed in EMR, and made relevant updates today.    OBJECTIVE:                                                      /68 (BP Location: Right arm, Patient Position: Sitting, Cuff Size: Adult Regular)   Pulse 91   Temp 98.5  F (36.9  C) (Tympanic)   Resp 16   Wt 107.6 kg (237 lb 3.2 oz)   SpO2 99%   BMI 28.30 kg/m      Current Pain Score:    No Pain (0)     Physical Exam  Vitals and nursing note reviewed.   Constitutional:       Appearance: Normal appearance.   HENT:      Head: Normocephalic and atraumatic.   Eyes:      Extraocular Movements: Extraocular movements intact.      Conjunctiva/sclera: Conjunctivae normal.   Musculoskeletal:         General: Normal range of motion.   Skin:     General: Skin is cool.      Findings: Wound present.             Comments: See image   Neurological:      Mental Status: He is alert and oriented to person, place, and time. Mental status is at baseline.   Psychiatric:         Mood and Affect: Mood normal.         Behavior: Behavior normal.         Thought Content: Thought content normal.         Judgment: Judgment normal.               BP Readings from Last 3 Encounters:   08/23/21 128/68   07/19/21 132/62   06/02/21 (!) 148/78        Vitals:    08/23/21 1431   Weight: 107.6 kg (237 lb 3.2 oz)      The 10-year ASCVD risk score (Schuyler WALDROP Jr., et al., 2013) is: 12.8%    Values used to calculate the score:      Age: 56 years      Sex: Male      Is Non- : No      Diabetic: Yes      Tobacco smoker: No      Systolic Blood Pressure: 128 mmHg      Is BP treated: Yes      HDL Cholesterol: 46 mg/dL      Total Cholesterol: 180 mg/dL    Diagnostics Completed at this Visit:    No results found for any visits on 08/23/21.     ASSESSMENT AND PLAN:    Non-healing open wound of right heel  Venous stasis of both lower extremities  Hx diabetic foot infection  - sulfamethoxazole-trimethoprim (BACTRIM DS) 800-160 MG tablet  Dispense: 14 tablet; Refill: 0  - Wound Care Referral  We will start him on antibiotics prophylactically as he had a foot infection develop from a prior wound similar to this.  I recommend he follow-up with Chastity wound care NP.  Skin was trimmed off today as it was crusty and dead, wound bed clean gently with gauze and wound wash.  Hydrogel applied and covered with Allevyn gentle border  dressing.  Patient instructed to leave dressing on up to 7 days.  An additional dressing was given to him in case he is unable to get an appointment with wound care.  We can also see him here in the clinic if her schedule is booked out.  Patient advised to elevate foot is much as possible keep bandage clean and dry.  Monitor for signs and symptoms of worsening such as red streaks, fever, pus-like drainage, and increased pain.    I explained my diagnostic considerations and recommendations to the patient, who voiced understanding and agreement with the treatment plan. All questions were answered. We discussed potential side effects of any prescribed or recommended therapies, as well as expectations for response to treatments.    Patient was advised to allow up to 2 days for response on lab results via MyChart and or letter to be sent.    FOLLOW-UP:    Return in about 1 week (around 8/30/2021) for Recheck of right heel wound.     Clinic : [phone]  Appointment line: [phone]     DARA Denny, AGNP-C  Internal Medicine  08/24/2021 1:37 PM  _____________________________________________________________________________________    Total time spent with this patient was 25 minutes which included chart review, visualization and interpretation of labs and/or images, time spent with patient, and documentation.

## 2021-08-25 NOTE — PATIENT INSTRUCTIONS
Continuous Glucose Monitoring Personal Start      Congratulations! You have decided again to utilize the DEXCOM G6 Continuous Glucose Monitoring (CGM) System (G6). We strongly believe in the use of the new technologies to better assist you in managing your diabetes. These technologies give both you and your provider a better understanding of your diabetes and current trends, which will enable you to improve control of your blood glucose levels.      Low and high alerts are set 70 mg/dL and 250 mg/dL. You inserted your sensor today at 3:30 pm.  You should begin receiving sensor glucose readings in 2 hours after insertion.  Your sensor lasts 10 days and you will be prompted when the sensor is close to expiration and a new sensor can then be started.    [facility] is a team of trainers and certified  diabetes educators dedicated to helping you with  your new G6. They provide live, interactive support  and webinars to get you started and keep you  informed.     You may download your CGM information from your  at [email or web address].  Choose the Dexcom Clarity software.     Dexcom CLARITY software is an important part of your G6.  Clarity highlights your glucose patterns, trends and statistics. Share  with your clinic and monitor improvements between visits.    To use Dexcom CLARITY:    1. Log in at [email or web address] or use Clarity florentin on your phone.  Use your Dexcom G6 login .    2. Get weekly notifications with Dexcom CLARITY Reports Florentin.  Notifications available to Dexcom SoundTag users.    3.  Dexcom Technical Support is available 24 hours a day/7 days a week at:        [email or web address]/support    [email or web address]    Toll free: [phone]         PLAN:      1.  Reviewed Dexcom G6 CGM system and connectivity (to pump and cell phone).  No need for calibrations if, at the time of insertion, you enter the sensor code into your florentin or .  If you do not have a code or choose not to enter the code,  you will need to calibrate the G6 fernando/.  Follow calibration instructions on your fernando or pump for sensor period (10 days).     2.  Begin to enter carbohydrate grams into pump for bolus advice.    3.  Begin to change infusion set once every 3 days for best insulin absorption and improved glucose control.    4.  Control IQ will use current CGM data for correction of high BG and adjust basal to target range 112.5 - 160 mg/dL.    5.  Complete Tandem fernando questions on cell phone device to upload pump per bluetooth to your phone and please call [facility] for pump and cgm review and management to help gain tighter control of T1DM.    Bindu Spring RN, BSN, [facility]  8/23/2021 4:15 PM

## 2021-08-27 ENCOUNTER — OFFICE VISIT (OUTPATIENT)
Dept: INTERNAL MEDICINE | Facility: OTHER | Age: 56
End: 2021-08-27
Attending: NURSE PRACTITIONER
Payer: COMMERCIAL

## 2021-08-27 VITALS
SYSTOLIC BLOOD PRESSURE: 138 MMHG | WEIGHT: 249.8 LBS | BODY MASS INDEX: 29.8 KG/M2 | DIASTOLIC BLOOD PRESSURE: 70 MMHG | TEMPERATURE: 97.9 F | OXYGEN SATURATION: 99 % | RESPIRATION RATE: 14 BRPM | HEART RATE: 98 BPM

## 2021-08-27 DIAGNOSIS — S91.301A NON-HEALING OPEN WOUND OF RIGHT HEEL: Primary | ICD-10-CM

## 2021-08-27 DIAGNOSIS — E11.628 DIABETIC FOOT INFECTION (H): ICD-10-CM

## 2021-08-27 DIAGNOSIS — N18.32 TYPE 1 DIABETES MELLITUS WITH STAGE 3B CHRONIC KIDNEY DISEASE (H): ICD-10-CM

## 2021-08-27 DIAGNOSIS — L97.412 DIABETIC ULCER OF RIGHT HEEL ASSOCIATED WITH TYPE 1 DIABETES MELLITUS, WITH FAT LAYER EXPOSED (H): ICD-10-CM

## 2021-08-27 DIAGNOSIS — E10.621 DIABETIC ULCER OF RIGHT HEEL ASSOCIATED WITH TYPE 1 DIABETES MELLITUS, WITH FAT LAYER EXPOSED (H): ICD-10-CM

## 2021-08-27 DIAGNOSIS — I87.8 VENOUS STASIS OF BOTH LOWER EXTREMITIES: ICD-10-CM

## 2021-08-27 DIAGNOSIS — E10.22 TYPE 1 DIABETES MELLITUS WITH STAGE 3B CHRONIC KIDNEY DISEASE (H): ICD-10-CM

## 2021-08-27 DIAGNOSIS — L08.9 DIABETIC FOOT INFECTION (H): ICD-10-CM

## 2021-08-27 PROCEDURE — 99213 OFFICE O/P EST LOW 20 MIN: CPT | Performed by: NURSE PRACTITIONER

## 2021-08-27 ASSESSMENT — ENCOUNTER SYMPTOMS
ENDOCRINE COMMENTS: DIABETIC
WOUND: 1
NUMBNESS: 1

## 2021-08-27 ASSESSMENT — PAIN SCALES - GENERAL: PAINLEVEL: NO PAIN (0)

## 2021-08-27 NOTE — PROGRESS NOTES
"Clemente Graham  : 1965 Age: 56 year old Sex: male MRN: [id number]    CC:   Chief Complaint   Patient presents with     RECHECK     Wound care;right heel     MELIDA Score:    No flowsheet data found.    PHQ-2 Score:     PHQ-2 (  [employer]) 2021   Q1: Little interest or pleasure in doing things 0 0   Q2: Feeling down, depressed or hopeless 0 0   PHQ-2 Score 0 0            Tobacco Use      Smoking status: Never Smoker      Smokeless tobacco: Never Used      Tobacco comment: no ecig      NURSE'S NOTES:    Nursing Notes:   Jadyn Pope LPN  2021  9:19 AM  Signed  Chief Complaint   Patient presents with     RECHECK     Wound care;right heel       Initial /70 (BP Location: Right arm, Patient Position: Sitting, Cuff Size: Adult Regular)   Pulse 98   Temp 97.9  F (36.6  C) (Tympanic)   Resp 14   Wt 113.3 kg (249 lb 12.8 oz)   SpO2 99%   BMI 29.80 kg/m   Estimated body mass index is 29.8 kg/m  as calculated from the following:    Height as of 21: 1.95 m (6' 4.77\").    Weight as of this encounter: 113.3 kg (249 lb 12.8 oz).  Medication Reconciliation: complete  FOOD SECURITY SCREENING QUESTIONS  Hunger Vital Signs:  Within the past 12 months we worried whether our food would run out before we got money to buy more. Never  Within the past 12 months the food we bought just didn't last and we didn't have money to get more. Never    Advance care plan reviewed      Jadyn Pope LPN         Nursing note reviewed with patient.  Accuracy and completeness verified.      SUBJECTIVE:                                                      HPI:   Clemente Graham presents to the clinic today for follow-up of a wound on his right heel.  He is diabetic.  He has had a similar wound on the same foot in the past which was managed by Chastity wound care NP.     He was seen initially on 2021, dead dry skin was removed and wound was dressed with hydrogel and an Allevyn dressing.  He was encouraged to " follow-up if needed in clinic and schedule an appointment with wound care NP.  He is going to be seeing her on September 10th.  He needs additional supplies to get him through to that appointment.    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   Neurological: Positive for numbness (Feet).   All other systems reviewed and are negative.      Problem List/PMH: Reviewed in EMR, and made relevant updates today.  Medications: Reviewed in EMR, and made relevant updates today.  Allergies: Reviewed in EMR, and made relevant updates today.    OBJECTIVE:                                                      /70 (BP Location: Right arm, Patient Position: Sitting, Cuff Size: Adult Regular)   Pulse 98   Temp 97.9  F (36.6  C) (Tympanic)   Resp 14   Wt 113.3 kg (249 lb 12.8 oz)   SpO2 99%   BMI 29.80 kg/m      Current Pain Score:   No Pain (0)     Physical Exam  Vitals and nursing note reviewed.   Constitutional:       Appearance: Normal appearance.   HENT:      Head: Normocephalic and atraumatic.   Eyes:      Extraocular Movements: Extraocular movements intact.      Conjunctiva/sclera: Conjunctivae normal.   Musculoskeletal:         General: Normal range of motion.   Skin:     General: Skin is cool.      Findings: Wound present.             Comments: See  image   Neurological:      Mental Status: He is alert and oriented to person, place, and time. Mental status is at baseline.   Psychiatric:         Mood and Affect: Mood normal.         Behavior: Behavior normal.         Thought Content: Thought content normal.         Judgment: Judgment normal.        Initial Visit:        Today's Visit:      BP Readings from Last 3 Encounters:   08/23/21 128/68   07/19/21 132/62   06/02/21 (!) 148/78        Vitals:    08/27/21 0904   Weight: 113.3 kg (249 lb 12.8 oz)      The 10-year ASCVD risk score (Schuyler WALDROP Jr., et al., 2013) is: 12.8%    Values used to calculate the score:      Age: 56 years      Sex: Male      Is Non- : No      Diabetic: Yes      Tobacco smoker: No      Systolic Blood Pressure: 128 mmHg      Is BP treated: Yes      HDL Cholesterol: 46 mg/dL      Total Cholesterol: 180 mg/dL    Diagnostics Completed at this Visit:    No results found for any visits on 08/27/21.     ASSESSMENT AND PLAN:    Non-healing open wound of right heel  Venous stasis of both lower extremities  Hx diabetic foot infection  He reports he is changing bandage daily as he is having difficulty getting it to stay on.  He has not been using any topical ointment, only the Allevyn gentle border light dressing.  Dressing change and wound care completed in clinic today.  We will send him home with 2 additional dressings and a tube of Thera honey for him to use at home.  Prescription sent to [facility] [place] for supplies to be used at home.  Did instruct him that once he sees Chastity wound care NP, she may change treatment plan and he may need additional supplies.  He states his understanding.  He will see her on the 10th and continue to manage wound at home.  He will notify us if he has any signs of infection or worsening.    Continue on antibiotics prophylactically as he had a foot infection develop from a prior wound similar to this.    Patient advised to elevate foot is much  as possible keep bandage clean and dry.  Monitor for signs and symptoms of worsening such as red streaks, fever, pus-like drainage, and increased pain.    I explained my diagnostic considerations and recommendations to the patient, who voiced understanding and agreement with the treatment plan. All questions were answered. We discussed potential side effects of any prescribed or recommended therapies, as well as expectations for response to treatments.    Patient was advised to allow up to 2 days for response on lab results via MyChart and or letter to be sent.    FOLLOW-UP:    Return for Keep Scheduled Appointment Already Made with Chastity Wound Care NP on 9/10/21.     Clinic : [phone]  Appointment line: [phone]     DARA Denny, AGNP-C  Internal Medicine  08/27/2021 10:32 AM  _____________________________________________________________________________________    Total time spent with this patient was 23 minutes which included chart review, visualization and interpretation of labs and/or images, time spent with patient, and documentation.

## 2021-08-27 NOTE — NURSING NOTE
"Chief Complaint   Patient presents with     RECHECK     Wound care;right heel       Initial /70 (BP Location: Right arm, Patient Position: Sitting, Cuff Size: Adult Regular)   Pulse 98   Temp 97.9  F (36.6  C) (Tympanic)   Resp 14   Wt 113.3 kg (249 lb 12.8 oz)   SpO2 99%   BMI 29.80 kg/m   Estimated body mass index is 29.8 kg/m  as calculated from the following:    Height as of 4/8/21: 1.95 m (6' 4.77\").    Weight as of this encounter: 113.3 kg (249 lb 12.8 oz).  Medication Reconciliation: complete  FOOD SECURITY SCREENING QUESTIONS  Hunger Vital Signs:  Within the past 12 months we worried whether our food would run out before we got money to buy more. Never  Within the past 12 months the food we bought just didn't last and we didn't have money to get more. Never    Advance care plan reviewed      Jadyn Pope LPN    "

## 2021-09-10 ENCOUNTER — OFFICE VISIT (OUTPATIENT)
Dept: INTERNAL MEDICINE | Facility: OTHER | Age: 56
End: 2021-09-10
Attending: NURSE PRACTITIONER
Payer: COMMERCIAL

## 2021-09-10 VITALS
HEART RATE: 89 BPM | RESPIRATION RATE: 16 BRPM | TEMPERATURE: 96.9 F | WEIGHT: 263.9 LBS | DIASTOLIC BLOOD PRESSURE: 80 MMHG | OXYGEN SATURATION: 99 % | BODY MASS INDEX: 31.48 KG/M2 | SYSTOLIC BLOOD PRESSURE: 136 MMHG

## 2021-09-10 DIAGNOSIS — I87.8 VENOUS STASIS OF BOTH LOWER EXTREMITIES: ICD-10-CM

## 2021-09-10 DIAGNOSIS — E11.628 DIABETIC FOOT INFECTION (H): ICD-10-CM

## 2021-09-10 DIAGNOSIS — L97.412 DIABETIC ULCER OF RIGHT HEEL ASSOCIATED WITH TYPE 1 DIABETES MELLITUS, WITH FAT LAYER EXPOSED (H): Primary | ICD-10-CM

## 2021-09-10 DIAGNOSIS — E11.42 DIABETIC PERIPHERAL NEUROPATHY (H): ICD-10-CM

## 2021-09-10 DIAGNOSIS — N18.4 CHRONIC KIDNEY DISEASE, STAGE IV (SEVERE) (H): ICD-10-CM

## 2021-09-10 DIAGNOSIS — E10.621 DIABETIC ULCER OF RIGHT HEEL ASSOCIATED WITH TYPE 1 DIABETES MELLITUS, WITH FAT LAYER EXPOSED (H): Primary | ICD-10-CM

## 2021-09-10 DIAGNOSIS — L08.9 DIABETIC FOOT INFECTION (H): ICD-10-CM

## 2021-09-10 PROBLEM — N18.30 CHRONIC KIDNEY DISEASE, STAGE III (MODERATE) (H): Status: RESOLVED | Noted: 2020-08-11 | Resolved: 2021-09-10

## 2021-09-10 PROCEDURE — 99213 OFFICE O/P EST LOW 20 MIN: CPT | Performed by: NURSE PRACTITIONER

## 2021-09-10 ASSESSMENT — ENCOUNTER SYMPTOMS
WOUND: 1
FEVER: 0

## 2021-09-10 ASSESSMENT — PAIN SCALES - GENERAL: PAINLEVEL: NO PAIN (0)

## 2021-09-10 NOTE — PROGRESS NOTES
ASSESSMENT:    ICD-10-CM    1. Diabetic ulcer of right heel associated with type 1 diabetes mellitus, with fat layer exposed (H)  E10.621     L97.412    2. Venous stasis of both lower extremities  I87.8 Wound Care Referral   3. Hx diabetic foot infection  E11.628 Wound Care Referral    L08.9    4. Diabetic peripheral neuropathy (H)  E11.42    5. Chronic kidney disease, stage IV (severe) (H)  N18.4        PLAN:  Wear compression stockings daily and diabetic shoes.  Continue cleansing wound with soap and water, rinse clear and pat dry.  Apply pea-sized amount of medihony to wound bed then cover with Allevyn and secure with medipore tape.  Reduce dressing change to Monday, Wednesday and Friday.  Follow up in 1 week.  Will see you again next Friday.  Also discussed that he needs to obtain better control of his diabetes.  From his reported blood sugars it sounds as though control is improving.  Also continue to follow with nephrology for diabetic nephrotic kidney disease.    SUBJECTIVE:    He is here today for wound evaluation.  He developed a new wound several weeks ago.  This is located at the right heel.  He has work boots that does have orthotics and have been evaluated by orthotist.  Today he is wearing shoes that do not have custom fitted orthotics.  He is not wearing his compression stockings.  He was seen in clinic twice at the end of August.  This was for his wound.  He was initially treated with empiric antibiotics with Bactrim for a week.  He finished that course.  It was recommended by provider at that time that he apply Medihoney and Allevyn border dressings and change daily.  He has been doing this.  He did receive a shipment to his home.  He also purchased some online.  He states that his son had a honey infused gauze that he had been using for previous wound so instead of using the wound gel he started using the gauze.  He lasted his dressing change on Wednesday.  He is only able to elevate his legs  when he is not working.  He is followed by nephrologist due to nephrotic kidney disease from poorly controlled diabetes.  He tells me that his blood sugars range from 100-2 10.  His last A1c was 10.5% in July.  He was seen by diabetic educator about 3 weeks ago he believes.  He is also on diuretics and had changes in hypertensive meds by his nephrologist.    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)     Benign essential hypertension     Urinary retention     Insulin pump in place     Mixed hyperlipidemia     Type 1 diabetes mellitus with stage 3 chronic kidney disease (H)     Diabetic peripheral neuropathy (H)     Lower limb amputation, great toe, right (H)     Hx diabetic foot infection     Allergic conjunctivitis, bilateral     Moderate protein-calorie malnutrition (H)     Chronic kidney disease, stage IV (severe) (H)     PAST MEDICAL HISTORY:  Past Medical History:   Diagnosis Date     Carpal tunnel syndrome, bilateral      Diabetic foot ulcer (H)      Hyperlipidemia      Hypertension      Microalbuminuria      Neuropathy      Retinopathy      Trigger finger of both hands      Type 1 diabetes (H) 1982     Ulnar nerve entrapment at elbow, left      Urinary retention      SURGICAL HISTORY:  Past Surgical History:   Procedure Laterality Date     AMPUTATION Right     Great toe     CARPAL TUNNEL RELEASE RT/LT Bilateral      CLOSED REDUCTION, PERCUTANEOUS PINNING HIP      age 14, pinned for dislocation     COLONOSCOPY      Due 2022. has had two, history of polyps. recommend 5 year follow-up     ROTATOR CUFF REPAIR RT/LT Bilateral        SOCIAL HISTORY:  Social History     Socioeconomic History     Marital status:      Spouse name: Not on file     Number of children: Not on file     Years of education: Not on file     Highest education level: Not on file   Occupational History     Not on file   Tobacco Use     Smoking status: Never Smoker      Smokeless tobacco: Never Used     Tobacco comment: no ecig   Vaping Use     Vaping Use: Never used   Substance and Sexual Activity     Alcohol use: Yes     Comment: little     Drug use: Never     Sexual activity: Not Currently   Other Topics Concern     Not on file   Social History Narrative    Works at [employer] as a [employer]     - wife Jeanette    1 son- Will     Social Determinants of Health     Financial Resource Strain:      Difficulty of Paying Living Expenses:    Food Insecurity:      Worried About Running Out of Food in the Last Year:      Ran Out of Food in the Last Year:    Transportation Needs:      Lack of Transportation (Medical):      Lack of Transportation (Non-Medical):    Physical Activity:      Days of Exercise per Week:      Minutes of Exercise per Session:    Stress:      Feeling of Stress :    Social Connections:      Frequency of Communication with Friends and Family:      Frequency of Social Gatherings with Friends and Family:      Attends Restorationism Services:      Active Member of Clubs or Organizations:      Attends Club or Organization Meetings:      Marital Status:    Intimate Partner Violence:      Fear of Current or Ex-Partner:      Emotionally Abused:      Physically Abused:      Sexually Abused:      FAMILYHISTORY:  Family History   Problem Relation Age of Onset     Cancer Mother         ovarian     Prostate Cancer Father 65     Cancer Brother 50        had colon removed, ?colon cancer     Other - See Comments Son         lymphedema     CURRENT MEDICATIONS:   Current Outpatient Medications   Medication Sig Dispense Refill     aspirin (ASA) 81 MG tablet Take 1 tablet by mouth daily       atorvastatin (LIPITOR) 40 MG tablet Take 1 tablet (40 mg) by mouth daily 90 tablet 3     blood glucose (NO BRAND SPECIFIED) lancets standard Dispense item covered by insurance. Test blood sugar 6 times daily. 100 each 11     blood glucose (NO BRAND SPECIFIED) test strip  Dispense item covered by insurance. Test blood sugar 6 times daily. 100 strip 11     blood glucose monitoring (NO BRAND SPECIFIED) meter device kit Dispense option covered by insurance. Test blood sugar 6 times daily. 1 kit 11     Catheters [facility] Bard Magic3 14F ref #[id number] - use to self-catheterize 4 times daily.       Continuous Blood Gluc  (DEXCOM G6 ) RONALD        Continuous Blood Gluc Sensor (DEXCOM G6 SENSOR) MISC every 30 days       Continuous Blood Gluc Transmit (DEXCOM G6 TRANSMITTER) MISC        ergocalciferol (ERGOCALCIFEROL) 1.25 MG (52459 UT) capsule Take 50,000 Units by mouth       furosemide (LASIX) 20 MG tablet Take 2 tablets (40 mg) by mouth 2 times daily 90 tablet 3     glucagon 1 MG kit Inject 1 mg into the muscle as needed       hydrALAZINE (APRESOLINE) 50 MG tablet Take 1 tablet (50 mg) by mouth 2 times daily       HYDRALAZINE-HCTZ PO Take 1 tablet by mouth 2 times daily       insulin aspart (NOVOLOG VIAL) 100 UNITS/ML vial USE AS DIRECTED MAX DAILY DOSE 70 UNITS 60 mL 0     lisinopril (ZESTRIL) 40 MG tablet Take 1 tablet (40 mg) by mouth daily 90 tablet 3     loratadine (CLARITIN) 10 MG tablet Take 10 mg by mouth daily       Multiple Vitamin (MULTI VITAMIN W/D-3) TABS Take 1 tablet by mouth daily       order for DME Automatic electronic sphygmomanometer including machine, cuff, tubing and all associated supplies. Essential Hypertension, chronic. Lifelong. 1 each 11     Riboflavin (VITAMIN B-2 PO) Take 50 mg by mouth once a week       tamsulosin (FLOMAX) 0.4 MG capsule Take 1 capsule (0.4 mg) by mouth daily 90 capsule 3     ALLERGIES:  Patient has no known allergies.    REVIEW OF SYSTEMS:  Review of Systems   Constitutional: Negative for fever.   Skin: Positive for wound.        There has been no surrounding redness, warmth, odorous or purulent drainage.  See HPI for remainder   Neurological:        Positive for DPN         OBJECTIVE:  /80 (BP Location: Right arm, Patient  Position: Sitting, Cuff Size: Adult Large)   Pulse 89   Temp 96.9  F (36.1  C) (Temporal)   Resp 16   Wt 119.7 kg (263 lb 14.4 oz)   SpO2 99%   BMI 31.48 kg/m    EXAM:   Pleasant gentleman no acute distress.  Affect normal.  Alert and oriented x4.  Bilateral extremities with 2+ edema.  Right foot DP PT is palpable.  Capillary refill less than 3 seconds.  Wound is located over the right heel.  By comparing it to previous photos in epic at recent visits this does appear smaller however there are no measurements for me to compare.  Today the wound measures 1.5 x 1.7 x 0.2 cm.  There is a white adherent slough covering the wound bed however there is a 1 mm rim around the wound border with pink wound bed exposed.  This is open to the subcutaneous tissue.  No surrounding erythema warmth or maceration.  Wound is irrigated with Anasept, pea-sized amount of medical grade honey applied to wound bed, 3 inch Allevyn gentle border light dressing applied.  Medipore tape used to secure dressing.      Inocencia Marquez, REBEKAH

## 2021-09-10 NOTE — PATIENT INSTRUCTIONS
Wear compression stockings daily and diabetic shoes.  Continue cleansing wound with soap and water, rinse clear and pat dry.  Apply pea-sized amount of medihony to wound bed then cover with Allevyn and secure with medipore tape.  Reduce dressing change to Monday, Wednesday and Friday.  Follow up in 1 week.  Will see you again next Friday.

## 2021-09-10 NOTE — NURSING NOTE
"Chief Complaint   Patient presents with     WOUND CARE     right heel        FOOD SECURITY SCREENING QUESTIONS  Hunger Vital Signs:  Within the past 12 months we worried whether our food would run out before we got money to buy more. Never  Within the past 12 months the food we bought just didn't last and we didn't have money to get more. Never  Rahel De La Rosa LPN 9/10/2021 7:47 AM      Initial /80 (BP Location: Right arm, Patient Position: Sitting, Cuff Size: Adult Large)   Pulse 89   Temp 96.9  F (36.1  C) (Temporal)   Resp 16   Wt 119.7 kg (263 lb 14.4 oz)   SpO2 99%   BMI 31.48 kg/m   Estimated body mass index is 31.48 kg/m  as calculated from the following:    Height as of 4/8/21: 1.95 m (6' 4.77\").    Weight as of this encounter: 119.7 kg (263 lb 14.4 oz).  Medication Reconciliation: complete    Rahel De La Rosa LPN  "

## 2021-09-16 DIAGNOSIS — E10.8 TYPE 1 DIABETES MELLITUS WITH UNSPECIFIED COMPLICATIONS (H): ICD-10-CM

## 2021-09-17 ENCOUNTER — OFFICE VISIT (OUTPATIENT)
Dept: INTERNAL MEDICINE | Facility: OTHER | Age: 56
End: 2021-09-17
Attending: NURSE PRACTITIONER
Payer: COMMERCIAL

## 2021-09-17 VITALS
HEART RATE: 83 BPM | OXYGEN SATURATION: 97 % | SYSTOLIC BLOOD PRESSURE: 136 MMHG | DIASTOLIC BLOOD PRESSURE: 82 MMHG | RESPIRATION RATE: 16 BRPM | TEMPERATURE: 98 F | WEIGHT: 267.6 LBS | BODY MASS INDEX: 31.92 KG/M2

## 2021-09-17 DIAGNOSIS — L97.412 DIABETIC ULCER OF RIGHT HEEL ASSOCIATED WITH TYPE 1 DIABETES MELLITUS, WITH FAT LAYER EXPOSED (H): Primary | ICD-10-CM

## 2021-09-17 DIAGNOSIS — Z23 NEED FOR PROPHYLACTIC VACCINATION AND INOCULATION AGAINST INFLUENZA: ICD-10-CM

## 2021-09-17 DIAGNOSIS — E11.42 DIABETIC PERIPHERAL NEUROPATHY (H): ICD-10-CM

## 2021-09-17 DIAGNOSIS — E10.621 DIABETIC ULCER OF RIGHT HEEL ASSOCIATED WITH TYPE 1 DIABETES MELLITUS, WITH FAT LAYER EXPOSED (H): Primary | ICD-10-CM

## 2021-09-17 PROCEDURE — 99212 OFFICE O/P EST SF 10 MIN: CPT | Mod: 25 | Performed by: NURSE PRACTITIONER

## 2021-09-17 PROCEDURE — 90471 IMMUNIZATION ADMIN: CPT | Performed by: NURSE PRACTITIONER

## 2021-09-17 PROCEDURE — 97597 DBRDMT OPN WND 1ST 20 CM/<: CPT | Performed by: NURSE PRACTITIONER

## 2021-09-17 PROCEDURE — 90682 RIV4 VACC RECOMBINANT DNA IM: CPT | Performed by: NURSE PRACTITIONER

## 2021-09-17 ASSESSMENT — ENCOUNTER SYMPTOMS
FEVER: 0
WOUND: 1

## 2021-09-17 ASSESSMENT — PAIN SCALES - GENERAL: PAINLEVEL: NO PAIN (0)

## 2021-09-17 NOTE — NURSING NOTE
"Chief Complaint   Patient presents with     WOUND CARE       FOOD SECURITY SCREENING QUESTIONS  Hunger Vital Signs:  Within the past 12 months we worried whether our food would run out before we got money to buy more. Never  Within the past 12 months the food we bought just didn't last and we didn't have money to get more. Never  Rahel De La Rosa LPN 9/17/2021 1:45 PM      Initial /82 (BP Location: Right arm, Patient Position: Sitting, Cuff Size: Adult Regular)   Pulse 83   Temp 98  F (36.7  C) (Tympanic)   Resp 16   Wt 121.4 kg (267 lb 9.6 oz)   SpO2 97%   BMI 31.92 kg/m   Estimated body mass index is 31.92 kg/m  as calculated from the following:    Height as of 4/8/21: 1.95 m (6' 4.77\").    Weight as of this encounter: 121.4 kg (267 lb 9.6 oz).  Medication Reconciliation: complete    Rahel De La Rosa LPN  "

## 2021-09-17 NOTE — NURSING NOTE
Immunization Documentation    Prior to Immunization administration, verified patients identity using patient's name and date of birth. Please see IMMUNIZATIONS  and order for additional information.  Patient / Parent instructed to remain in clinic for 15 minutes and report any adverse reaction to staff immediately.    Was the entire amount of vaccines given used? Yes    Rahel De La Rosa LPN  9/17/2021   2:07 PM

## 2021-09-17 NOTE — PROGRESS NOTES
ASSESSMENT:    ICD-10-CM    1. Diabetic ulcer of right heel associated with type 1 diabetes mellitus, with fat layer exposed (H)  E10.621 DEBRIDEMENT WOUND UP TO 20 SQ CM    L97.412    2. Diabetic peripheral neuropathy (H)  E11.42    3. Need for prophylactic vaccination and inoculation against influenza  Z23        PLAN:  Wear compression stockings daily and diabetic shoes.  Continue cleansing wound with soap and water, rinse clear and pat dry.  Apply pea-sized amount of medihoney to wound bed then place calcium alginate inside wound then cover with Allevyn and secure with medipore tape.   Dressing change to Monday, Wednesday and Friday.  Schedule appointment with orthotist.  The wound is located over the heel and I do not believe the orthotics are protecting him from friction.  He may need alternative shoe.  Follow up in 2-3 weeks sooner if worse or any evidence of infection.    SUBJECTIVE:    He is here today for wound evaluation.  He has been doing dressing change every other day.  He has been wearing compression stockings.  He has not seen orthotist in more than a year.  He is wearing his work boots with orthotics.  He would like influenza vaccine today.    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)     Benign essential hypertension     Urinary retention     Insulin pump in place     Mixed hyperlipidemia     Type 1 diabetes mellitus with stage 3 chronic kidney disease (H)     Diabetic peripheral neuropathy (H)     Lower limb amputation, great toe, right (H)     Hx diabetic foot infection     Allergic conjunctivitis, bilateral     Moderate protein-calorie malnutrition (H)     Chronic kidney disease, stage IV (severe) (H)     PAST MEDICAL HISTORY:  Past Medical History:   Diagnosis Date     Carpal tunnel syndrome, bilateral      Diabetic foot ulcer (H)      Hyperlipidemia      Hypertension      Microalbuminuria      Neuropathy       Retinopathy      Trigger finger of both hands      Type 1 diabetes (H) 1982     Ulnar nerve entrapment at elbow, left      Urinary retention      SURGICAL HISTORY:  Past Surgical History:   Procedure Laterality Date     AMPUTATION Right     Great toe     CARPAL TUNNEL RELEASE RT/LT Bilateral      CLOSED REDUCTION, PERCUTANEOUS PINNING HIP      age 14, pinned for dislocation     COLONOSCOPY      Due 2022. has had two, history of polyps. recommend 5 year follow-up     ROTATOR CUFF REPAIR RT/LT Bilateral        SOCIAL HISTORY:  Social History     Socioeconomic History     Marital status:      Spouse name: Not on file     Number of children: Not on file     Years of education: Not on file     Highest education level: Not on file   Occupational History     Not on file   Tobacco Use     Smoking status: Never Smoker     Smokeless tobacco: Never Used     Tobacco comment: no ecig   Vaping Use     Vaping Use: Never used   Substance and Sexual Activity     Alcohol use: Yes     Comment: little     Drug use: Never     Sexual activity: Not Currently   Other Topics Concern     Not on file   Social History Narrative    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     - wife Jeanette    1 son- Will     Social Determinants of Health     Financial Resource Strain:      Difficulty of Paying Living Expenses:    Food Insecurity:      Worried About Running Out of Food in the Last Year:      Ran Out of Food in the Last Year:    Transportation Needs:      Lack of Transportation (Medical):      Lack of Transportation (Non-Medical):    Physical Activity:      Days of Exercise per Week:      Minutes of Exercise per Session:    Stress:      Feeling of Stress :    Social Connections:      Frequency of Communication with Friends and Family:      Frequency of Social Gatherings with Friends and Family:      Attends Denominational Services:      Active Member of Clubs or Organizations:      Attends Club or Organization Meetings:       Marital Status:    Intimate Partner Violence:      Fear of Current or Ex-Partner:      Emotionally Abused:      Physically Abused:      Sexually Abused:      FAMILYHISTORY:  Family History   Problem Relation Age of Onset     Cancer Mother         ovarian     Prostate Cancer Father 65     Cancer Brother 50        had colon removed, ?colon cancer     Other - See Comments Son         lymphedema     CURRENT MEDICATIONS:   Current Outpatient Medications   Medication Sig Dispense Refill     aspirin (ASA) 81 MG tablet Take 1 tablet by mouth daily       atorvastatin (LIPITOR) 40 MG tablet Take 1 tablet (40 mg) by mouth daily 90 tablet 3     blood glucose (NO BRAND SPECIFIED) lancets standard Dispense item covered by insurance. Test blood sugar 6 times daily. 100 each 11     blood glucose (NO BRAND SPECIFIED) test strip Dispense item covered by insurance. Test blood sugar 6 times daily. 100 strip 11     blood glucose monitoring (NO BRAND SPECIFIED) meter device kit Dispense option covered by insurance. Test blood sugar 6 times daily. 1 kit 11     Catheters [facility] Bard Magic3 14F ref #[id number] - use to self-catheterize 4 times daily.       Continuous Blood Gluc  (DEXCOM G6 ) RONALD        Continuous Blood Gluc Sensor (DEXCOM G6 SENSOR) MISC every 30 days       Continuous Blood Gluc Transmit (DEXCOM G6 TRANSMITTER) MISC        ergocalciferol (ERGOCALCIFEROL) 1.25 MG (01008 UT) capsule Take 50,000 Units by mouth       furosemide (LASIX) 20 MG tablet Take 2 tablets (40 mg) by mouth 2 times daily 90 tablet 3     glucagon 1 MG kit Inject 1 mg into the muscle as needed       hydrALAZINE (APRESOLINE) 50 MG tablet Take 1 tablet (50 mg) by mouth 2 times daily       HYDRALAZINE-HCTZ PO Take 1 tablet by mouth 2 times daily       insulin aspart (NOVOLOG VIAL) 100 UNITS/ML vial USE AS DIRECTED MAX DAILY DOSE 70 UNITS 60 mL 0     lisinopril (ZESTRIL) 40 MG tablet Take 1 tablet (40 mg) by mouth daily 90 tablet 3      loratadine (CLARITIN) 10 MG tablet Take 10 mg by mouth daily       Multiple Vitamin (MULTI VITAMIN W/D-3) TABS Take 1 tablet by mouth daily       order for DME Automatic electronic sphygmomanometer including machine, cuff, tubing and all associated supplies. Essential Hypertension, chronic. Lifelong. 1 each 11     Riboflavin (VITAMIN B-2 PO) Take 50 mg by mouth once a week       tamsulosin (FLOMAX) 0.4 MG capsule Take 1 capsule (0.4 mg) by mouth daily 90 capsule 3     ALLERGIES:  Patient has no known allergies.    REVIEW OF SYSTEMS:  Review of Systems   Constitutional: Negative for fever.   Skin: Positive for wound.        There has been no surrounding redness, warmth, odorous or purulent drainage.    Neurological:        Positive for DPN         OBJECTIVE:  /82 (BP Location: Right arm, Patient Position: Sitting, Cuff Size: Adult Regular)   Pulse 83   Temp 98  F (36.7  C) (Tympanic)   Resp 16   Wt 121.4 kg (267 lb 9.6 oz)   SpO2 97%   BMI 31.92 kg/m    EXAM:   Pleasant gentleman no acute distress.  Affect normal.  Alert and oriented x4.  Bilateral extremities with 1+ edema.  Right foot DP PT is palpable.  Capillary refill less than 3 seconds.  Wound is located over the right heel.  By comparing it to previous photos in epic at recent visits this does appear smaller however there are no measurements for me to compare.  Today the wound measures 1.8 x 1.3 x 0.2 cm to the slough.  There is a white adherent slough covering the wound bed.  After permission was granted a 15 blade scalpel was used to sharply debride the central portion of the wound.  There was a small amount of bleeding.  Silver nitrate was used for cautery and well-tolerated.  There is still white slough over the wound bed.  No surrounding erythema warmth or maceration.  Wound is irrigated with Anasept, pea-sized amount of medical grade honey applied to wound bed, calcium alginate placed into wound followed by 3 inch Allevyn gentle border light  dressing applied.  Medipore tape used to secure dressing.    3 minutes spent on [facility] debridement; 12 minutes spent on wound care, education, counseling and care coordination.    Inocencia Marquez, NP

## 2021-10-01 ENCOUNTER — TRANSFERRED RECORDS (OUTPATIENT)
Dept: HEALTH INFORMATION MANAGEMENT | Facility: OTHER | Age: 56
End: 2021-10-01

## 2021-10-01 LAB — RETINOPATHY: POSITIVE

## 2021-10-04 ENCOUNTER — OFFICE VISIT (OUTPATIENT)
Dept: INTERNAL MEDICINE | Facility: OTHER | Age: 56
End: 2021-10-04
Attending: NURSE PRACTITIONER
Payer: COMMERCIAL

## 2021-10-04 VITALS
BODY MASS INDEX: 31.71 KG/M2 | HEART RATE: 88 BPM | OXYGEN SATURATION: 98 % | DIASTOLIC BLOOD PRESSURE: 74 MMHG | RESPIRATION RATE: 16 BRPM | SYSTOLIC BLOOD PRESSURE: 136 MMHG | TEMPERATURE: 97.2 F | WEIGHT: 265.8 LBS

## 2021-10-04 DIAGNOSIS — E11.42 DIABETIC PERIPHERAL NEUROPATHY (H): ICD-10-CM

## 2021-10-04 DIAGNOSIS — L97.412 DIABETIC ULCER OF RIGHT HEEL ASSOCIATED WITH TYPE 1 DIABETES MELLITUS, WITH FAT LAYER EXPOSED (H): Primary | ICD-10-CM

## 2021-10-04 DIAGNOSIS — E10.621 DIABETIC ULCER OF RIGHT HEEL ASSOCIATED WITH TYPE 1 DIABETES MELLITUS, WITH FAT LAYER EXPOSED (H): Primary | ICD-10-CM

## 2021-10-04 PROCEDURE — 99213 OFFICE O/P EST LOW 20 MIN: CPT | Performed by: NURSE PRACTITIONER

## 2021-10-04 ASSESSMENT — PAIN SCALES - GENERAL: PAINLEVEL: NO PAIN (0)

## 2021-10-04 ASSESSMENT — ENCOUNTER SYMPTOMS
WOUND: 1
FEVER: 0

## 2021-10-04 NOTE — PROGRESS NOTES
ASSESSMENT:    ICD-10-CM    1. Diabetic ulcer of right heel associated with type 1 diabetes mellitus, with fat layer exposed (H)  E10.621     L97.412    2. Diabetic peripheral neuropathy (H)  E11.42        PLAN:  Wear compression stockings daily and diabetic shoes.  Continue cleansing wound with soap and water, rinse clear and pat dry.  Apply pea-sized amount of medihoney to wound bed then place calcium alginate inside wound then cover with Polymem and secure with medipore tape.   Dressing change to Monday, Wednesday and Friday.  Schedule appointment with orthotist. .  Follow up in 3 weeks sooner if worse or any evidence of infection.    SUBJECTIVE:    He is here today for wound evaluation.  He is doing dressing change 3 times weekly.  He tells me that he still has trouble keeping the dressing in place when he is working and wearing his shoe.  He tries to replace the dressing once he realizes it has come off.  He feels it is getting better.  He has not seen orthotist.    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)     Benign essential hypertension     Urinary retention     Insulin pump in place     Mixed hyperlipidemia     Type 1 diabetes mellitus with stage 3 chronic kidney disease (H)     Diabetic peripheral neuropathy (H)     Lower limb amputation, great toe, right (H)     Hx diabetic foot infection     Allergic conjunctivitis, bilateral     Moderate protein-calorie malnutrition (H)     Chronic kidney disease, stage IV (severe) (H)     PAST MEDICAL HISTORY:  Past Medical History:   Diagnosis Date     Carpal tunnel syndrome, bilateral      Diabetic foot ulcer (H)      Hyperlipidemia      Hypertension      Microalbuminuria      Neuropathy      Retinopathy      Trigger finger of both hands      Type 1 diabetes (H) 1982     Ulnar nerve entrapment at elbow, left      Urinary retention      SURGICAL HISTORY:  Past Surgical History:   Procedure  Laterality Date     AMPUTATION Right     Great toe     CARPAL TUNNEL RELEASE RT/LT Bilateral      CLOSED REDUCTION, PERCUTANEOUS PINNING HIP      age 14, pinned for dislocation     COLONOSCOPY      Due 2022. has had two, history of polyps. recommend 5 year follow-up     ROTATOR CUFF REPAIR RT/LT Bilateral        SOCIAL HISTORY:  Social History     Socioeconomic History     Marital status:      Spouse name: Not on file     Number of children: Not on file     Years of education: Not on file     Highest education level: Not on file   Occupational History     Not on file   Tobacco Use     Smoking status: Never Smoker     Smokeless tobacco: Never Used     Tobacco comment: no ecig   Vaping Use     Vaping Use: Never used   Substance and Sexual Activity     Alcohol use: Yes     Comment: little     Drug use: Never     Sexual activity: Not Currently   Other Topics Concern     Not on file   Social History Narrative    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     - wife Jeanette    1 son- Will     Social Determinants of Health     Financial Resource Strain:      Difficulty of Paying Living Expenses:    Food Insecurity:      Worried About Running Out of Food in the Last Year:      Ran Out of Food in the Last Year:    Transportation Needs:      Lack of Transportation (Medical):      Lack of Transportation (Non-Medical):    Physical Activity:      Days of Exercise per Week:      Minutes of Exercise per Session:    Stress:      Feeling of Stress :    Social Connections:      Frequency of Communication with Friends and Family:      Frequency of Social Gatherings with Friends and Family:      Attends [employer] Services:      Active Member of Clubs or Organizations:      Attends Club or Organization Meetings:      Marital Status:    Intimate Partner Violence:      Fear of Current or Ex-Partner:      Emotionally Abused:      Physically Abused:      Sexually Abused:      FAMILYHISTORY:  Family History    Problem Relation Age of Onset     Cancer Mother         ovarian     Prostate Cancer Father 65     Cancer Brother 50        had colon removed, ?colon cancer     Other - See Comments Son         lymphedema     CURRENT MEDICATIONS:   Current Outpatient Medications   Medication Sig Dispense Refill     aspirin (ASA) 81 MG tablet Take 1 tablet by mouth daily       atorvastatin (LIPITOR) 40 MG tablet Take 1 tablet (40 mg) by mouth daily 90 tablet 3     blood glucose (NO BRAND SPECIFIED) lancets standard Dispense item covered by insurance. Test blood sugar 6 times daily. 100 each 11     blood glucose (NO BRAND SPECIFIED) test strip Dispense item covered by insurance. Test blood sugar 6 times daily. 100 strip 11     blood glucose monitoring (NO BRAND SPECIFIED) meter device kit Dispense option covered by insurance. Test blood sugar 6 times daily. 1 kit 11     Catheters [facility] Magic3 14F ref #[id number] - use to self-catheterize 4 times daily.       Continuous Blood Gluc  (DEXCOM G6 ) RONALD        Continuous Blood Gluc Sensor (DEXCOM G6 SENSOR) MISC every 30 days       Continuous Blood Gluc Transmit (DEXCOM G6 TRANSMITTER) MISC        ergocalciferol (ERGOCALCIFEROL) 1.25 MG (91539 UT) capsule Take 50,000 Units by mouth       furosemide (LASIX) 20 MG tablet Take 2 tablets (40 mg) by mouth 2 times daily 90 tablet 3     glucagon 1 MG kit Inject 1 mg into the muscle as needed       hydrALAZINE (APRESOLINE) 50 MG tablet Take 1 tablet (50 mg) by mouth 2 times daily       HYDRALAZINE-HCTZ PO Take 1 tablet by mouth 2 times daily       insulin aspart (NOVOLOG VIAL) 100 UNITS/ML vial USE AS DIRECTED MAX DAILY DOSE 70 UNITS 60 mL 0     lisinopril (ZESTRIL) 40 MG tablet Take 1 tablet (40 mg) by mouth daily 90 tablet 3     loratadine (CLARITIN) 10 MG tablet Take 10 mg by mouth daily       Multiple Vitamin (MULTI VITAMIN W/D-3) TABS Take 1 tablet by mouth daily       order for DME Automatic electronic sphygmomanometer  including machine, cuff, tubing and all associated supplies. Essential Hypertension, chronic. Lifelong. 1 each 11     Riboflavin (VITAMIN B-2 PO) Take 50 mg by mouth once a week       tamsulosin (FLOMAX) 0.4 MG capsule Take 1 capsule (0.4 mg) by mouth daily 90 capsule 3     ALLERGIES:  Patient has no known allergies.    REVIEW OF SYSTEMS:  Review of Systems   Constitutional: Negative for fever.   Skin: Positive for wound.        There has been no surrounding redness, warmth, odorous or purulent drainage.    Neurological:        Positive for DPN         OBJECTIVE:  /74 (BP Location: Right arm, Patient Position: Sitting, Cuff Size: Adult Large)   Pulse 88   Temp 97.2  F (36.2  C) (Tympanic)   Resp 16   Wt 120.6 kg (265 lb 12.8 oz)   SpO2 98%   BMI 31.71 kg/m    EXAM:   Pleasant gentleman no acute distress.  Affect normal.  Alert and oriented x4.  Bilateral extremities with 1+ edema.  Wound measures 1.3 x 1.1 x 0.2 cm to the slough.  There is a white and brown mixed adherent slough covering the wound bed.  There is 10% granulation tissue at the proximal wound that bleeds easily.  Silver nitrate used for cautery.  Her mission was granted by patient.  No surrounding erythema warmth or maceration.  Wound is irrigated with Anasept, pea-sized amount of medical grade honey applied to wound bed, calcium alginate placed into wound followed by PolyMem.  Medipore tape used to secure dressing.      Inocencia Marquez NP

## 2021-10-04 NOTE — NURSING NOTE
"Chief Complaint   Patient presents with     WOUND CARE     right heel        FOOD SECURITY SCREENING QUESTIONS  Hunger Vital Signs:  Within the past 12 months we worried whether our food would run out before we got money to buy more. Never  Within the past 12 months the food we bought just didn't last and we didn't have money to get more. Never  Rahel De La Rosa LPN 10/4/2021 7:46 AM      Initial /74 (BP Location: Right arm, Patient Position: Sitting, Cuff Size: Adult Large)   Pulse 88   Temp 97.2  F (36.2  C) (Tympanic)   Resp 16   Wt 120.6 kg (265 lb 12.8 oz)   SpO2 98%   BMI 31.71 kg/m   Estimated body mass index is 31.71 kg/m  as calculated from the following:    Height as of 4/8/21: 1.95 m (6' 4.77\").    Weight as of this encounter: 120.6 kg (265 lb 12.8 oz).  Medication Reconciliation: complete    Rahel De La Rosa LPN  "

## 2021-10-09 ENCOUNTER — HEALTH MAINTENANCE LETTER (OUTPATIENT)
Age: 56
End: 2021-10-09

## 2021-10-25 ENCOUNTER — OFFICE VISIT (OUTPATIENT)
Dept: INTERNAL MEDICINE | Facility: OTHER | Age: 56
End: 2021-10-25
Attending: NURSE PRACTITIONER
Payer: COMMERCIAL

## 2021-10-25 VITALS
HEART RATE: 78 BPM | BODY MASS INDEX: 30.91 KG/M2 | SYSTOLIC BLOOD PRESSURE: 134 MMHG | RESPIRATION RATE: 16 BRPM | DIASTOLIC BLOOD PRESSURE: 78 MMHG | OXYGEN SATURATION: 98 % | TEMPERATURE: 97.3 F | WEIGHT: 259.1 LBS

## 2021-10-25 DIAGNOSIS — E10.621 DIABETIC ULCER OF RIGHT HEEL ASSOCIATED WITH TYPE 1 DIABETES MELLITUS, WITH FAT LAYER EXPOSED (H): Primary | ICD-10-CM

## 2021-10-25 DIAGNOSIS — L97.412 DIABETIC ULCER OF RIGHT HEEL ASSOCIATED WITH TYPE 1 DIABETES MELLITUS, WITH FAT LAYER EXPOSED (H): Primary | ICD-10-CM

## 2021-10-25 DIAGNOSIS — E11.42 DIABETIC PERIPHERAL NEUROPATHY (H): ICD-10-CM

## 2021-10-25 PROCEDURE — 99213 OFFICE O/P EST LOW 20 MIN: CPT | Performed by: NURSE PRACTITIONER

## 2021-10-25 ASSESSMENT — PAIN SCALES - GENERAL: PAINLEVEL: NO PAIN (0)

## 2021-10-25 ASSESSMENT — ENCOUNTER SYMPTOMS
FEVER: 0
WOUND: 1

## 2021-10-25 NOTE — PROGRESS NOTES
ASSESSMENT:    ICD-10-CM    1. Diabetic ulcer of right heel associated with type 1 diabetes mellitus, with fat layer exposed (H)  E10.621     L97.412    2. Diabetic peripheral neuropathy (H)  E11.42        PLAN:  Continue with current treatment plan.  Schedule appointment with orthotist.  Follow up in 3 weeks sooner if worse or any evidence of infection.    SUBJECTIVE:    He is here today for wound evaluation.  He is doing dressing change 3 times weekly.  He has been leaving his sock on at nighttime and the dressing has been staying on better.  He has not seen orthotist.    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)     Benign essential hypertension     Urinary retention     Insulin pump in place     Mixed hyperlipidemia     Type 1 diabetes mellitus with stage 3 chronic kidney disease (H)     Diabetic peripheral neuropathy (H)     Lower limb amputation, great toe, right (H)     Hx diabetic foot infection     Allergic conjunctivitis, bilateral     Moderate protein-calorie malnutrition (H)     Chronic kidney disease, stage IV (severe) (H)     PAST MEDICAL HISTORY:  Past Medical History:   Diagnosis Date     Carpal tunnel syndrome, bilateral      Diabetic foot ulcer (H)      Hyperlipidemia      Hypertension      Microalbuminuria      Neuropathy      Retinopathy      Trigger finger of both hands      Type 1 diabetes (H) 1982     Ulnar nerve entrapment at elbow, left      Urinary retention      SURGICAL HISTORY:  Past Surgical History:   Procedure Laterality Date     AMPUTATION Right     Great toe     CARPAL TUNNEL RELEASE RT/LT Bilateral      CLOSED REDUCTION, PERCUTANEOUS PINNING HIP      age 14, pinned for dislocation     COLONOSCOPY      Due 2022. has had two, history of polyps. recommend 5 year follow-up     ROTATOR CUFF REPAIR RT/LT Bilateral        SOCIAL HISTORY:  Social History     Socioeconomic History     Marital status:       Spouse name: Not on file     Number of children: Not on file     Years of education: Not on file     Highest education level: Not on file   Occupational History     Not on file   Tobacco Use     Smoking status: Never Smoker     Smokeless tobacco: Never Used     Tobacco comment: no ecig   Vaping Use     Vaping Use: Never used   Substance and Sexual Activity     Alcohol use: Yes     Comment: little     Drug use: Never     Sexual activity: Not Currently   Other Topics Concern     Not on file   Social History Narrative    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     - wife Jeanette    1 son- Will     Social Determinants of Health     Financial Resource Strain:      Difficulty of Paying Living Expenses:    Food Insecurity:      Worried About Running Out of Food in the Last Year:      Ran Out of Food in the Last Year:    Transportation Needs:      Lack of Transportation (Medical):      Lack of Transportation (Non-Medical):    Physical Activity:      Days of Exercise per Week:      Minutes of Exercise per Session:    Stress:      Feeling of Stress :    Social Connections:      Frequency of Communication with Friends and Family:      Frequency of Social Gatherings with Friends and Family:      Attends [employer] Services:      Active Member of Clubs or Organizations:      Attends Club or Organization Meetings:      Marital Status:    Intimate Partner Violence:      Fear of Current or Ex-Partner:      Emotionally Abused:      Physically Abused:      Sexually Abused:      FAMILYHISTORY:  Family History   Problem Relation Age of Onset     Cancer Mother         ovarian     Prostate Cancer Father 65     Cancer Brother 50        had colon removed, ?colon cancer     Other - See Comments Son         lymphedema     CURRENT MEDICATIONS:   Current Outpatient Medications   Medication Sig Dispense Refill     aspirin (ASA) 81 MG tablet Take 1 tablet by mouth daily       atorvastatin (LIPITOR) 40 MG tablet Take 1  tablet (40 mg) by mouth daily 90 tablet 3     blood glucose (NO BRAND SPECIFIED) lancets standard Dispense item covered by insurance. Test blood sugar 6 times daily. 100 each 11     blood glucose (NO BRAND SPECIFIED) test strip Dispense item covered by insurance. Test blood sugar 6 times daily. 100 strip 11     blood glucose monitoring (NO BRAND SPECIFIED) meter device kit Dispense option covered by insurance. Test blood sugar 6 times daily. 1 kit 11     Catheters [facility] [employer] Magic3 14F ref #[id number] - use to self-catheterize 4 times daily.       Continuous Blood Gluc  (DEXCOM G6 ) RONALD        Continuous Blood Gluc Sensor (DEXCOM G6 SENSOR) MISC every 30 days       Continuous Blood Gluc Transmit (DEXCOM G6 TRANSMITTER) MISC        ergocalciferol (ERGOCALCIFEROL) 1.25 MG (15100 UT) capsule Take 50,000 Units by mouth       furosemide (LASIX) 20 MG tablet Take 2 tablets (40 mg) by mouth 2 times daily 90 tablet 3     glucagon 1 MG kit Inject 1 mg into the muscle as needed       hydrALAZINE (APRESOLINE) 50 MG tablet Take 1 tablet (50 mg) by mouth 2 times daily       HYDRALAZINE-HCTZ PO Take 1 tablet by mouth 2 times daily       insulin aspart (NOVOLOG VIAL) 100 UNITS/ML vial USE AS DIRECTED MAX DAILY DOSE 70 UNITS 60 mL 0     lisinopril (ZESTRIL) 40 MG tablet Take 1 tablet (40 mg) by mouth daily 90 tablet 3     loratadine (CLARITIN) 10 MG tablet Take 10 mg by mouth daily       Multiple Vitamin (MULTI VITAMIN W/D-3) TABS Take 1 tablet by mouth daily       [place] for [facility] [place] sphygmomanometer including machine, cuff, tubing and all associated supplies. Essential Hypertension, chronic. Lifelong. 1 each 11     Riboflavin (VITAMIN B-2 PO) Take 50 mg by mouth once a week       tamsulosin (FLOMAX) 0.4 MG capsule Take 1 capsule (0.4 mg) by mouth daily 90 capsule 3     ALLERGIES:  Patient has no known allergies.    REVIEW OF SYSTEMS:  Review of Systems   Constitutional: Negative for fever.   Skin:  Positive for wound.        There has been no surrounding redness, warmth, odorous or purulent drainage.    Neurological:        Positive for DPN         OBJECTIVE:  /78 (BP Location: Right arm, Patient Position: Sitting, Cuff Size: Adult Large)   Pulse 78   Temp 97.3  F (36.3  C) (Tympanic)   Resp 16   Wt 117.5 kg (259 lb 1.6 oz)   SpO2 98%   BMI 30.91 kg/m    EXAM:   Pleasant gentleman no acute distress.  Affect normal.  Alert and oriented x4.  Bilateral extremities with 1+ edema.  Wound measures 0.7 x 0.7 x 0.3 cm to the slough.  There is 70% yellow adherent slough and 30% granulation tissue.  No surrounding erythema warmth or maceration.  Wound is irrigated with Anasept, pea-sized amount of medical grade honey applied to wound bed, calcium alginate placed into wound followed by PolyMem.  Medipore tape used to secure dressing.      Inocencia Marquez, REBEKAH

## 2021-10-25 NOTE — NURSING NOTE
"Chief Complaint   Patient presents with     WOUND CARE     right heel        FOOD SECURITY SCREENING QUESTIONS  Hunger Vital Signs:  Within the past 12 months we worried whether our food would run out before we got money to buy more. Never  Within the past 12 months the food we bought just didn't last and we didn't have money to get more. Never  Rahel De La Rosa LPN 10/25/2021 8:05 AM      Initial /78 (BP Location: Right arm, Patient Position: Sitting, Cuff Size: Adult Large)   Pulse 78   Temp 97.3  F (36.3  C) (Tympanic)   Resp 16   Wt 117.5 kg (259 lb 1.6 oz)   SpO2 98%   BMI 30.91 kg/m   Estimated body mass index is 30.91 kg/m  as calculated from the following:    Height as of 4/8/21: 1.95 m (6' 4.77\").    Weight as of this encounter: 117.5 kg (259 lb 1.6 oz).  Medication Reconciliation: complete    Rahel De La Rosa LPN  "

## 2021-11-16 ENCOUNTER — OFFICE VISIT (OUTPATIENT)
Dept: INTERNAL MEDICINE | Facility: OTHER | Age: 56
End: 2021-11-16
Attending: NURSE PRACTITIONER
Payer: COMMERCIAL

## 2021-11-16 VITALS
HEART RATE: 85 BPM | BODY MASS INDEX: 31.61 KG/M2 | TEMPERATURE: 97.1 F | DIASTOLIC BLOOD PRESSURE: 80 MMHG | RESPIRATION RATE: 16 BRPM | OXYGEN SATURATION: 98 % | SYSTOLIC BLOOD PRESSURE: 138 MMHG | WEIGHT: 265 LBS

## 2021-11-16 DIAGNOSIS — E11.42 DIABETIC PERIPHERAL NEUROPATHY (H): ICD-10-CM

## 2021-11-16 DIAGNOSIS — E10.621 DIABETIC ULCER OF RIGHT HEEL ASSOCIATED WITH TYPE 1 DIABETES MELLITUS, WITH FAT LAYER EXPOSED (H): Primary | ICD-10-CM

## 2021-11-16 DIAGNOSIS — L97.412 DIABETIC ULCER OF RIGHT HEEL ASSOCIATED WITH TYPE 1 DIABETES MELLITUS, WITH FAT LAYER EXPOSED (H): Primary | ICD-10-CM

## 2021-11-16 PROCEDURE — 99213 OFFICE O/P EST LOW 20 MIN: CPT | Performed by: NURSE PRACTITIONER

## 2021-11-16 ASSESSMENT — ENCOUNTER SYMPTOMS
WOUND: 1
FEVER: 0

## 2021-11-16 ASSESSMENT — PAIN SCALES - GENERAL: PAINLEVEL: NO PAIN (0)

## 2021-11-16 NOTE — NURSING NOTE
"Chief Complaint   Patient presents with     WOUND CARE     right heel        FOOD SECURITY SCREENING QUESTIONS  Hunger Vital Signs:  Within the past 12 months we worried whether our food would run out before we got money to buy more. Never  Within the past 12 months the food we bought just didn't last and we didn't have money to get more. Never  Rahel De La Rosa LPN 11/16/2021 8:28 AM      Initial /80 (BP Location: Right arm, Patient Position: Sitting, Cuff Size: Adult Large)   Pulse 85   Temp 97.1  F (36.2  C) (Tympanic)   Resp 16   Wt 120.2 kg (265 lb)   SpO2 98%   BMI 31.61 kg/m   Estimated body mass index is 31.61 kg/m  as calculated from the following:    Height as of 4/8/21: 1.95 m (6' 4.77\").    Weight as of this encounter: 120.2 kg (265 lb).  Medication Reconciliation: complete    Rahel De La Rosa LPN  "

## 2021-11-16 NOTE — PROGRESS NOTES
ASSESSMENT:    ICD-10-CM    1. Diabetic ulcer of right heel associated with type 1 diabetes mellitus, with fat layer exposed (H)  E10.621     L97.412    2. Diabetic peripheral neuropathy (H)  E11.42        PLAN:  Continue with current treatment plan.   Follow up in 3 weeks sooner if worse or any evidence of infection.    SUBJECTIVE:    He is here today for wound evaluation.  He is doing dressing change 3 times weekly.  He feels that the wound is improving.    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)     Benign essential hypertension     Urinary retention     Insulin pump in place     Mixed hyperlipidemia     Type 1 diabetes mellitus with stage 3 chronic kidney disease (H)     Diabetic peripheral neuropathy (H)     Lower limb amputation, great toe, right (H)     Hx diabetic foot infection     Allergic conjunctivitis, bilateral     Moderate protein-calorie malnutrition (H)     Chronic kidney disease, stage IV (severe) (H)     PAST MEDICAL HISTORY:  Past Medical History:   Diagnosis Date     Carpal tunnel syndrome, bilateral      Diabetic foot ulcer (H)      Hyperlipidemia      Hypertension      Microalbuminuria      Neuropathy      Retinopathy      Trigger finger of both hands      Type 1 diabetes (H) 1982     Ulnar nerve entrapment at elbow, left      Urinary retention      SURGICAL HISTORY:  Past Surgical History:   Procedure Laterality Date     AMPUTATION Right     Great toe     CARPAL TUNNEL RELEASE RT/LT Bilateral      CLOSED REDUCTION, PERCUTANEOUS PINNING HIP      age 14, pinned for dislocation     COLONOSCOPY      Due 2022. has had two, history of polyps. recommend 5 year follow-up     ROTATOR CUFF REPAIR RT/LT Bilateral        SOCIAL HISTORY:  Social History     Socioeconomic History     Marital status:      Spouse name: Not on file     Number of children: Not on file     Years of education: Not on file     Highest education  level: Not on file   Occupational History     Not on file   Tobacco Use     Smoking status: Never Smoker     Smokeless tobacco: Never Used     Tobacco comment: no ecig   Vaping Use     Vaping Use: Never used   Substance and Sexual Activity     Alcohol use: Yes     Comment: little     Drug use: Never     Sexual activity: Not Currently   Other Topics Concern     Not on file   Social History Narrative    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     - wife Jeanette    1 son- Will     Social Determinants of Health     Financial Resource Strain: Not on file   Food Insecurity: Not on file   Transportation Needs: Not on file   Physical Activity: Not on file   Stress: Not on file   Social Connections: Not on file   Intimate Partner Violence: Not on file   Housing Stability: Not on file     FAMILYHISTORY:  Family History   Problem Relation Age of Onset     Cancer Mother         ovarian     Prostate Cancer Father 65     Cancer Brother 50        had colon removed, ?colon cancer     Other - See Comments Son         lymphedema     CURRENT MEDICATIONS:   Current Outpatient Medications   Medication Sig Dispense Refill     aspirin (ASA) 81 MG tablet Take 1 tablet by mouth daily       atorvastatin (LIPITOR) 40 MG tablet Take 1 tablet (40 mg) by mouth daily 90 tablet 3     blood glucose (NO BRAND SPECIFIED) lancets standard Dispense item covered by insurance. Test blood sugar 6 times daily. 100 each 11     blood glucose (NO BRAND SPECIFIED) test strip Dispense item covered by insurance. Test blood sugar 6 times daily. 100 strip 11     blood glucose monitoring (NO BRAND SPECIFIED) meter device kit Dispense option covered by insurance. Test blood sugar 6 times daily. 1 kit 11     Catheters [facility] Magic3 14F ref #[id number] - use to self-catheterize 4 times daily.       Continuous Blood Gluc  (DEXCOM G6 ) RONALD        Continuous Blood Gluc Sensor (DEXCOM G6 SENSOR) MISC every 30 days       Continuous  Blood Gluc Transmit (DEXCOM G6 TRANSMITTER) MISC        ergocalciferol (ERGOCALCIFEROL) 1.25 MG (96203 UT) capsule Take 50,000 Units by mouth       furosemide (LASIX) 20 MG tablet Take 2 tablets (40 mg) by mouth 2 times daily 90 tablet 3     glucagon 1 MG kit Inject 1 mg into the muscle as needed       hydrALAZINE (APRESOLINE) 50 MG tablet Take 1 tablet (50 mg) by mouth 2 times daily       HYDRALAZINE-HCTZ PO Take 1 tablet by mouth 2 times daily       insulin aspart (NOVOLOG VIAL) 100 UNITS/ML vial USE AS DIRECTED MAX DAILY DOSE 70 UNITS 60 mL 0     lisinopril (ZESTRIL) 40 MG tablet Take 1 tablet (40 mg) by mouth daily 90 tablet 3     loratadine (CLARITIN) 10 MG tablet Take 10 mg by mouth daily       Multiple Vitamin (MULTI VITAMIN W/D-3) TABS Take 1 tablet by mouth daily       order for DME Automatic electronic sphygmomanometer including machine, cuff, tubing and all associated supplies. Essential Hypertension, chronic. Lifelong. 1 each 11     Riboflavin (VITAMIN B-2 PO) Take 50 mg by mouth once a week       tamsulosin (FLOMAX) 0.4 MG capsule Take 1 capsule (0.4 mg) by mouth daily 90 capsule 3     ALLERGIES:  Patient has no known allergies.    REVIEW OF SYSTEMS:  Review of Systems   Constitutional: Negative for fever.   Skin: Positive for wound.        There has been no surrounding redness, warmth, odorous or purulent drainage.    Neurological:        Positive for DPN         OBJECTIVE:  /80 (BP Location: Right arm, Patient Position: Sitting, Cuff Size: Adult Large)   Pulse 85   Temp 97.1  F (36.2  C) (Tympanic)   Resp 16   Wt 120.2 kg (265 lb)   SpO2 98%   BMI 31.61 kg/m    EXAM:   Pleasant gentleman no acute distress.  Affect normal.  Alert and oriented x4.  Bilateral extremities with 1+ edema.  Wound measures 0.5 x 0.3 x 0.1 cm. There is 100% granulation tissue.  No surrounding erythema warmth or maceration.  Wound is irrigated with Anasept, pea-sized amount of medical grade honey applied to wound  bed, calcium alginate placed into wound followed by PolyMem.  Medipore tape used to secure dressing.      Inocencia Marquez, NP

## 2021-11-23 DIAGNOSIS — E10.8 TYPE 1 DIABETES MELLITUS WITH UNSPECIFIED COMPLICATIONS (H): ICD-10-CM

## 2021-11-24 NOTE — TELEPHONE ENCOUNTER
Prescription approved per [facility] Refill Protocol.  LOV: 7/19/2021  Belkys Campbell RN on 11/24/2021 at 9:17 AM

## 2021-12-04 ENCOUNTER — HEALTH MAINTENANCE LETTER (OUTPATIENT)
Age: 56
End: 2021-12-04

## 2021-12-07 ENCOUNTER — OFFICE VISIT (OUTPATIENT)
Dept: INTERNAL MEDICINE | Facility: OTHER | Age: 56
End: 2021-12-07
Attending: NURSE PRACTITIONER
Payer: COMMERCIAL

## 2021-12-07 VITALS
SYSTOLIC BLOOD PRESSURE: 138 MMHG | RESPIRATION RATE: 16 BRPM | WEIGHT: 269 LBS | TEMPERATURE: 97.7 F | BODY MASS INDEX: 32.09 KG/M2 | OXYGEN SATURATION: 97 % | DIASTOLIC BLOOD PRESSURE: 80 MMHG | HEART RATE: 82 BPM

## 2021-12-07 DIAGNOSIS — L97.412 DIABETIC ULCER OF RIGHT HEEL ASSOCIATED WITH TYPE 1 DIABETES MELLITUS, WITH FAT LAYER EXPOSED (H): Primary | ICD-10-CM

## 2021-12-07 DIAGNOSIS — E10.621 DIABETIC ULCER OF RIGHT HEEL ASSOCIATED WITH TYPE 1 DIABETES MELLITUS, WITH FAT LAYER EXPOSED (H): Primary | ICD-10-CM

## 2021-12-07 DIAGNOSIS — E11.42 DIABETIC PERIPHERAL NEUROPATHY (H): ICD-10-CM

## 2021-12-07 PROCEDURE — 97597 DBRDMT OPN WND 1ST 20 CM/<: CPT | Performed by: NURSE PRACTITIONER

## 2021-12-07 ASSESSMENT — ENCOUNTER SYMPTOMS
FEVER: 0
WOUND: 1

## 2021-12-07 ASSESSMENT — PAIN SCALES - GENERAL: PAINLEVEL: NO PAIN (0)

## 2021-12-07 NOTE — PROGRESS NOTES
ASSESSMENT:    ICD-10-CM    1. Diabetic ulcer of right heel associated with type 1 diabetes mellitus, with fat layer exposed (H)  E10.621 DEBRIDEMENT WOUND UP TO 20 SQ CM    L97.412    2. Diabetic peripheral neuropathy (H)  E11.42        PLAN:  Wound has healed.  Continue with offloading diabetic shoe at all times.  Follow-up as needed.    SUBJECTIVE:    He is here today for wound evaluation.  He is doing dressing change 3 times weekly.  He feels that the wound is improving.  There has been no drainage recently.    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)     Benign essential hypertension     Urinary retention     Insulin pump in place     Mixed hyperlipidemia     Type 1 diabetes mellitus with stage 3 chronic kidney disease (H)     Diabetic peripheral neuropathy (H)     Lower limb amputation, great toe, right (H)     Hx diabetic foot infection     Allergic conjunctivitis, bilateral     Moderate protein-calorie malnutrition (H)     Chronic kidney disease, stage IV (severe) (H)     PAST MEDICAL HISTORY:  Past Medical History:   Diagnosis Date     Carpal tunnel syndrome, bilateral      Diabetic foot ulcer (H)      Hyperlipidemia      Hypertension      Microalbuminuria      Neuropathy      Retinopathy      Trigger finger of both hands      Type 1 diabetes (H) 1982     Ulnar nerve entrapment at elbow, left      Urinary retention      SURGICAL HISTORY:  Past Surgical History:   Procedure Laterality Date     AMPUTATION Right     Great toe     CARPAL TUNNEL RELEASE RT/LT Bilateral      CLOSED REDUCTION, PERCUTANEOUS PINNING HIP      age 14, pinned for dislocation     COLONOSCOPY      Due 2022. has had two, history of polyps. recommend 5 year follow-up     ROTATOR CUFF REPAIR RT/LT Bilateral        SOCIAL HISTORY:  Social History     Socioeconomic History     Marital status:      Spouse name: Not on file     Number of children: Not on file      Years of education: Not on file     Highest education level: Not on file   Occupational History     Not on file   Tobacco Use     Smoking status: Never Smoker     Smokeless tobacco: Never Used     Tobacco comment: no ecig   Vaping Use     Vaping Use: Never used   Substance and Sexual Activity     Alcohol use: Yes     Comment: little     Drug use: Never     Sexual activity: Not Currently   Other Topics Concern     Not on file   Social History Narrative    Works at [employer] as a [employer]     - wife Jeanette    1 son- Will     Social Determinants of Health     Financial Resource Strain: Not on file   Food Insecurity: Not on file   Transportation Needs: Not on file   Physical Activity: Not on file   Stress: Not on file   Social Connections: Not on file   Intimate Partner Violence: Not on file   Housing Stability: Not on file     FAMILYHISTORY:  Family History   Problem Relation Age of Onset     Cancer Mother         ovarian     Prostate Cancer Father 65     Cancer Brother 50        had colon removed, ?colon cancer     Other - See Comments Son         lymphedema     CURRENT MEDICATIONS:   Current Outpatient Medications   Medication Sig Dispense Refill     aspirin (ASA) 81 MG tablet Take 1 tablet by mouth daily       atorvastatin (LIPITOR) 40 MG tablet Take 1 tablet (40 mg) by mouth daily 90 tablet 3     blood glucose (NO BRAND SPECIFIED) lancets standard Dispense item covered by insurance. Test blood sugar 6 times daily. 100 each 11     blood glucose (NO BRAND SPECIFIED) test strip Dispense item covered by insurance. Test blood sugar 6 times daily. 100 strip 11     blood glucose monitoring (NO BRAND SPECIFIED) meter device kit Dispense option covered by insurance. Test blood sugar 6 times daily. 1 kit 11     Catheters [facility] Bard Magic3 14F ref #[id number] - use to self-catheterize 4 times daily.       Continuous Blood Gluc  (DEXCOM G6 ) RONALD        Continuous Blood Gluc  Sensor (DEXCOM G6 SENSOR) MISC every 30 days       Continuous Blood Gluc Transmit (DEXCOM G6 TRANSMITTER) MISC        ergocalciferol (ERGOCALCIFEROL) 1.25 MG (02667 UT) capsule Take 50,000 Units by mouth       furosemide (LASIX) 20 MG tablet Take 2 tablets (40 mg) by mouth 2 times daily 90 tablet 3     glucagon 1 MG kit Inject 1 mg into the muscle as needed       hydrALAZINE (APRESOLINE) 50 MG tablet Take 1 tablet (50 mg) by mouth 2 times daily       HYDRALAZINE-HCTZ PO Take 1 tablet by mouth 2 times daily       insulin aspart (NOVOLOG VIAL) 100 UNITS/ML vial USE AS DIRECTED. MAX DAILY DOSE 70 UNITS 60 mL 0     lisinopril (ZESTRIL) 40 MG tablet Take 1 tablet (40 mg) by mouth daily 90 tablet 3     loratadine (CLARITIN) 10 MG tablet Take 10 mg by mouth daily       Multiple Vitamin (MULTI VITAMIN W/D-3) TABS Take 1 tablet by mouth daily       order for DME Automatic electronic sphygmomanometer including machine, cuff, tubing and all associated supplies. Essential Hypertension, chronic. Lifelong. 1 each 11     Riboflavin (VITAMIN B-2 PO) Take 50 mg by mouth once a week       tamsulosin (FLOMAX) 0.4 MG capsule Take 1 capsule (0.4 mg) by mouth daily 90 capsule 3     ALLERGIES:  Patient has no known allergies.    REVIEW OF SYSTEMS:  Review of Systems   Constitutional: Negative for fever.   Skin: Positive for wound.        There has been no surrounding redness, warmth, odorous or purulent drainage.    Neurological:        Positive for DPN         OBJECTIVE:  /80 (BP Location: Right arm, Patient Position: Sitting, Cuff Size: Adult Large)   Pulse 82   Temp 97.7  F (36.5  C) (Tympanic)   Resp 16   Wt 122 kg (269 lb)   SpO2 97%   BMI 32.09 kg/m    EXAM:   Pleasant gentleman no acute distress.  Affect normal.  Alert and oriented x4.  Bilateral extremities with 1+ edema.  Diabetic ulcer is surrounded and covered with soft devitalized hyperkeratotic tissue.  After permission granted, devitalized tissue sharply debrided  with a 15 blade scalpel.  After this is removed it is evident that the diabetic ulcer has closed completely.      Inocencia Marquez, NP

## 2021-12-07 NOTE — NURSING NOTE
"Chief Complaint   Patient presents with     WOUND CARE     right heel        FOOD SECURITY SCREENING QUESTIONS  Hunger Vital Signs:  Within the past 12 months we worried whether our food would run out before we got money to buy more. Never  Within the past 12 months the food we bought just didn't last and we didn't have money to get more. Never  Rahel De La Rosa LPN 12/7/2021 11:11 AM      Initial /80 (BP Location: Right arm, Patient Position: Sitting, Cuff Size: Adult Large)   Pulse 82   Temp 97.7  F (36.5  C) (Tympanic)   Resp 16   Wt 122 kg (269 lb)   SpO2 97%   BMI 32.09 kg/m   Estimated body mass index is 32.09 kg/m  as calculated from the following:    Height as of 4/8/21: 1.95 m (6' 4.77\").    Weight as of this encounter: 122 kg (269 lb).  Medication Reconciliation: complete    Rahel De La Rosa LPN  "

## 2022-01-14 ENCOUNTER — TRANSFERRED RECORDS (OUTPATIENT)
Dept: HEALTH INFORMATION MANAGEMENT | Facility: OTHER | Age: 57
End: 2022-01-14
Payer: COMMERCIAL

## 2022-01-14 LAB — RETINOPATHY: POSITIVE

## 2022-03-08 ENCOUNTER — OFFICE VISIT (OUTPATIENT)
Dept: PEDIATRICS | Facility: OTHER | Age: 57
End: 2022-03-08
Attending: INTERNAL MEDICINE
Payer: COMMERCIAL

## 2022-03-08 VITALS
SYSTOLIC BLOOD PRESSURE: 160 MMHG | HEART RATE: 84 BPM | RESPIRATION RATE: 16 BRPM | DIASTOLIC BLOOD PRESSURE: 84 MMHG | OXYGEN SATURATION: 98 % | HEIGHT: 76 IN | TEMPERATURE: 98 F | BODY MASS INDEX: 31.78 KG/M2 | WEIGHT: 261 LBS

## 2022-03-08 DIAGNOSIS — I10 BENIGN ESSENTIAL HYPERTENSION: ICD-10-CM

## 2022-03-08 DIAGNOSIS — N18.31 STAGE 3A CHRONIC KIDNEY DISEASE (H): ICD-10-CM

## 2022-03-08 DIAGNOSIS — I87.8 VENOUS STASIS OF BOTH LOWER EXTREMITIES: ICD-10-CM

## 2022-03-08 DIAGNOSIS — N40.0 BENIGN PROSTATIC HYPERPLASIA, UNSPECIFIED WHETHER LOWER URINARY TRACT SYMPTOMS PRESENT: ICD-10-CM

## 2022-03-08 DIAGNOSIS — E10.65 UNCONTROLLED TYPE 1 DIABETES MELLITUS WITH HYPERGLYCEMIA (H): Primary | ICD-10-CM

## 2022-03-08 DIAGNOSIS — E10.8 TYPE 1 DIABETES MELLITUS WITH UNSPECIFIED COMPLICATIONS (H): ICD-10-CM

## 2022-03-08 DIAGNOSIS — E10.42 DIABETIC POLYNEUROPATHY ASSOCIATED WITH TYPE 1 DIABETES MELLITUS (H): ICD-10-CM

## 2022-03-08 DIAGNOSIS — E78.2 MIXED HYPERLIPIDEMIA: Chronic | ICD-10-CM

## 2022-03-08 LAB
ANION GAP SERPL CALCULATED.3IONS-SCNC: 8 MMOL/L (ref 3–14)
BUN SERPL-MCNC: 32 MG/DL (ref 7–25)
CALCIUM SERPL-MCNC: 9.3 MG/DL (ref 8.6–10.3)
CHLORIDE BLD-SCNC: 108 MMOL/L (ref 98–107)
CO2 SERPL-SCNC: 26 MMOL/L (ref 21–31)
CREAT SERPL-MCNC: 1.59 MG/DL (ref 0.7–1.3)
DEPRECATED CALCIDIOL+CALCIFEROL SERPL-MC: 41 UG/L (ref 30–100)
ERYTHROCYTE [DISTWIDTH] IN BLOOD BY AUTOMATED COUNT: 12.9 % (ref 10–15)
GFR SERPL CREATININE-BSD FRML MDRD: 50 ML/MIN/1.73M2
GLUCOSE BLD-MCNC: 71 MG/DL (ref 70–105)
HBA1C MFR BLD: 7.7 % (ref 4–6.2)
HCT VFR BLD AUTO: 31.8 % (ref 40–53)
HGB BLD-MCNC: 10.9 G/DL (ref 13.3–17.7)
MCH RBC QN AUTO: 30.5 PG (ref 26.5–33)
MCHC RBC AUTO-ENTMCNC: 34.3 G/DL (ref 31.5–36.5)
MCV RBC AUTO: 89 FL (ref 78–100)
PHOSPHATE SERPL-MCNC: 3.9 MG/DL (ref 2.5–5)
PLATELET # BLD AUTO: 279 10E3/UL (ref 150–450)
POTASSIUM BLD-SCNC: 3.8 MMOL/L (ref 3.5–5.1)
PTH-INTACT SERPL-MCNC: 27 PG/ML (ref 12–88)
RBC # BLD AUTO: 3.57 10E6/UL (ref 4.4–5.9)
SODIUM SERPL-SCNC: 142 MMOL/L (ref 134–144)
TSH SERPL DL<=0.005 MIU/L-ACNC: 1.41 MU/L (ref 0.4–4)
WBC # BLD AUTO: 6.5 10E3/UL (ref 4–11)

## 2022-03-08 PROCEDURE — 82306 VITAMIN D 25 HYDROXY: CPT | Mod: ZL | Performed by: INTERNAL MEDICINE

## 2022-03-08 PROCEDURE — 85027 COMPLETE CBC AUTOMATED: CPT | Mod: ZL | Performed by: INTERNAL MEDICINE

## 2022-03-08 PROCEDURE — 99214 OFFICE O/P EST MOD 30 MIN: CPT | Performed by: INTERNAL MEDICINE

## 2022-03-08 PROCEDURE — 84100 ASSAY OF PHOSPHORUS: CPT | Mod: ZL | Performed by: INTERNAL MEDICINE

## 2022-03-08 PROCEDURE — 84443 ASSAY THYROID STIM HORMONE: CPT | Mod: ZL | Performed by: INTERNAL MEDICINE

## 2022-03-08 PROCEDURE — 83036 HEMOGLOBIN GLYCOSYLATED A1C: CPT | Mod: ZL | Performed by: INTERNAL MEDICINE

## 2022-03-08 PROCEDURE — 80048 BASIC METABOLIC PNL TOTAL CA: CPT | Mod: ZL | Performed by: INTERNAL MEDICINE

## 2022-03-08 PROCEDURE — 36415 COLL VENOUS BLD VENIPUNCTURE: CPT | Mod: ZL | Performed by: INTERNAL MEDICINE

## 2022-03-08 PROCEDURE — 83970 ASSAY OF PARATHORMONE: CPT | Mod: ZL | Performed by: INTERNAL MEDICINE

## 2022-03-08 RX ORDER — ATORVASTATIN CALCIUM 40 MG/1
40 TABLET, FILM COATED ORAL DAILY
Qty: 90 TABLET | Refills: 3 | Status: SHIPPED | OUTPATIENT
Start: 2022-03-08 | End: 2023-05-25

## 2022-03-08 RX ORDER — LISINOPRIL 40 MG/1
40 TABLET ORAL DAILY
Qty: 90 TABLET | Refills: 3 | Status: SHIPPED | OUTPATIENT
Start: 2022-03-08 | End: 2022-11-15

## 2022-03-08 RX ORDER — HYDRALAZINE HYDROCHLORIDE 50 MG/1
50 TABLET, FILM COATED ORAL 2 TIMES DAILY
Qty: 180 TABLET | Refills: 3 | Status: SHIPPED | OUTPATIENT
Start: 2022-03-08 | End: 2022-11-15

## 2022-03-08 RX ORDER — TAMSULOSIN HYDROCHLORIDE 0.4 MG/1
0.4 CAPSULE ORAL DAILY
Qty: 90 CAPSULE | Refills: 3 | Status: SHIPPED | OUTPATIENT
Start: 2022-03-08 | End: 2023-04-20

## 2022-03-08 RX ORDER — FUROSEMIDE 20 MG
40 TABLET ORAL 2 TIMES DAILY
Qty: 90 TABLET | Refills: 3 | Status: SHIPPED | OUTPATIENT
Start: 2022-03-08 | End: 2022-11-15

## 2022-03-08 RX ORDER — ERGOCALCIFEROL 1.25 MG/1
50000 CAPSULE ORAL WEEKLY
Qty: 12 CAPSULE | Refills: 4 | Status: SHIPPED | OUTPATIENT
Start: 2022-03-08 | End: 2023-05-09

## 2022-03-08 ASSESSMENT — PAIN SCALES - GENERAL: PAINLEVEL: NO PAIN (0)

## 2022-03-08 NOTE — PROGRESS NOTES
Assessment & Plan   1. Uncontrolled type 1 diabetes mellitus with hyperglycemia (H)  He is making significant strides with regards to his diabetes.  - blood glucose (NO BRAND SPECIFIED) lancets standard; Dispense item covered by insurance. Test blood sugar 6 times daily.  Dispense: 100 each; Refill: 11  - blood glucose (NO BRAND SPECIFIED) test strip; Dispense item covered by insurance. Test blood sugar 6 times daily.  Dispense: 100 strip; Refill: 11  - CBC with platelets; Future  - Basic metabolic panel; Future  - Hemoglobin A1c; Future  - Hemoglobin A1c  - Basic metabolic panel  - CBC with platelets    2. Diabetic polyneuropathy associated with type 1 diabetes mellitus (H)      3. Mixed hyperlipidemia  At goal, no change.  - atorvastatin (LIPITOR) 40 MG tablet; Take 1 tablet (40 mg) by mouth daily  Dispense: 90 tablet; Refill: 3    4. Venous stasis of both lower extremities  At goal, no change.  - furosemide (LASIX) 20 MG tablet; Take 2 tablets (40 mg) by mouth 2 times daily  Dispense: 90 tablet; Refill: 3    5. Benign essential hypertension  Blood pressures are not at goal.  He indicates he is taking his medications as prescribed.  Only lifestyle change we can identify is that he has been less active over the winter.  We discussed options and decided on some home blood pressure monitoring.  He will send me a blood pressure log and we may be initiating additional medication.  Close follow-up will be requested.  - lisinopril (ZESTRIL) 40 MG tablet; Take 1 tablet (40 mg) by mouth daily  Dispense: 90 tablet; Refill: 3  - hydrALAZINE (APRESOLINE) 50 MG tablet; Take 1 tablet (50 mg) by mouth 2 times daily  Dispense: 180 tablet; Refill: 3  - Basic metabolic panel; Future  - Basic metabolic panel    6. Benign prostatic hyperplasia, unspecified whether lower urinary tract symptoms present  At goal, no change.  - tamsulosin (FLOMAX) 0.4 MG capsule; Take 1 capsule (0.4 mg) by mouth daily  Dispense: 90 capsule; Refill:  3    7. Type 1 diabetes mellitus with unspecified complications (H)  - insulin aspart (NOVOLOG VIAL) 100 UNITS/ML vial; USE AS DIRECTED. MAX DAILY DOSE 70 UNITS  Dispense: 60 mL; Refill: 11    8. Stage 3a chronic kidney disease (H)  Appreciate ongoing care from Dr. Culver of nephrology.  - Parathyroid Hormone Intact; Future  - Phosphorus; Future  - ergocalciferol (ERGOCALCIFEROL) 1.25 MG (34163 UT) capsule; Take 1 capsule (50,000 Units) by mouth once a week  Dispense: 12 capsule; Refill: 4  - Vitamin D Deficiency; Future  - TSH; Future  - TSH  - Vitamin D Deficiency  - Phosphorus  - Parathyroid Hormone Intact          Patient Instructions     Aspects of Diabetes we can improve:  Hemoglobin A1c Lab Results   Component Value Date    A1C 10.5 07/19/2021    A1C 10.3 02/18/2021    A1C 13.6 12/14/2020    A1C 7.2 05/21/2020    Goal range is under 8. Best is 6.5 to 7   Blood Pressure 160/84 Goal to keep less than 140/90   Tobacco  reports that he has never smoked. He has never used smokeless tobacco. Goal to abstain from tobacco   Aspirin yes Aspirin reduces risk of heart disease and stroke   ACE/ARB lisinopril These medications reduce risk of kidney disease   Cholesterol lipitor Statins reduce risk of heart disease and stroke   Eye Exam annual Annual diabetic eye exam   Healthy weight Body mass index is 31.56 kg/m . Goal BMI under 30, best is under 25.      -- I'm trying to exercise daily (goal at least 20 min/day) with moderate aerobic activity   -- Eat healthy (resources from ADA at http://www.diabetes.org/)   -- I'm taking good care of my feet. Consider seeing the Podiatrist   -- Check blood sugars as directed, record in log book and bring to every appointment   -- Goal sugar before breakfast: under 140   -- Goal sugar 2 hours after supper: under 170   -- Next diabetes lab draw: 3 months   -- Next diabetes office visit: 3 months        Return in about 3 months (around 6/8/2022) for diabetes.    Signed, Tello Pro  "MD, FAAP, FACP  Internal Medicine & Pediatrics    Subjective   Clemente Graham is a 57 year old male who presents for diabetes checkup.  He did know his blood pressures were higher.  His blood sugars have been a lot better.  He attributes this to using his Dexcom \"when I can afford the sensors.\"    Objective   Vitals: BP (!) 160/84   Pulse 84   Temp 98  F (36.7  C) (Tympanic)   Resp 16   Ht 1.937 m (6' 4.25\")   Wt 118.4 kg (261 lb)   SpO2 98%   BMI 31.56 kg/m      Foot Exam:  3/8/2022  Absent to monofilament bilaterally to proximal calf.  Skin intact without erythema.  Absent right great toe distally      Review and Analysis of Data   I personally reviewed the following:  External notes: Yes, I reviewed notes from Dr. Culver from last summer  Results: Yes Most recent diabetic and kidney labs are reviewed  Use of an independent historian: No  Independent review of a test performed by another physician: No  Discussion of management with another physician: No  Moderate risk of morbidity from additional diagnostic testing and/or treatment.    Patient currently uses a Dexcom G6 CGM for continuous monitoring of glucose.   Patient injects or boluses insulin 3 or more times daily before breakfast, before lunch, before dinner, and bedtime.  Patient requires frequent adjustments to their insulin treatment regimen on the basis of therapeutic CGM testing results.      "

## 2022-03-08 NOTE — PATIENT INSTRUCTIONS
Aspects of Diabetes we can improve:  Hemoglobin A1c Lab Results   Component Value Date    A1C 10.5 07/19/2021    A1C 10.3 02/18/2021    A1C 13.6 12/14/2020    A1C 7.2 05/21/2020    Goal range is under 8. Best is 6.5 to 7   Blood Pressure 160/84 Goal to keep less than 140/90   Tobacco  reports that he has never smoked. He has never used smokeless tobacco. Goal to abstain from tobacco   Aspirin yes Aspirin reduces risk of heart disease and stroke   ACE/ARB lisinopril These medications reduce risk of kidney disease   Cholesterol lipitor Statins reduce risk of heart disease and stroke   Eye Exam annual Annual diabetic eye exam   Healthy weight Body mass index is 31.56 kg/m . Goal BMI under 30, best is under 25.      -- I'm trying to exercise daily (goal at least 20 min/day) with moderate aerobic activity   -- Eat healthy (resources from ADA at http://www.diabetes.org/)   -- I'm taking good care of my feet. Consider seeing the Podiatrist   -- Check blood sugars as directed, record in log book and bring to every appointment   -- Goal sugar before breakfast: under 140   -- Goal sugar 2 hours after supper: under 170   -- Next diabetes lab draw: 3 months   -- Next diabetes office visit: 3 months     -- Send BP log to Dr. Pro on Thursday     Check your Blood Pressure   -- Sit in a chair, feet flat on the floor for 5 minutes   -- Avoid caffeine, exercise and smoking for 30 minutes before checking   -- Have your arm at the level of your heart   -- Make sure you have the correct size cuff   -- Write them down, bring your log book in to your appointment     -- Goal blood pressure 120/70   -- Learn about DASH Diet for dietary ways to reduce blood pressure  1. Google: NIH DASH diet  2. NIH site: https://www.nhlbi.nih.gov/health-topics/dash-eating-plan  3. PDF from NIH: https://www.nhlbi.nih.gov/files/docs/public/heart/new_dash.pdf   -- Work on 5% weight loss   -- Daily physical exercise (eg. 30 min brisk walk)

## 2022-03-08 NOTE — NURSING NOTE
"Chief Complaint   Patient presents with     Diabetes         Initial BP (!) 160/84   Pulse 84   Temp 98  F (36.7  C) (Tympanic)   Resp 16   Ht 1.937 m (6' 4.25\")   Wt 118.4 kg (261 lb)   SpO2 98%   BMI 31.56 kg/m   Estimated body mass index is 31.56 kg/m  as calculated from the following:    Height as of this encounter: 1.937 m (6' 4.25\").    Weight as of this encounter: 118.4 kg (261 lb).         Norma J. Gosselin, LPN   "

## 2022-05-31 ENCOUNTER — OFFICE VISIT (OUTPATIENT)
Dept: FAMILY MEDICINE | Facility: OTHER | Age: 57
End: 2022-05-31
Attending: NURSE PRACTITIONER
Payer: COMMERCIAL

## 2022-05-31 VITALS
DIASTOLIC BLOOD PRESSURE: 86 MMHG | WEIGHT: 260.4 LBS | HEIGHT: 76 IN | BODY MASS INDEX: 31.71 KG/M2 | OXYGEN SATURATION: 98 % | SYSTOLIC BLOOD PRESSURE: 138 MMHG | HEART RATE: 96 BPM | RESPIRATION RATE: 20 BRPM

## 2022-05-31 DIAGNOSIS — L97.521 DIABETIC ULCER OF TOE OF LEFT FOOT ASSOCIATED WITH TYPE 2 DIABETES MELLITUS, LIMITED TO BREAKDOWN OF SKIN (H): Primary | ICD-10-CM

## 2022-05-31 DIAGNOSIS — E11.621 DIABETIC ULCER OF TOE OF LEFT FOOT ASSOCIATED WITH TYPE 2 DIABETES MELLITUS, LIMITED TO BREAKDOWN OF SKIN (H): Primary | ICD-10-CM

## 2022-05-31 PROCEDURE — 99213 OFFICE O/P EST LOW 20 MIN: CPT | Performed by: NURSE PRACTITIONER

## 2022-05-31 RX ORDER — SULFAMETHOXAZOLE/TRIMETHOPRIM 800-160 MG
1 TABLET ORAL 2 TIMES DAILY
Qty: 20 TABLET | Refills: 0 | Status: SHIPPED | OUTPATIENT
Start: 2022-05-31 | End: 2022-06-10

## 2022-05-31 ASSESSMENT — PAIN SCALES - GENERAL: PAINLEVEL: NO PAIN (0)

## 2022-05-31 NOTE — PROGRESS NOTES
"  Assessment & Plan   Problem List Items Addressed This Visit    None     Visit Diagnoses     Diabetic ulcer of toe of left foot associated with type 2 diabetes mellitus, limited to breakdown of skin (H)    -  Primary    Relevant Medications    sulfamethoxazole-trimethoprim (BACTRIM DS) 800-160 MG tablet    Other Relevant Orders    Wound Care Referral         Diabetic foot ulcer with concerns for secondary bacterial infection.  Treated with Bactrim twice daily for 10 days and encouraged routine dressing changes.  He will continue to monitor for any changes and follow-up with any concerns.  Referral back to wound care nurse for further evaluation.  If he is unable to get into see her in the next week, I would like him to be seen by myself or another provider for a recheck.      No follow-ups on file.    [facility]    Jenna Martinez is a 57 year old who presents for the following health issues     History of Present Illness       Reason for visit:  Sore on lt foot  Symptom onset:  1-3 days ago      He comes in today for concerns about a sore in his left foot.  He noted a blister on his left toe yesterday, 2 days ago this was slightly red.  He has significant peripheral neuropathy still has no feeling.  He did get new boots about 1 month ago.  Denies any fevers.  Reports history of diabetic foot ulcers including right great toe amputation.  He also has had right heel diabetic ulcer that he has been working with wound care provider for management.  His last hemoglobin A1c was 7.7.  This is significantly improved from 10 months ago at 10.5.    Review of Systems   See above      Objective    /86   Pulse 96   Resp 20   Ht 1.937 m (6' 4.25\")   Wt 118.1 kg (260 lb 6.4 oz)   SpO2 98%   BMI 31.49 kg/m    Body mass index is 31.49 kg/m .  Physical Exam   GENERAL: healthy, alert and no distress  SKIN: Left fifth toe on outer edge with 1 x 1 cm open blister, erythema to " left fifth toe moving up to top of left foot on lateral aspect.  Slightly warm to touch.  No drainage.  NEURO: Normal strength and tone, mentation intact and speech normal  PSYCH: mentation appears normal, affect normal/bright

## 2022-05-31 NOTE — NURSING NOTE
Patient present today for sore on his left foot.    Medication Reconciliation Complete    Kayce Gonzales LPN  5/31/2022 2:45 PM

## 2022-06-08 ENCOUNTER — OFFICE VISIT (OUTPATIENT)
Dept: FAMILY MEDICINE | Facility: OTHER | Age: 57
End: 2022-06-08
Attending: STUDENT IN AN ORGANIZED HEALTH CARE EDUCATION/TRAINING PROGRAM
Payer: COMMERCIAL

## 2022-06-08 VITALS
HEIGHT: 76 IN | TEMPERATURE: 97.6 F | WEIGHT: 261.25 LBS | SYSTOLIC BLOOD PRESSURE: 136 MMHG | RESPIRATION RATE: 18 BRPM | HEART RATE: 84 BPM | BODY MASS INDEX: 31.81 KG/M2 | OXYGEN SATURATION: 98 % | DIASTOLIC BLOOD PRESSURE: 82 MMHG

## 2022-06-08 DIAGNOSIS — E10.621 DIABETIC ULCER OF TOE OF LEFT FOOT ASSOCIATED WITH TYPE 1 DIABETES MELLITUS, WITH FAT LAYER EXPOSED (H): Primary | ICD-10-CM

## 2022-06-08 DIAGNOSIS — L97.522 DIABETIC ULCER OF TOE OF LEFT FOOT ASSOCIATED WITH TYPE 1 DIABETES MELLITUS, WITH FAT LAYER EXPOSED (H): Primary | ICD-10-CM

## 2022-06-08 PROCEDURE — 99213 OFFICE O/P EST LOW 20 MIN: CPT | Performed by: STUDENT IN AN ORGANIZED HEALTH CARE EDUCATION/TRAINING PROGRAM

## 2022-06-08 ASSESSMENT — PAIN SCALES - GENERAL: PAINLEVEL: NO PAIN (0)

## 2022-06-08 NOTE — NURSING NOTE
Patient presents to clinic for follow up with wound to side of left foot pinky toe.  Area does weep and drain.  He does have neuropathy and can not feel his feet.    Medication Rec Complete  Liana Hastings LPN............6/8/2022 1:53 PM

## 2022-06-08 NOTE — PROGRESS NOTES
"  Assessment & Plan     Diabetic ulcer of toe of left foot associated with type 1 diabetes mellitus, with fat layer exposed (H)  Appears overlying cellulitis from ulcer has resolved with abx. Advised to complete abx course. Has follow up with wound care in 2 weeks. Wound bandaged today and given extra supplies. Discussed strict return precautions    Trino Lopez MD  [facility]    Jenna Martinez is a 57 year old who presents for the following health issues   Patient presents to clinic for follow up with wound to side of left foot pinky toe.  Area does weep and drain.  He does have neuropathy and can not feel his feet.    HPI     Follow up left 5th toe ulcer  Seen 8 days ago for diabetic ulcer with concern for bacterial infection. Started on bactrim BID for 10 days. Has 2 more days left of course  He thinks the redness has improved  Ulcer still there  No chills or fevers          Review of Systems   Constitutional, HEENT, cardiovascular, pulmonary, gi and gu systems are negative, except as otherwise noted.      Objective    /82 (BP Location: Right arm, Patient Position: Sitting, Cuff Size: Adult Large)   Pulse 84   Temp 97.6  F (36.4  C) (Tympanic)   Resp 18   Ht 1.937 m (6' 4.26\")   Wt 118.5 kg (261 lb 4 oz)   SpO2 98%   BMI 31.59 kg/m    Body mass index is 31.59 kg/m .  Physical Exam   GENERAL: healthy, alert and no distress  Left foot: fifth digit dorsal/lateral aspect with 1x 1 cm open wound, wet, with minimal erythema lining the wound. No spreading warmth or erythema                "

## 2022-06-22 ENCOUNTER — OFFICE VISIT (OUTPATIENT)
Dept: INTERNAL MEDICINE | Facility: OTHER | Age: 57
End: 2022-06-22
Attending: NURSE PRACTITIONER
Payer: COMMERCIAL

## 2022-06-22 VITALS
WEIGHT: 261.8 LBS | BODY MASS INDEX: 31.65 KG/M2 | HEART RATE: 90 BPM | RESPIRATION RATE: 16 BRPM | DIASTOLIC BLOOD PRESSURE: 78 MMHG | TEMPERATURE: 97.1 F | OXYGEN SATURATION: 99 % | SYSTOLIC BLOOD PRESSURE: 138 MMHG

## 2022-06-22 DIAGNOSIS — E11.621 DIABETIC ULCER OF TOE OF LEFT FOOT ASSOCIATED WITH TYPE 2 DIABETES MELLITUS, LIMITED TO BREAKDOWN OF SKIN (H): Primary | ICD-10-CM

## 2022-06-22 DIAGNOSIS — L97.521 DIABETIC ULCER OF TOE OF LEFT FOOT ASSOCIATED WITH TYPE 2 DIABETES MELLITUS, LIMITED TO BREAKDOWN OF SKIN (H): Primary | ICD-10-CM

## 2022-06-22 DIAGNOSIS — E11.42 DIABETIC PERIPHERAL NEUROPATHY (H): ICD-10-CM

## 2022-06-22 PROCEDURE — 99213 OFFICE O/P EST LOW 20 MIN: CPT | Performed by: NURSE PRACTITIONER

## 2022-06-22 ASSESSMENT — PAIN SCALES - GENERAL: PAINLEVEL: NO PAIN (0)

## 2022-06-22 NOTE — PROGRESS NOTES
ASSESSMENT:    ICD-10-CM    1. Diabetic ulcer of toe of left foot associated with type 2 diabetes mellitus, limited to breakdown of skin (H)  E11.621 Wound Care Referral    L97.521    2. Diabetic peripheral neuropathy (H)  E11.42        PLAN:  Recommend daily dressing change by cleansing with saline wash then applying medical grade honey followed by PolyMem and secure with Band-Aid or paper tape.  Do not leave wound open to air.  Follow-up in wound clinic in 2 weeks, sooner with concerns especially with any evidence of worsening or infection.    SUBJECTIVE:    Patient is here today for evaluation of diabetic ulcer of the left foot.  He has type 1 diabetes.  Most recent hemoglobin A1c at 7.7%.  He states that close to a month ago he developed swelling and redness at the fifth digit.  He believes this began when he developed a blister.  He does not recall his shoes fitting any tighter or any increased swelling in his feet.  He is not sure why this occurred.  He was treated with a course of Bactrim and infection resolved.  He has just been treating the wound with gauze dressing and changing every other.  There has been some yellow discharge on the dressing.    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)     Benign essential hypertension     Urinary retention     Insulin pump in place     Mixed hyperlipidemia     Type 1 diabetes mellitus with stage 3 chronic kidney disease (H)     Diabetic peripheral neuropathy (H)     Lower limb amputation, great toe, right (H)     Hx diabetic foot infection     Allergic conjunctivitis, bilateral     Moderate protein-calorie malnutrition (H)     Chronic kidney disease, stage IV (severe) (H)     PAST MEDICAL HISTORY:  Past Medical History:   Diagnosis Date     Carpal tunnel syndrome, bilateral      Diabetic foot ulcer (H)      Hyperlipidemia      Hypertension      Microalbuminuria      Neuropathy       Retinopathy      Trigger finger of both hands      Type 1 diabetes (H) 1982     Ulnar nerve entrapment at elbow, left      Urinary retention      SURGICAL HISTORY:  Past Surgical History:   Procedure Laterality Date     AMPUTATION Right     Great toe     CARPAL TUNNEL RELEASE RT/LT Bilateral      CLOSED REDUCTION, PERCUTANEOUS PINNING HIP      age 14, pinned for dislocation     COLONOSCOPY      Due 2022. has had two, history of polyps. recommend 5 year follow-up     ROTATOR CUFF REPAIR RT/LT Bilateral        SOCIAL HISTORY:  Social History     Socioeconomic History     Marital status:      Spouse name: Not on file     Number of children: Not on file     Years of education: Not on file     Highest education level: Not on file   Occupational History     Not on file   Tobacco Use     Smoking status: Never Smoker     Smokeless tobacco: Never Used     Tobacco comment: no ecig   Vaping Use     Vaping Use: Never used   Substance and Sexual Activity     Alcohol use: Yes     Comment: little     Drug use: Never     Sexual activity: Not Currently   Other Topics Concern     Not on file   Social History Narrative    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     - wife Jeanette    1 son- Will     Social Determinants of Health     Financial Resource Strain: Not on file   Food Insecurity: Not on file   Transportation Needs: Not on file   Physical Activity: Not on file   Stress: Not on file   Social Connections: Not on file   Intimate Partner Violence: Not on file   Housing Stability: Not on file     FAMILYHISTORY:  Family History   Problem Relation Age of Onset     Cancer Mother         ovarian     Prostate Cancer Father 65     Cancer Brother 50        had colon removed, ?colon cancer     Other - See Comments Son         lymphedema     CURRENT MEDICATIONS:   Current Outpatient Medications   Medication Sig Dispense Refill     aspirin (ASA) 81 MG tablet Take 1 tablet by mouth daily       atorvastatin  (LIPITOR) 40 MG tablet Take 1 tablet (40 mg) by mouth daily 90 tablet 3     blood glucose (NO BRAND SPECIFIED) lancets standard Dispense item covered by insurance. Test blood sugar 6 times daily. 100 each 11     blood glucose (NO BRAND SPECIFIED) test strip Dispense item covered by insurance. Test blood sugar 6 times daily. 100 strip 11     blood glucose monitoring (NO BRAND SPECIFIED) meter device kit Dispense option covered by insurance. Test blood sugar 6 times daily. 1 kit 11     Catheters [facility] Bard Magic3 14F ref #[id number] - use to self-catheterize 4 times daily.       Continuous Blood Gluc  (DEXCOM G6 ) RONALD        Continuous Blood Gluc Sensor (DEXCOM G6 SENSOR) MISC every 30 days       Continuous Blood Gluc Transmit (DEXCOM G6 TRANSMITTER) MISC        ergocalciferol (ERGOCALCIFEROL) 1.25 MG (63160 UT) capsule Take 1 capsule (50,000 Units) by mouth once a week 12 capsule 4     furosemide (LASIX) 20 MG tablet Take 2 tablets (40 mg) by mouth 2 times daily 90 tablet 3     glucagon 1 MG kit Inject 1 mg into the muscle as needed       hydrALAZINE (APRESOLINE) 50 MG tablet Take 1 tablet (50 mg) by mouth 2 times daily 180 tablet 3     insulin aspart (NOVOLOG VIAL) 100 UNITS/ML vial USE AS DIRECTED. MAX DAILY DOSE 70 UNITS 60 mL 11     lisinopril (ZESTRIL) 40 MG tablet Take 1 tablet (40 mg) by mouth daily 90 tablet 3     loratadine (CLARITIN) 10 MG tablet Take 10 mg by mouth daily       Multiple Vitamin (MULTI VITAMIN W/D-3) TABS Take 1 tablet by mouth daily       order for DME Automatic electronic sphygmomanometer including machine, cuff, tubing and all associated supplies. Essential Hypertension, chronic. Lifelong. 1 each 11     Riboflavin (VITAMIN B-2 PO) Take 50 mg by mouth once a week       tamsulosin (FLOMAX) 0.4 MG capsule Take 1 capsule (0.4 mg) by mouth daily 90 capsule 3     ALLERGIES:  Patient has no known allergies.    REVIEW OF SYSTEMS:  Review of Systems  Denies fever, chills, odorous and  purulent drainage, surrounding redness and warmth.  See HPI.    OBJECTIVE:  /78 (BP Location: Right arm, Patient Position: Sitting, Cuff Size: Adult Large)   Pulse 90   Temp 97.1  F (36.2  C) (Tympanic)   Resp 16   Wt 118.8 kg (261 lb 12.8 oz)   SpO2 99%   BMI 31.65 kg/m    EXAM:   Pleasant gentleman no acute distress.  Affect normal.  Alert and oriented x4.  Left foot DP PT 2+.  Trace edema of left lower extremity.  Left fifth digit without swelling.  No erythema or warmth.  He has 2 areas of ulceration medially and laterally.  The medial ulcer measures of 1.2 by 1.2 centimeter with 80% yellow soft slough and 20% pink wound bed.  Lateral ulcer measures 0.8 by 0.5 by 0.1 centimeter with pink wound bed.  Wounds are irrigated with Anasept, medical grade honey applied, PolyMem placed over wounds and secured with Band-Aid.      Inocencia Marquez, REBEKAH

## 2022-06-22 NOTE — NURSING NOTE
"Chief Complaint   Patient presents with     WOUND CARE       FOOD SECURITY SCREENING QUESTIONS  Hunger Vital Signs:  Within the past 12 months we worried whether our food would run out before we got money to buy more. Never  Within the past 12 months the food we bought just didn't last and we didn't have money to get more. Never  Rahel De La Rosa LPN 6/22/2022 8:29 AM      Initial /78 (BP Location: Right arm, Patient Position: Sitting, Cuff Size: Adult Large)   Pulse 90   Temp 97.1  F (36.2  C) (Tympanic)   Resp 16   Wt 118.8 kg (261 lb 12.8 oz)   SpO2 99%   BMI 31.65 kg/m   Estimated body mass index is 31.65 kg/m  as calculated from the following:    Height as of 6/8/22: 1.937 m (6' 4.26\").    Weight as of this encounter: 118.8 kg (261 lb 12.8 oz).  Medication Reconciliation: complete    Rahel De La Rosa LPN  "

## 2022-06-30 ENCOUNTER — TRANSFERRED RECORDS (OUTPATIENT)
Dept: HEALTH INFORMATION MANAGEMENT | Facility: OTHER | Age: 57
End: 2022-06-30

## 2022-07-12 ENCOUNTER — VIRTUAL VISIT (OUTPATIENT)
Dept: EDUCATION SERVICES | Facility: OTHER | Age: 57
End: 2022-07-12
Attending: INTERNAL MEDICINE
Payer: COMMERCIAL

## 2022-07-12 DIAGNOSIS — E10.59 TYPE 1 DIABETES MELLITUS WITH OTHER CIRCULATORY COMPLICATION (H): Chronic | ICD-10-CM

## 2022-07-12 PROCEDURE — G0108 DIAB MANAGE TRN  PER INDIV: HCPCS | Mod: TEL,95 | Performed by: REGISTERED NURSE

## 2022-07-12 NOTE — PROGRESS NOTES
"Juan is a 57 year old male who is being evaluated via a billable telephone visit.       The patient has been notified of following:      \"This telephone visit will be conducted via a call between you and your physician/provider. We have found that certain health care needs can be provided without the need for a physical exam.  This service lets us provide the care you need with a short phone conversation.  If a prescription is necessary we can send it directly to your pharmacy.  If lab work is needed we can place an order for that and you can then stop by our lab to have the test done at a later time.     Telephone visits are billed at different rates depending on your insurance coverage. During this emergency period, for some insurers they may be billed the same as an in-person visit.  Please reach out to your insurance provider with any questions.     If during the course of the call the physician/provider feels a telephone visit is not appropriate, you will not be charged for this service.\"     Patient has given verbal consent for Telephone visit?  YES     What phone number would you like to be contacted at? [phone]     How would you like to obtain your AVS? Mail a copy     Phone call duration: 45 minutes     Bindu Spring RN      Diabetes Self-Management Education & Support    Presents for: Insulin Pump and CGM Review    SUBJECTIVE/OBJECTIVE:  Presents for: Insulin Pump and CGM Review  Accompanied by: Self  Focus of Visit: CGM, Insulin Pump  Type of Pump visit: Pump Review  Diabetes type: Type 1  Date of diagnosis: 1/1983  Disease course: Stable  How confident are you filling out medical forms by yourself:: Extremely  Transportation concerns: No  Difficulty affording diabetes medication?: No  Difficulty affording diabetes testing supplies?: No  Cultural Influences/Ethnic Background:  Not  or   Patient states he was able to get more Dexcom CGM supplies and restarted monitoring glucose last " "Tuesday.  \"My readings are a lot better since I've been back on my Dexcom.\"    Diabetes Symptoms & Complications:  Fatigue: Yes  Neuropathy: Yes  Polydipsia: No  Polyphagia: No  Polyuria: No  Visual change: No  Slow healing wounds: Yes  Symptom course: Improving  Weight trend: Stable  CVA: No  Heart disease: No  Nephropathy: Yes  Foot ulcerations: No  Retinopathy: No    Patient Problem List and Family Medical History reviewed for relevant medical history, current medical status, and diabetes risk factors.    Vitals:  There were no vitals taken for this visit.  Estimated body mass index is 31.65 kg/m  as calculated from the following:    Height as of 6/8/22: 1.937 m (6' 4.26\").    Weight as of 6/22/22: 118.8 kg (261 lb 12.8 oz).   Last 3 BP:   BP Readings from Last 3 Encounters:   06/22/22 138/78   06/08/22 136/82   05/31/22 138/86       History   Smoking Status     Never Smoker   Smokeless Tobacco     Never Used     Comment: no ecig       Labs:  Lab Results   Component Value Date    A1C 7.7 03/08/2022    A1C 10.3 02/18/2021     Lab Results   Component Value Date    GLC 71 03/08/2022     04/08/2021     Lab Results   Component Value Date    LDL 94 07/19/2021    LDL 62 05/21/2020     HDL Cholesterol   Date Value Ref Range Status   05/21/2020 43 23 - 92 mg/dL Final     Direct Measure HDL   Date Value Ref Range Status   07/19/2021 46 23 - 92 mg/dL Final     Comment:     0-19 years:       Greater than or equal to 45 mg/dL   Low: Less than 40 mg/dL   Borderline low: 40-44 mg/dL     20 years and older:   Female: Greater than or equal to 50 mg/dL   Male:   Greater than or equal to 40 mg/dL        ]  GFR Estimate   Date Value Ref Range Status   03/08/2022 50 (L) >60 mL/min/1.73m2 Final     Comment:     Effective December 21, 2021 eGFRcr in adults is calculated using the 2021 CKD-EPI creatinine equation which includes age and gender (Gina villagomez al., NEJ, DOI: 10.1056/JQLPiy1879961)   04/08/2021 37 (L) >60 " mL/min/[1.73_m2] Final     GFR Estimate If Black   Date Value Ref Range Status   04/08/2021 44 (L) >60 mL/min/[1.73_m2] Final     Lab Results   Component Value Date    CR 1.59 03/08/2022    CR 1.91 04/08/2021     No results found for: MICROALBUMIN    Healthy Eating:  Healthy Eating Assessed Today: Yes  Cultural/Yarsani diet restrictions?: No  Meal planning/habits: Carb counting  Meals include: Breakfast, Lunch, Dinner  Beverages: Diet soda, Milk, Water  Has patient met with a dietitian in the past?: Yes    Being Active:  Being Active Assessed Today: Yes  Exercise:: Yes  Days per week of moderate to strenuous exercise (like a brisk walk): 6  On average, minutes per day of exercise at this level: 150 or greater  How intense was your typical exercise? : Light (like stretching or slow walking) (Light to moderate activity during working hours at [employer].)  Exercise Minutes per Week: 900  Barrier to exercise: None    Monitoring:  Monitoring Assessed Today: Yes  Blood Glucose Meter: CGM (Dexcom G6 CGM)  Blood glucose trend: Decreasing    Taking Medications:  Diabetes Medication(s)     Diabetic Other       glucagon 1 MG kit    Inject 1 mg into the muscle as needed    Insulin       insulin aspart (NOVOLOG VIAL) 100 UNITS/ML vial    USE AS DIRECTED. MAX DAILY DOSE 70 UNITS          Taking Medication Assessed Today: Yes  Current Treatments: Insulin Pump  Given by: Patient  Injection/Infusion sites: Abdomen  Problems taking diabetes medications regularly?: No  Diabetes medication side effects?: No    Problem Solving:  Problem Solving Assessed Today: Yes  Is the patient at risk for hypoglycemia?: Yes  Hypoglycemia Frequency: Weekly  Hypoglycemia Treatment: Candy  Patient carries a carbohydrate source: Yes (Can of regular pop, cookie, sweet snack.)  Medical ID: No    Hypoglycemia symptoms  Feeling shaky: Yes (Weak and blurry vision and shaky.)    Hypoglycemia Complications  Blackouts: No  Hospitalization:  No  Nocturnal hypoglycemia: Yes  Required assistance: No  Required glucagon injection: No  Seizures: No    Reducing Risks:  Reducing Risks Assessed Today: Yes  Diabetes Risks: Age over 45 years, Family History  CAD Risks: Hypertension, Male sex, Diabetes Mellitus, Family history  Has dilated eye exam at least once a year?: Yes  Sees dentist every 6 months?: No  Feet checked by healthcare provider in the last year?: Yes    Healthy Coping:  Healthy Coping Assessed Today: Yes  Emotional response to diabetes: Acceptance  Informal Support system:: Spouse, Family  Stage of change: ACTION (Actively working towards change)  Support resources: Offerings in Clinic Communities  Patient Activation Measure Survey Score:  No flowsheet data found.    Diabetes knowledge and skills assessment:   Patient is knowledgeable in diabetes management concepts related to: Being Active, Monitoring and Taking Medication    Patient needs further education on the following diabetes management concepts: Problem Solving and Reducing Risks    Based on learning assessment above, most appropriate setting for further diabetes education would be: Group class or Individual setting.      REPORTS:    ASSESSMENT    Discussed D5 Health Goals and patient has met 4 of 5 goals at this time.  (BP less than 140/90, ASA therapy as recommended, statin therapy as recommended, A1c less than 8%, tobacco free).     CGM download glucose results:    Report shows 148 mg/dl average sensor glucose over the past week.    Standard deviation (SD) is +- 42 mg/dl. Goal for SD is +-50 mg/dl or lower.      Glucose 76% in target (70 - 180), 21% high (181 - 249), 2% very high (250 - 400), 1% low (56 - 69) and 0% very low (39 - 55).      No significant patterns of highs or lows found.    CGM report 6/28/22 - 7/4/22 shows extreme hyperglycemia (68% readings above 250 mg/dL).  Patient shares the reason for most recent improvement (2% readings above 250 mg/dL) was due to restarting on  "his pump again with Dexcom G6 CGM.  He also shares about his foot infection and how \"readings were elevated and didn't seem to want to come down.\"      Insulin Pump download:    Changing infusion set every 3 days.     Average daily carbohydrates: 253 grams  Average total daily insulin: 58.4 units  Basal: 44%  Bolus: 56%    INTERVENTION    Plan:    1.  Due to glucose elevation trend after meals and basal/bolus percentage split 44/56, recommend basal increase from 28.4 to 30.8 units.      2.  Please follow up for continued DSMT in one month, or as needed.          Insulin Pump Information  Insulin Pump Brand: Tandem  Infusion Set: Tandem  Tandem Infusion Set: Auto Soft XC  Does patient have an insulin multiple daily injection back-up plan?: Yes    Education provided today on:  AADE Self-Care Behaviors:  Monitoring: Help with supplies, troubleshooting with insurance to help keep continued use of DM supplies.  Taking Medication: importance of diabetes care and insulin need.  Problem Solving: high blood glucose - causes, signs/symptoms, treatment and prevention, low blood glucose - causes, signs/symptoms, treatment and prevention and carrying a carbohydrate source at all times  Reducing Risks: major complications of diabetes, prevention, early diagnostic measures and treatment of complications, foot care, aspirin therapy, A1C - goals, relating to blood glucose levels, how often to check, lipids levels and goals and blood pressure and goals  Healthy Coping: benefits of making appropriate lifestyle changes and utilize support systems    Education specific to insulin pump provided today on:   importance of changing infusion set every 3 days, importance of bolusing before meals, importance of counting carbohydrates accurately and how to use Activity feature to raise glucose target to 140 - 160 during active times, for example, at work.    Opportunities for ongoing education and support in diabetes-self management were " discussed.    Pt verbalized understanding of concepts discussed and recommendations provided today.       Education Materials Provided:  Tandem Quick Guide, Activity Feature  D5 Health Goals    PLAN  See Patient Instructions for co-developed, patient-stated behavior change goals.  AVS printed and provided to patient today. See Follow-Up section for recommended follow-up.    Bindu Spring RN, BSN, [facility]  7/12/2022 9:15 AM   Time Spent: 45 minutes  Encounter Type: Individual    Any diabetes medication dose changes were made via the CDE Protocol and Collaborative Practice Agreement with the patient's primary care provider. A copy of this encounter was shared with the provider.

## 2022-07-19 ENCOUNTER — OFFICE VISIT (OUTPATIENT)
Dept: PEDIATRICS | Facility: OTHER | Age: 57
End: 2022-07-19
Attending: INTERNAL MEDICINE
Payer: COMMERCIAL

## 2022-07-19 VITALS
WEIGHT: 251.9 LBS | HEART RATE: 78 BPM | RESPIRATION RATE: 18 BRPM | DIASTOLIC BLOOD PRESSURE: 70 MMHG | OXYGEN SATURATION: 97 % | SYSTOLIC BLOOD PRESSURE: 134 MMHG | BODY MASS INDEX: 30.67 KG/M2 | HEIGHT: 76 IN | TEMPERATURE: 98.3 F

## 2022-07-19 DIAGNOSIS — N18.4 CHRONIC KIDNEY DISEASE, STAGE IV (SEVERE) (H): ICD-10-CM

## 2022-07-19 DIAGNOSIS — E11.42 DIABETIC PERIPHERAL NEUROPATHY (H): ICD-10-CM

## 2022-07-19 DIAGNOSIS — L97.524 DIABETIC ULCER OF TOE OF LEFT FOOT ASSOCIATED WITH TYPE 1 DIABETES MELLITUS, WITH NECROSIS OF BONE (H): ICD-10-CM

## 2022-07-19 DIAGNOSIS — D63.1 ANEMIA DUE TO STAGE 4 CHRONIC KIDNEY DISEASE (H): ICD-10-CM

## 2022-07-19 DIAGNOSIS — E78.2 MIXED HYPERLIPIDEMIA: ICD-10-CM

## 2022-07-19 DIAGNOSIS — E10.59 TYPE 1 DIABETES MELLITUS WITH OTHER CIRCULATORY COMPLICATION (H): Chronic | ICD-10-CM

## 2022-07-19 DIAGNOSIS — Z09 HOSPITAL DISCHARGE FOLLOW-UP: Primary | ICD-10-CM

## 2022-07-19 DIAGNOSIS — S98.132A AMPUTATION OF FIFTH TOE OF LEFT FOOT (H): ICD-10-CM

## 2022-07-19 DIAGNOSIS — E10.621 DIABETIC ULCER OF TOE OF LEFT FOOT ASSOCIATED WITH TYPE 1 DIABETES MELLITUS, WITH NECROSIS OF BONE (H): ICD-10-CM

## 2022-07-19 DIAGNOSIS — N18.4 ANEMIA DUE TO STAGE 4 CHRONIC KIDNEY DISEASE (H): ICD-10-CM

## 2022-07-19 PROCEDURE — 99215 OFFICE O/P EST HI 40 MIN: CPT | Mod: 25 | Performed by: INTERNAL MEDICINE

## 2022-07-19 PROCEDURE — 91305 COVID-19,PF,PFIZER (12+ YRS): CPT | Performed by: INTERNAL MEDICINE

## 2022-07-19 PROCEDURE — 0054A COVID-19,PF,PFIZER (12+ YRS): CPT | Performed by: INTERNAL MEDICINE

## 2022-07-19 RX ORDER — AMOXICILLIN AND CLAVULANATE POTASSIUM 500; 125 MG/1; MG/1
1 TABLET, FILM COATED ORAL
COMMUNITY
Start: 2022-07-18 | End: 2022-08-01

## 2022-07-19 ASSESSMENT — PATIENT HEALTH QUESTIONNAIRE - PHQ9
SUM OF ALL RESPONSES TO PHQ QUESTIONS 1-9: 2
10. IF YOU CHECKED OFF ANY PROBLEMS, HOW DIFFICULT HAVE THESE PROBLEMS MADE IT FOR YOU TO DO YOUR WORK, TAKE CARE OF THINGS AT HOME, OR GET ALONG WITH OTHER PEOPLE: NOT DIFFICULT AT ALL
SUM OF ALL RESPONSES TO PHQ QUESTIONS 1-9: 2

## 2022-07-19 ASSESSMENT — PAIN SCALES - GENERAL: PAINLEVEL: NO PAIN (0)

## 2022-07-19 NOTE — PATIENT INSTRUCTIONS
-- Follow-up with Carlos Nowak for foot check suture removal   -- Complete antibiotics   -- [facility]  -- Eat yogurt 1-2 times per day while on antibiotics (and for a few weeks after) to reduce the chances of diarrhea

## 2022-07-19 NOTE — PROGRESS NOTES
Assessment & Plan   1. Hospital discharge follow-up      2. Diabetic ulcer of toe of left foot associated with type 1 diabetes mellitus, with necrosis of bone (H)  3. Amputation of fifth toe of left foot (H)  In order to try to prevent this in the future I think he really needs to get a different set of footwear.  He typically wears steel toed boots for his job.  I recommend that he visit with the orthotist to obtain diabetic shoes and inserts to try to reduce rubbing on the toes in particular and try to reduce the chance of a further infection.  - Miscellaneous Order for DME - ONLY FOR DME    4. Type 1 diabetes mellitus with other circulatory complication (H)  - Miscellaneous Order for DME - ONLY FOR DME  - Basic metabolic panel; Future  - Hemoglobin A1c; Future    5. Chronic kidney disease, stage IV (severe) (H)      6. Diabetic peripheral neuropathy (H)  - Miscellaneous Order for DME - ONLY FOR DME    7. Anemia due to stage 4 chronic kidney disease (H)  - CBC with platelets; Future    8. Mixed hyperlipidemia  - Lipid Profile; Future      Mr. [facility] was recently hospitalized for diabetic foot infection with sepsis, appears to be doing well since discharge.  Discharge summary and recommendations for outpatient provider are reviewed. Based on what occurred in the visit today:  Previous medication(s) were discontinued or altered? No  Previous medication(s) were suspended pending consultation? No  New medication(s) started? No     -- Medication reconciliation and management was performed today    Patient Instructions    -- Follow-up with Carlos Nowak for foot check suture removal   -- Complete antibiotics   -- Follow-up with [facility]  -- Eat yogurt 1-2 times per day while on antibiotics (and for a few weeks after) to reduce the chances of diarrhea        No follow-ups on file.    Signed, Tello Pro MD, FAAP, FACP  Internal Medicine & Pediatrics    Subjective   Clemente [facility] is a 57 year old male who  "presents for hospital discharge follow-up.    Hospital: [facility]  Date of discharge: 7/5/22  Date of post discharge contact: 7/12/22    He recently presented to the [place] emergency department.  He was found to have sepsis secondary to a diabetic foot infection.  Was transferred to [facility] for expert consultation.  Was seen by podiatry and infectious diseases.  Found to have MSSA and treated with IV antibiotics.  He had his left fifth toe amputated by the podiatrist.  He had an uncomplicated postoperative course and was discharged.  He has had follow-up with diabetes education as well as primary care in [place] to follow-up on the surgical site.  He will be following up there again at 2 weeks for another wound check and removal of sutures.  His sugars have come down drastically now that the infection is being treated.    Objective   Vitals: /70 (BP Location: Right arm, Patient Position: Sitting, Cuff Size: Adult Large)   Pulse 78   Temp 98.3  F (36.8  C) (Tympanic)   Resp 18   Ht 1.93 m (6' 4\")   Wt 114.3 kg (251 lb 14.4 oz)   SpO2 97%   BMI 30.66 kg/m        Review and Analysis of Data   I personally reviewed the following:  External notes: Yes Discharge summary from [facility] reviewed.  Clinic note from [place] reviewed.  Results: Yes Laboratory data from ER and hospital reviewed  Use of an independent historian: No  Independent review of a test performed by another physician: No  Discussion of management with another physician: No  Moderate risk of morbidity from additional diagnostic testing and/or treatment.    Billing:   Type of Medical Decision Making Face-to-Face Visit within 7 Days Face-to-Face Visit within 14 days   Moderate Complexity [id number]   High Complexity [id number]         "

## 2022-07-19 NOTE — NURSING NOTE
"Chief Complaint   Patient presents with     Follow Up toe amputation         Initial /70 (BP Location: Right arm, Patient Position: Sitting, Cuff Size: Adult Large)   Pulse 78   Temp 98.3  F (36.8  C) (Tympanic)   Resp 18   Ht 1.93 m (6' 4\")   Wt 114.3 kg (251 lb 14.4 oz)   SpO2 97%   BMI 30.66 kg/m   Estimated body mass index is 30.66 kg/m  as calculated from the following:    Height as of this encounter: 1.93 m (6' 4\").    Weight as of this encounter: 114.3 kg (251 lb 14.4 oz).       FOOD SECURITY SCREENING QUESTIONS:    The next two questions are to help us understand your food security.  If you are feeling you need any assistance in this area, we have resources available to support you today.    Hunger Vital Signs:  Within the past 12 months we worried whether our food would run out before we got money to buy more. Never  Within the past 12 months the food we bought just didn't last and we didn't have money to get more. Never    Advance Care Directive on file? no      Medication reconciliation complete.      Bautista Candelario,on 7/19/2022 at 11:39 AM        "

## 2022-08-12 ENCOUNTER — TRANSFERRED RECORDS (OUTPATIENT)
Dept: HEALTH INFORMATION MANAGEMENT | Facility: CLINIC | Age: 57
End: 2022-08-12

## 2022-08-29 DIAGNOSIS — N18.31 STAGE 3A CHRONIC KIDNEY DISEASE (H): ICD-10-CM

## 2022-08-31 RX ORDER — ERGOCALCIFEROL 1.25 MG/1
CAPSULE, LIQUID FILLED ORAL
Qty: 12 CAPSULE | Refills: 4 | OUTPATIENT
Start: 2022-08-31

## 2022-08-31 NOTE — TELEPHONE ENCOUNTER
[facility] sent Rx request for the following:    Vitamin D (Ergocalciferol) 1.25 MG (07490 UT) Oral Capsule  Last Prescription Date:   3/8/22  Last Fill Qty/Refills:         12, R-4    Last Office Visit:              7/19/22   Future Office visit:           None     Redundant refill request refused: Too soon:  Jennifer Stark RN on 8/31/2022 at 1:22 PM

## 2022-10-14 ENCOUNTER — TRANSFERRED RECORDS (OUTPATIENT)
Dept: HEALTH INFORMATION MANAGEMENT | Facility: OTHER | Age: 57
End: 2022-10-14

## 2022-10-14 LAB — RETINOPATHY: POSITIVE

## 2022-11-15 ENCOUNTER — OFFICE VISIT (OUTPATIENT)
Dept: PEDIATRICS | Facility: OTHER | Age: 57
End: 2022-11-15
Attending: INTERNAL MEDICINE
Payer: COMMERCIAL

## 2022-11-15 VITALS
HEIGHT: 76 IN | HEART RATE: 84 BPM | DIASTOLIC BLOOD PRESSURE: 45 MMHG | SYSTOLIC BLOOD PRESSURE: 82 MMHG | BODY MASS INDEX: 29.65 KG/M2 | RESPIRATION RATE: 18 BRPM | OXYGEN SATURATION: 99 % | WEIGHT: 243.5 LBS | TEMPERATURE: 97.8 F

## 2022-11-15 DIAGNOSIS — N18.4 CHRONIC KIDNEY DISEASE, STAGE IV (SEVERE) (H): ICD-10-CM

## 2022-11-15 DIAGNOSIS — I10 BENIGN ESSENTIAL HYPERTENSION: ICD-10-CM

## 2022-11-15 DIAGNOSIS — I95.1 ORTHOSTATIC HYPOTENSION: Primary | ICD-10-CM

## 2022-11-15 DIAGNOSIS — E78.2 MIXED HYPERLIPIDEMIA: Chronic | ICD-10-CM

## 2022-11-15 DIAGNOSIS — R63.4 UNINTENDED WEIGHT LOSS: ICD-10-CM

## 2022-11-15 DIAGNOSIS — E10.59 TYPE 1 DIABETES MELLITUS WITH OTHER CIRCULATORY COMPLICATION (H): ICD-10-CM

## 2022-11-15 LAB
ANION GAP SERPL CALCULATED.3IONS-SCNC: 11 MMOL/L (ref 7–15)
BUN SERPL-MCNC: 45.3 MG/DL (ref 6–20)
CALCIUM SERPL-MCNC: 10.4 MG/DL (ref 8.6–10)
CHLORIDE SERPL-SCNC: 96 MMOL/L (ref 98–107)
CHOLEST SERPL-MCNC: 133 MG/DL
CREAT SERPL-MCNC: 2.29 MG/DL (ref 0.67–1.17)
DEPRECATED HCO3 PLAS-SCNC: 25 MMOL/L (ref 22–29)
GFR SERPL CREATININE-BSD FRML MDRD: 32 ML/MIN/1.73M2
GLUCOSE SERPL-MCNC: 455 MG/DL (ref 70–99)
HBA1C MFR BLD: 10.8 % (ref 4–6.2)
HDLC SERPL-MCNC: 47 MG/DL
LDLC SERPL CALC-MCNC: 78 MG/DL
NONHDLC SERPL-MCNC: 86 MG/DL
POTASSIUM SERPL-SCNC: 5.2 MMOL/L (ref 3.4–5.3)
SODIUM SERPL-SCNC: 132 MMOL/L (ref 136–145)
TRIGL SERPL-MCNC: 42 MG/DL

## 2022-11-15 PROCEDURE — 99214 OFFICE O/P EST MOD 30 MIN: CPT | Performed by: INTERNAL MEDICINE

## 2022-11-15 PROCEDURE — 80048 BASIC METABOLIC PNL TOTAL CA: CPT | Mod: ZL | Performed by: INTERNAL MEDICINE

## 2022-11-15 PROCEDURE — 80061 LIPID PANEL: CPT | Mod: ZL | Performed by: INTERNAL MEDICINE

## 2022-11-15 PROCEDURE — 36415 COLL VENOUS BLD VENIPUNCTURE: CPT | Mod: ZL | Performed by: INTERNAL MEDICINE

## 2022-11-15 PROCEDURE — 83036 HEMOGLOBIN GLYCOSYLATED A1C: CPT | Mod: ZL | Performed by: INTERNAL MEDICINE

## 2022-11-15 RX ORDER — LISINOPRIL 20 MG/1
20 TABLET ORAL DAILY
Qty: 90 TABLET | Refills: 3 | Status: SHIPPED | OUTPATIENT
Start: 2022-11-15 | End: 2023-08-10

## 2022-11-15 ASSESSMENT — PAIN SCALES - GENERAL: PAINLEVEL: MILD PAIN (2)

## 2022-11-15 NOTE — PATIENT INSTRUCTIONS
-- Stop hydralazine   -- Stop furosemide   -- Reduce lisinopril to 20 mg   -- Daily BP check, goal 120-140 top number   -- Drink G-zero or other sugar free electrolyte beverage    Check your Blood Pressure   -- Sit in a chair, feet flat on the floor for 5 minutes   -- Avoid caffeine, exercise and smoking for 30 minutes before checking   -- Have your arm at the level of your heart   -- Make sure you have the correct size cuff   -- Write them down, bring your log book in to your appointment     -- Goal blood pressure 120/70   -- Learn about DASH Diet for dietary ways to reduce blood pressure  Google: NIH DASH diet  NIH site: https://www.nhlbi.nih.gov/health-topics/dash-eating-plan  PDF from [facility]: https://www.nhlbi.nih.gov/files/docs/public/heart/new_dash.pdf   -- Work on 5% weight loss   -- Daily physical exercise (eg. 30 min brisk walk)

## 2022-11-15 NOTE — PROGRESS NOTES
Assessment & Plan       ICD-10-CM    1. Orthostatic hypotension  I95.1 Basic metabolic panel     Basic metabolic panel      2. Chronic kidney disease, stage IV (severe) (H)  N18.4       3. Benign essential hypertension  I10 lisinopril (ZESTRIL) 20 MG tablet      4. Unintended weight loss  R63.4       5. 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 Mixed hyperlipidemia  E78.2 Lipid Profile     Lipid Profile        His blood pressures are very low today.  He appears to have a 20 pound unintended weight loss.  I presume this is from uncontrolled diabetes with glucosuria.  We discussed the possibility of occult malignancy.  We also discussed the possibility of emerging sepsis however no other signs of infection are present at this time.  Warning signs discussed and prompt repeat evaluation recommended if symptoms worsen.  Otherwise a drastic reduction to antihypertensive regimen, close home blood pressure monitoring and low threshold to return if symptoms worsen.  I recommended against operating heavy machinery for the next several days.  I expect he will start feeling better in the next several days.      Patient Instructions    -- Stop hydralazine   -- Stop furosemide   -- Reduce lisinopril to 20 mg   -- Daily BP check, goal 120-140 top number   -- Drink G-zero or other sugar free electrolyte beverage    Check your Blood Pressure   -- Sit in a chair, feet flat on the floor for 5 minutes   -- Avoid caffeine, exercise and smoking for 30 minutes before checking   -- Have your arm at the level of your heart   -- Make sure you have the correct size cuff   -- Write them down, bring your log book in to your appointment     -- Goal blood pressure 120/70   -- Learn about DASH Diet for dietary ways to reduce blood pressure  1. Google: NIH DASH diet  2. [facility] site:  "https://www.nhlbi.nih.gov/health-topics/dash-eating-plan  3. PDF from [facility]: https://www.nhlbi.nih.gov/files/docs/public/heart/new_dash.pdf   -- Work on 5% weight loss   -- Daily physical exercise (eg. 30 min brisk walk)         Return in about 2 weeks (around 11/29/2022), or if symptoms worsen or fail to improve, for hypertension, diabetes.    Signed, Tello Pro MD, FAAP, FACP  Internal Medicine & Pediatrics    Subjective   Clemente Graham is a 57 year old male who presents for feeling ill.  Over the last week he has been feeling ill.  He feels discomfort over the back of the neck, shoulder area where he feels like he is tensing up.  He gets spells of dizziness that seem to come and go.  Associated with a headache that comes and goes.  He feels off but does not know why.  He does not think he lost any weight.  His blood sugars have been elevated.  His Dexcom is not working.  He has not checked many blood sugars.  He does not think they are in the 300s.    Objective   Vitals: BP (!) 82/45   Pulse 84   Temp 97.8  F (36.6  C) (Tympanic)   Resp 18   Ht 1.93 m (6' 4\")   Wt 110.5 kg (243 lb 8 oz)   SpO2 99%   BMI 29.64 kg/m      General: well appearing  CV: Regular rate and rhythm, no murmur, rub or gallop  Pulm: Clear to auscultation bilaterally, no wheezing, rales or rhonchi  Psychiatry: Normal affect and insight.    Review and Analysis of Data   I personally reviewed the following:  External notes: Yes Most recent diabetic lab work is reviewed  Results: No  Use of an independent historian: No  Independent review of a test performed by another physician: No  Discussion of management with another physician: No  Moderate risk of morbidity from additional diagnostic testing and/or treatment.      "

## 2022-11-15 NOTE — NURSING NOTE
Patient presents today for diabetic check up. Patient has been having neck and shoulder pain that has been causing headaches the last few weeks. Patient has been seen by the chiropractor with no improvement,    Medication Reconciliation Complete    Kayce Gonzales LPN  11/15/2022 11:24 AM

## 2022-11-29 ENCOUNTER — OFFICE VISIT (OUTPATIENT)
Dept: PEDIATRICS | Facility: OTHER | Age: 57
End: 2022-11-29
Attending: INTERNAL MEDICINE
Payer: COMMERCIAL

## 2022-11-29 VITALS
DIASTOLIC BLOOD PRESSURE: 70 MMHG | OXYGEN SATURATION: 99 % | BODY MASS INDEX: 31.05 KG/M2 | HEIGHT: 76 IN | TEMPERATURE: 97.5 F | WEIGHT: 255 LBS | RESPIRATION RATE: 20 BRPM | SYSTOLIC BLOOD PRESSURE: 134 MMHG | HEART RATE: 78 BPM

## 2022-11-29 DIAGNOSIS — I95.1 ORTHOSTATIC HYPOTENSION: ICD-10-CM

## 2022-11-29 DIAGNOSIS — N18.32 TYPE 1 DIABETES MELLITUS WITH STAGE 3B CHRONIC KIDNEY DISEASE (H): Primary | Chronic | ICD-10-CM

## 2022-11-29 DIAGNOSIS — Z23 NEED FOR VACCINATION: ICD-10-CM

## 2022-11-29 DIAGNOSIS — E10.22 TYPE 1 DIABETES MELLITUS WITH STAGE 3B CHRONIC KIDNEY DISEASE (H): Primary | Chronic | ICD-10-CM

## 2022-11-29 DIAGNOSIS — N18.32 ACUTE RENAL FAILURE SUPERIMPOSED ON STAGE 3B CHRONIC KIDNEY DISEASE, UNSPECIFIED ACUTE RENAL FAILURE TYPE (H): ICD-10-CM

## 2022-11-29 DIAGNOSIS — N17.9 ACUTE RENAL FAILURE SUPERIMPOSED ON STAGE 3B CHRONIC KIDNEY DISEASE, UNSPECIFIED ACUTE RENAL FAILURE TYPE (H): ICD-10-CM

## 2022-11-29 LAB
ANION GAP SERPL CALCULATED.3IONS-SCNC: 5 MMOL/L (ref 7–15)
BUN SERPL-MCNC: 20.1 MG/DL (ref 6–20)
CALCIUM SERPL-MCNC: 8.9 MG/DL (ref 8.6–10)
CHLORIDE SERPL-SCNC: 107 MMOL/L (ref 98–107)
CREAT SERPL-MCNC: 1.28 MG/DL (ref 0.67–1.17)
DEPRECATED HCO3 PLAS-SCNC: 27 MMOL/L (ref 22–29)
GFR SERPL CREATININE-BSD FRML MDRD: 65 ML/MIN/1.73M2
GLUCOSE SERPL-MCNC: 222 MG/DL (ref 70–99)
POTASSIUM SERPL-SCNC: 4.7 MMOL/L (ref 3.4–5.3)
SODIUM SERPL-SCNC: 139 MMOL/L (ref 136–145)

## 2022-11-29 PROCEDURE — 0124A COVID-19 VACCINE BIVALENT BOOSTER 12+ (PFIZER): CPT | Performed by: INTERNAL MEDICINE

## 2022-11-29 PROCEDURE — 82374 ASSAY BLOOD CARBON DIOXIDE: CPT | Mod: ZL | Performed by: INTERNAL MEDICINE

## 2022-11-29 PROCEDURE — 90682 RIV4 VACC RECOMBINANT DNA IM: CPT | Performed by: INTERNAL MEDICINE

## 2022-11-29 PROCEDURE — 90471 IMMUNIZATION ADMIN: CPT | Performed by: INTERNAL MEDICINE

## 2022-11-29 PROCEDURE — 99213 OFFICE O/P EST LOW 20 MIN: CPT | Mod: 25 | Performed by: INTERNAL MEDICINE

## 2022-11-29 PROCEDURE — 82310 ASSAY OF CALCIUM: CPT | Mod: ZL | Performed by: INTERNAL MEDICINE

## 2022-11-29 PROCEDURE — 36415 COLL VENOUS BLD VENIPUNCTURE: CPT | Mod: ZL | Performed by: INTERNAL MEDICINE

## 2022-11-29 PROCEDURE — 91312 COVID-19 VACCINE BIVALENT BOOSTER 12+ (PFIZER): CPT | Performed by: INTERNAL MEDICINE

## 2022-11-29 ASSESSMENT — PAIN SCALES - GENERAL: PAINLEVEL: NO PAIN (0)

## 2022-11-29 NOTE — PROGRESS NOTES
Assessment & Plan   1. Type 1 diabetes mellitus with stage 3b chronic kidney disease (H)  His A1c has been significantly elevated.  It appears that when his Dexcom is working this is a great help to him and if not available or working his diabetes is significantly uncontrolled.  I encouraged him to maintain close connection with [facility]  diabetes education to work on maintaining a good supply of diabetes supplies including Dexcom and CGM supplies.  I strongly encouraged ongoing use of Dexcom and CGM.  - Basic metabolic panel; Future  - Hemoglobin A1c; Future  - Basic metabolic panel; Future  - Basic metabolic panel    2. Orthostatic hypotension  Blood pressures have normalized.  His blood pressure here today is very good.  I wonder if his home monitor may be reading falsely high.  Recommended he schedule a nurse only blood pressure check so they can check his blood pressure manually and with his cuff.  - Basic metabolic panel; Future  - Basic metabolic panel    3. Need for vaccination  - INFLUENZA VACCINE 50-64 OR 18-64 W/EGG ALLERGY (FLUBLOK)  - COVID-19 VACCINE BIVALENT BOOSTER 12+ (PFIZER)    4. Acute renal failure superimposed on stage 3b chronic kidney disease, unspecified acute renal failure type (H)      Patient Instructions    -- No change to medicine today   -- Check BP a few times a week, let Dr Pro know if you are going over 140 more often than not   -- Lab today     -- Lab in 3 months   -- Dr. Pro in 3 months      Return in about 3 months (around 2/28/2023) for med management.    Signed, Tello Pro MD, FAAP, FACP  Internal Medicine & Pediatrics    Subjective   Clemente Graham is a 57 year old male who presents for blood pressure recheck.  Since our last visit he stopped taking the medications as recommended.  He feels a lot better.  He is not having the dizzy spells anymore.  His blood pressure this morning at home was 158/90.  He feels a lot better than he used to.  He got his Dexcom supplies  "again.  His blood sugars are usually a lot better controlled 1 he can utilize the Dexcom.  He does not do fingerstick checking when his Dexcom is not working.    Objective   Vitals: /70   Pulse 78   Temp 97.5  F (36.4  C) (Tympanic)   Resp 20   Ht 1.93 m (6' 4\")   Wt 115.7 kg (255 lb)   SpO2 99%   BMI 31.04 kg/m        Review and Analysis of Data   I personally reviewed the following:  External notes: No  Results: Yes Most recent lab work reviewed  Use of an independent historian: No  Independent review of a test performed by another physician: No  Discussion of management with another physician: No  Moderate risk of morbidity from additional diagnostic testing and/or treatment.      "

## 2022-11-29 NOTE — NURSING NOTE
"Chief Complaint   Patient presents with     Diabetes     Hypertension         Initial /70   Pulse 78   Temp 97.5  F (36.4  C) (Tympanic)   Resp 20   Ht 1.93 m (6' 4\")   Wt 115.7 kg (255 lb)   SpO2 99%   BMI 31.04 kg/m   Estimated body mass index is 31.04 kg/m  as calculated from the following:    Height as of this encounter: 1.93 m (6' 4\").    Weight as of this encounter: 115.7 kg (255 lb).         Norma J. Gosselin, LPN   "

## 2022-11-29 NOTE — PATIENT INSTRUCTIONS
-- No change to medicine today   -- Check BP a few times a week, let Dr Pro know if you are going over 140 more often than not   -- Lab today     -- Lab in 3 months   -- Dr. Pro in 3 months

## 2023-02-03 ENCOUNTER — OFFICE VISIT (OUTPATIENT)
Dept: PEDIATRICS | Facility: OTHER | Age: 58
End: 2023-02-03
Attending: INTERNAL MEDICINE
Payer: COMMERCIAL

## 2023-02-03 VITALS
OXYGEN SATURATION: 98 % | BODY MASS INDEX: 33.09 KG/M2 | HEART RATE: 81 BPM | RESPIRATION RATE: 20 BRPM | TEMPERATURE: 97.9 F | DIASTOLIC BLOOD PRESSURE: 82 MMHG | SYSTOLIC BLOOD PRESSURE: 138 MMHG | WEIGHT: 271.7 LBS | HEIGHT: 76 IN

## 2023-02-03 DIAGNOSIS — N18.32 TYPE 1 DIABETES MELLITUS WITH STAGE 3B CHRONIC KIDNEY DISEASE (H): Primary | ICD-10-CM

## 2023-02-03 DIAGNOSIS — N18.4 ANEMIA DUE TO STAGE 4 CHRONIC KIDNEY DISEASE (H): ICD-10-CM

## 2023-02-03 DIAGNOSIS — E78.2 MIXED HYPERLIPIDEMIA: ICD-10-CM

## 2023-02-03 DIAGNOSIS — N18.4 CHRONIC KIDNEY DISEASE, STAGE IV (SEVERE) (H): ICD-10-CM

## 2023-02-03 DIAGNOSIS — E10.22 TYPE 1 DIABETES MELLITUS WITH STAGE 3B CHRONIC KIDNEY DISEASE (H): Primary | ICD-10-CM

## 2023-02-03 DIAGNOSIS — E10.59 TYPE 1 DIABETES MELLITUS WITH OTHER CIRCULATORY COMPLICATION (H): Chronic | ICD-10-CM

## 2023-02-03 DIAGNOSIS — D63.1 ANEMIA DUE TO STAGE 4 CHRONIC KIDNEY DISEASE (H): ICD-10-CM

## 2023-02-03 LAB
ANION GAP SERPL CALCULATED.3IONS-SCNC: 8 MMOL/L (ref 7–15)
BUN SERPL-MCNC: 22.5 MG/DL (ref 6–20)
CALCIUM SERPL-MCNC: 9 MG/DL (ref 8.6–10)
CHLORIDE SERPL-SCNC: 103 MMOL/L (ref 98–107)
CHOLEST SERPL-MCNC: 140 MG/DL
CREAT SERPL-MCNC: 1.34 MG/DL (ref 0.67–1.17)
DEPRECATED HCO3 PLAS-SCNC: 25 MMOL/L (ref 22–29)
ERYTHROCYTE [DISTWIDTH] IN BLOOD BY AUTOMATED COUNT: 12.6 % (ref 10–15)
GFR SERPL CREATININE-BSD FRML MDRD: 61 ML/MIN/1.73M2
GLUCOSE SERPL-MCNC: 284 MG/DL (ref 70–99)
HBA1C MFR BLD: 7.2 % (ref 4–6.2)
HCT VFR BLD AUTO: 33.4 % (ref 40–53)
HDLC SERPL-MCNC: 58 MG/DL
HGB BLD-MCNC: 11.4 G/DL (ref 13.3–17.7)
LDLC SERPL CALC-MCNC: 68 MG/DL
MCH RBC QN AUTO: 30.2 PG (ref 26.5–33)
MCHC RBC AUTO-ENTMCNC: 34.1 G/DL (ref 31.5–36.5)
MCV RBC AUTO: 89 FL (ref 78–100)
NONHDLC SERPL-MCNC: 82 MG/DL
PLATELET # BLD AUTO: 220 10E3/UL (ref 150–450)
POTASSIUM SERPL-SCNC: 4.6 MMOL/L (ref 3.4–5.3)
RBC # BLD AUTO: 3.77 10E6/UL (ref 4.4–5.9)
SODIUM SERPL-SCNC: 136 MMOL/L (ref 136–145)
TRIGL SERPL-MCNC: 68 MG/DL
WBC # BLD AUTO: 6.8 10E3/UL (ref 4–11)

## 2023-02-03 PROCEDURE — 83036 HEMOGLOBIN GLYCOSYLATED A1C: CPT | Mod: ZL | Performed by: INTERNAL MEDICINE

## 2023-02-03 PROCEDURE — 36415 COLL VENOUS BLD VENIPUNCTURE: CPT | Mod: ZL | Performed by: INTERNAL MEDICINE

## 2023-02-03 PROCEDURE — 80048 BASIC METABOLIC PNL TOTAL CA: CPT | Mod: ZL | Performed by: INTERNAL MEDICINE

## 2023-02-03 PROCEDURE — 99214 OFFICE O/P EST MOD 30 MIN: CPT | Performed by: INTERNAL MEDICINE

## 2023-02-03 PROCEDURE — 85027 COMPLETE CBC AUTOMATED: CPT | Mod: ZL | Performed by: INTERNAL MEDICINE

## 2023-02-03 PROCEDURE — 80061 LIPID PANEL: CPT | Mod: ZL | Performed by: INTERNAL MEDICINE

## 2023-02-03 RX ORDER — LISINOPRIL 40 MG/1
1 TABLET ORAL DAILY
COMMUNITY
Start: 2023-01-21 | End: 2023-02-03

## 2023-02-03 ASSESSMENT — PAIN SCALES - GENERAL: PAINLEVEL: NO PAIN (0)

## 2023-02-03 NOTE — PROGRESS NOTES
Assessment & Plan   1. Type 1 diabetes mellitus with stage 3b chronic kidney disease (H)  Most recently A1c has been relatively well controlled.  He does have brittle diabetes.  Consider return to diabetes education but not needed at this time.  - Hemoglobin A1c; Future  - Basic metabolic panel; Future  - Albumin Random Urine Quantitative with Creat Ratio; Future    2. Chronic kidney disease, stage IV (severe) (H)  His kidney function had improved as of the last visit.  We will recheck again today for baseline.  There definitely was not a component of acute on chronic kidney disease due to hypotension with overaggressive antihypertensive agents.  Blood pressure well controlled on many fewer agents at this time.  Considering the possibility of whitecoat syndrome or that he may have been treated for additional blood pressure medication during times when he was not taking his prescribed medication, or possibly had pain, stress or other reasons for hypertension and were ultimately overtreating.  Regardless his blood pressure is well controlled at this time and kidney function had improved.  Recheck today.  - Albumin Random Urine Quantitative with Creat Ratio; Future  - Hemoglobin; Future    3. Mixed hyperlipidemia  - Lipid Profile    4. 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 Anemia due to stage 4 chronic kidney disease (H)  - CBC with platelets      Return in about 3 months (around 5/3/2023), or if symptoms worsen or fail to improve, for Diabetes.    Signed, Tello Pro MD, FAAP, FACP  Internal Medicine & Pediatrics    Subjective   Clemente Graham is a 58 year old male who presents for follow-up diabetes and hypertension.  Since making the medication changes 3 months ago he has been feeling much better.  His energy level has returned.  He is still working with his Dexcom and insulin pump in order to keep his blood sugars well controlled although  "they do fluctuate widely.  It is difficult for him to eat consistent mealtime because of travel for work.  His blood sugar was in the low 60s before coming here and now its around 180.    Objective   Vitals: /82   Pulse 81   Temp 97.9  F (36.6  C) (Tympanic)   Resp 20   Ht 1.93 m (6' 4\")   Wt 123.2 kg (271 lb 11.2 oz)   SpO2 98%   BMI 33.07 kg/m        Review and Analysis of Data   I personally reviewed the following:  External notes: No  Results: Yes Recent diabetic lab works reviewed  Use of an independent historian: No  Independent review of a test performed by another physician: No  Discussion of management with another physician: No  Moderate risk of morbidity from additional diagnostic testing and/or treatment.      "

## 2023-02-03 NOTE — NURSING NOTE
"Chief Complaint   Patient presents with     Hypertension     Diabetes         Initial /82   Pulse 81   Temp 97.9  F (36.6  C) (Tympanic)   Resp 20   Ht 1.93 m (6' 4\")   Wt 123.2 kg (271 lb 11.2 oz)   SpO2 98%   BMI 33.07 kg/m   Estimated body mass index is 33.07 kg/m  as calculated from the following:    Height as of this encounter: 1.93 m (6' 4\").    Weight as of this encounter: 123.2 kg (271 lb 11.2 oz).         Norma J. Gosselin, LPN   "

## 2023-02-17 ENCOUNTER — TRANSFERRED RECORDS (OUTPATIENT)
Dept: HEALTH INFORMATION MANAGEMENT | Facility: OTHER | Age: 58
End: 2023-02-17
Payer: COMMERCIAL

## 2023-02-17 LAB — RETINOPATHY: POSITIVE

## 2023-02-28 ENCOUNTER — TRANSFERRED RECORDS (OUTPATIENT)
Dept: HEALTH INFORMATION MANAGEMENT | Facility: OTHER | Age: 58
End: 2023-02-28
Payer: COMMERCIAL

## 2023-02-28 LAB — RETINOPATHY: POSITIVE

## 2023-03-10 ENCOUNTER — OFFICE VISIT (OUTPATIENT)
Dept: FAMILY MEDICINE | Facility: OTHER | Age: 58
End: 2023-03-10
Attending: PHYSICIAN ASSISTANT
Payer: COMMERCIAL

## 2023-03-10 VITALS
OXYGEN SATURATION: 96 % | HEIGHT: 76 IN | WEIGHT: 262 LBS | BODY MASS INDEX: 31.9 KG/M2 | SYSTOLIC BLOOD PRESSURE: 132 MMHG | TEMPERATURE: 97 F | HEART RATE: 79 BPM | DIASTOLIC BLOOD PRESSURE: 80 MMHG | RESPIRATION RATE: 20 BRPM

## 2023-03-10 DIAGNOSIS — I10 BENIGN ESSENTIAL HYPERTENSION: Chronic | ICD-10-CM

## 2023-03-10 DIAGNOSIS — E10.22 TYPE 1 DIABETES MELLITUS WITH STAGE 3B CHRONIC KIDNEY DISEASE (H): Chronic | ICD-10-CM

## 2023-03-10 DIAGNOSIS — N18.32 TYPE 1 DIABETES MELLITUS WITH STAGE 3B CHRONIC KIDNEY DISEASE (H): Chronic | ICD-10-CM

## 2023-03-10 DIAGNOSIS — H26.9 CATARACT OF BOTH EYES, UNSPECIFIED CATARACT TYPE: ICD-10-CM

## 2023-03-10 DIAGNOSIS — Z01.818 PREOP GENERAL PHYSICAL EXAM: Primary | ICD-10-CM

## 2023-03-10 PROCEDURE — 99214 OFFICE O/P EST MOD 30 MIN: CPT | Performed by: PHYSICIAN ASSISTANT

## 2023-03-10 RX ORDER — KETOROLAC TROMETHAMINE 5 MG/ML
SOLUTION OPHTHALMIC
COMMUNITY
Start: 2023-03-01 | End: 2023-06-19

## 2023-03-10 RX ORDER — MOXIFLOXACIN 5 MG/ML
SOLUTION/ DROPS OPHTHALMIC
COMMUNITY
Start: 2023-03-01 | End: 2023-06-19

## 2023-03-10 RX ORDER — PREDNISOLONE ACETATE 10 MG/ML
SUSPENSION/ DROPS OPHTHALMIC
COMMUNITY
Start: 2023-03-01 | End: 2023-06-19

## 2023-03-10 ASSESSMENT — PAIN SCALES - GENERAL: PAINLEVEL: NO PAIN (0)

## 2023-03-10 NOTE — NURSING NOTE
Pt presents to clinic today for a pre op exam. Having 2 cataract proedures.       FOOD SECURITY SCREENING QUESTIONS:    The next two questions are to help us understand your food security.  If you are feeling you need any assistance in this area, we have resources available to support you today.    Hunger Vital Signs:  Within the past 12 months we worried whether our food would run out before we got money to buy more. Never  Within the past 12 months the food we bought just didn't last and we didn't have money to get more. Never        Advance care directive on file? no  Advance care directive provided to patient? no     Medication Reconciliation: complete  Esme Pichardo LPN,LPN on 3/10/2023 at 11:01 AM

## 2023-03-10 NOTE — PROGRESS NOTES
[facility]  [address]  [place] [address]  Phone: [phone]  Fax: [phone]  Primary Provider: Tello Pro  Pre-op Performing Provider: HYUN EL      PREOPERATIVE EVALUATION:  Today's date: 3/10/2023    Clemente Graham is a 58 year old male who presents for a preoperative evaluation.    Surgical Information:  Surgery/Procedure: right cataract and left cataract  Surgery Location: [facility]   Surgeon: suzanne  Surgery Date: 3/14/23 (right) and 3/28/2023 (left)  Time of Surgery: tbd  Where patient plans to recover: At home with family  Fax number for surgical facility: [phone]    Type of Anesthesia Anticipated: to be determined    Assessment & Plan     The proposed surgical procedure is considered LOW risk.    Problem List Items Addressed This Visit        Endocrine    Type 1 diabetes mellitus with stage 3 chronic kidney disease (H) (Chronic)    Relevant Orders    FOOT EXAM (Completed)       Circulatory    Benign essential hypertension (Chronic)   Other Visit Diagnoses     Preop general physical exam    -  Primary    Cataract of both eyes, unspecified cataract type            Patient's recent lab work has been stable.  No need to repeat today. No acute concerns at this time prior to surgery.  Patient is approved for surgery.  See patient education.    Risks and Recommendations:  The patient has the following additional risks and recommendations for perioperative complications:  Diabetes:  - Patient is on insulin therapy; diabetic NPO guidelines provided and discussed.    Medication Instructions:  Patient Instructions     Patient should take the following medications the morning of surgery with a small sip of water: hold all meds.  Patient was instructed to hold the following medications the morning of surgery: hold all other meds.     Patient was advised to call our office and the surgical services with any change in condition or new  symptoms if they were to develop between today and their surgical date.  Especially any cardiopulmonary symptoms or symptoms concerning for an infection.     Discontinue aspirin, aleve, naproxen and ibuprofen 7 days prior to surgery to reduce bleeding risk.  It is fine to take tylenol the week before surgery.  Hold vitamins and herbal remedies for 7 days before surgery.       For informational purposes only. Not to replace the advice of your health care provider. Copyright   2003,  [facility]. All rights reserved. Clinically reviewed by Teri Jeffrey MD. Anctu [id number] - REV .  Preparing for Your Surgery  Getting started  A nurse will call you to review your health history and instructions. They will give you an arrival time based on your scheduled surgery time. Please be ready to share:    Your doctor's clinic name and phone number    Your medical, surgical, and anesthesia history    A list of allergies and sensitivities    A list of medicines, including herbal treatments and over-the-counter drugs    Whether the patient has a legal guardian (ask how to send us the papers in advance)  Please tell us if you're pregnant--or if there's any chance you might be pregnant. Some surgeries may injure a fetus (unborn baby), so they require a pregnancy test. Surgeries that are safe for a fetus don't always need a test, and you can choose whether to have one.   If you have a child who's having surgery, please ask for a copy of Preparing for Your Child's Surgery.    Preparing for surgery  1. Within 10 to 30 days of surgery: Have a pre-op exam (sometimes called an H&P, or History and Physical). This can be done at a clinic or pre-operative center.  ? If you're having a , you may not need this exam. Talk to your care team.  2. At your pre-op exam, talk to your care team about all medicines you take. If you need to stop any medicines before surgery, ask when to start taking them again.  ? We do  this for your safety. Many medicines can make you bleed too much during surgery. Some change how well surgery (anesthesia) drugs work.  3. Call your insurance company to let them know you're having surgery. (If you don't have insurance, call [phone].)  4. Call your clinic if there's any change in your health. This includes signs of a cold or flu (sore throat, runny nose, cough, rash, fever). It also includes a scrape or scratch near the surgery site.  5. If you have questions on the day of surgery, call your hospital or surgery center.  Eating and drinking guidelines  For your safety: Unless your surgeon tells you otherwise, follow the guidelines below.    Eat and drink as usual until 8 hours before you arrive for surgery. After that, no food or milk.    Drink clear liquids until 2 hours before you arrive. These are liquids you can see through, like water, Gatorade, and Propel Water. They also include plain black coffee and tea (no cream or milk), candy, and breath mints. You can spit out gum when you arrive.    If you drink alcohol: Stop drinking it the night before surgery.    If your care team tells you to take medicine on the morning of surgery, it's okay to take it with a sip of water.  Preventing infection  1. Shower or bathe the night before and morning of your surgery. Follow the instructions your clinic gave you. (If no instructions, use regular soap.)  2. Don't shave or clip hair near your surgery site. We'll remove the hair if needed.  3. Don't smoke or vape the morning of surgery. You may chew nicotine gum up to 2 hours before surgery. A nicotine patch is okay.  ? Note: Some surgeries require you to completely quit smoking and nicotine. Check with your surgeon.  4. Your care team will make every effort to keep you safe from infection. We will:  ? Clean our hands often with soap and water (or an alcohol-based hand rub).  ? Clean the skin at your surgery site with a special soap that kills  germs.  ? Give you a special gown to keep you warm. (Cold raises the risk of infection.)  ? Wear special hair covers, masks, gowns and gloves during surgery.  ? Give antibiotic medicine, if prescribed. Not all surgeries need antibiotics.  What to bring on the day of surgery  1. Photo ID and insurance card  2. Copy of your health care directive, if you have one  3. Glasses and hearing aids (bring cases)  ? You can't wear contacts during surgery  4. Inhaler and eye drops, if you use them (tell us about these when you arrive)  5. CPAP machine or breathing device, if you use them  6. A few personal items, if spending the night  7. If you have . . .  ? A pacemaker, ICD (cardiac defibrillator) or other implant: Bring the ID card.  ? An implanted stimulator: Bring the remote control.  ? A legal guardian: Bring a copy of the certified (court-stamped) guardianship papers.  Please remove any jewelry, including body piercings. Leave jewelry and other valuables at home.  If you're going home the day of surgery    You must have a responsible adult drive you home. They should stay with you overnight as well.    If you don't have someone to stay with you, and you aren't safe to go home alone, we may keep you overnight. Insurance often won't pay for this.  After surgery  If it's hard to control your pain or you need more pain medicine, please call your surgeon's office.  Questions?   If you have any questions for your care team, list them here: _________________________________________________________________________________________________________________________________________________________________________ ____________________________________ ____________________________________ ____________________________________       RECOMMENDATION:  APPROVAL GIVEN to proceed with proposed procedure, without further diagnostic evaluation.      30 minutes spent on the date of the encounter doing chart review, history and exam, documentation  and further activities per the note    Subjective     HPI related to upcoming procedure:   Patient has cataracts bilaterally.  Symptoms have worsened since this past winter.  Has decreased vision that is worse at night.  Currently scheduled for bilateral cataract removal.    Preop Questions 3/3/2023   1. Have you ever had a heart attack or stroke? No   2. Have you ever had surgery on your heart or blood vessels, such as a stent placement, a coronary artery bypass, or surgery on an artery in your head, neck, heart, or legs? No   3. Do you have chest pain with activity? No   4. Do you have a history of  heart failure? No   5. Do you currently have a cold, bronchitis or symptoms of other infection? No   6. Do you have a cough, shortness of breath, or wheezing? No   7. Do you or anyone in your family have previous history of blood clots? No   8. Do you or does anyone in your family have a serious bleeding problem such as prolonged bleeding following surgeries or cuts? No   9. Have you ever had problems with anemia or been told to take iron pills? No   10. Have you had any abnormal blood loss such as black, tarry or bloody stools? No   11. Have you ever had a blood transfusion? No   12. Are you willing to have a blood transfusion if it is medically needed before, during, or after your surgery? Yes   13. Have you or any of your relatives ever had problems with anesthesia? No   14. Do you have sleep apnea, excessive snoring or daytime drowsiness? No   15. Do you have any artifical heart valves or other implanted medical devices like a pacemaker, defibrillator, or continuous glucose monitor? No   16. Do you have artificial joints? No   17. Are you allergic to latex? No       Health Care Directive:  Patient does not have a Health Care Directive or Living Will: Discussed advance care planning with patient; however, patient declined at this time.    Preoperative Review of :   reviewed - no record of controlled substances  prescribed.      Status of Chronic Conditions:  DIABETES - Patient has a longstanding history of DiabetesType Type I . Patient is being treated with diet, exercise, insulin pump and ASA and denies significant side effects. Control has been good. Complicating factors include but are not limited to: hypertension, neuropathy and chronic kidney disease.     HYPERTENSION - Patient has longstanding history of HTN , currently denies any symptoms referable to elevated blood pressure. Specifically denies chest pain, palpitations, dyspnea, orthopnea, PND or peripheral edema. Blood pressure readings have been in normal range. Current medication regimen is as listed below. Patient denies any side effects of medication.       Patient has tolerated surgery well in the past.  Patient has no recent cough or cold symptoms.  No recent fevers, chills, sore throat, ear pain, chest pain, palpitations, problems breathing, stomachaches, nausea, vomiting, diarrhea, constipation, blood in stool or urine, dysuria, frequency, urgency.    Patient can walk up a flight of stairs without becoming short of breath or having chest pain: YES   Patient is able to tolerate greater than 4 METs of activity without any cardiopulmonary symptoms.        Review of Systems  CONSTITUTIONAL: NEGATIVE for fever, chills, change in weight  INTEGUMENTARY/SKIN: NEGATIVE for worrisome rashes, moles or lesions  EYES: NEGATIVE for vision changes or irritation  ENT/MOUTH: NEGATIVE for ear, mouth and throat problems  RESP: NEGATIVE for significant cough or SOB  CV: NEGATIVE for chest pain, palpitations or peripheral edema  GI: NEGATIVE for nausea, abdominal pain, heartburn, or change in bowel habits  : NEGATIVE for frequency, dysuria, or hematuria  MUSCULOSKELETAL: NEGATIVE for significant arthralgias or myalgia  NEURO: NEGATIVE for weakness, dizziness or paresthesias  ENDOCRINE: NEGATIVE for temperature intolerance, skin/hair changes  HEME: NEGATIVE for bleeding  problems  PSYCHIATRIC: NEGATIVE for changes in mood or affect    Patient Active Problem List    Diagnosis Date Noted     Chronic kidney disease, stage IV (severe) (H) 09/10/2021     Priority: Medium     Moderate protein-calorie malnutrition (H) 04/20/2021     Priority: Medium     Allergic conjunctivitis, bilateral 09/22/2020     Priority: Medium     Diabetic peripheral neuropathy (H) 08/11/2020     Priority: Medium     Lower limb amputation, great toe, right (H) 08/11/2020     Priority: Medium     Hx diabetic foot infection 08/11/2020     Priority: Medium     Type 1 diabetes mellitus with stage 3 chronic kidney disease (H) 05/21/2020     Priority: Medium     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) 11/18/2019     Priority: Medium     Benign essential hypertension 11/18/2019     Priority: Medium     Urinary retention 11/18/2019     Priority: Medium     Insulin pump in place 08/26/2019     Priority: Medium     Mixed hyperlipidemia 04/17/2006     Priority: Medium     IMO Update 10/11        Past Medical History:   Diagnosis Date     Carpal tunnel syndrome, bilateral      Diabetic foot ulcer (H)      Hyperlipidemia      Hypertension      Microalbuminuria      Neuropathy      Retinopathy      Trigger finger of both hands      Type 1 diabetes (H) 1982     Ulnar nerve entrapment at elbow, left      Urinary retention      Past Surgical History:   Procedure Laterality Date     AMPUTATION Right     Great toe     CARPAL TUNNEL RELEASE RT/LT Bilateral      CLOSED REDUCTION, PERCUTANEOUS PINNING HIP      age 14, pinned for dislocation     COLONOSCOPY      Due 2022. has had two, history of polyps. recommend 5 year follow-up     ROTATOR CUFF REPAIR RT/LT Bilateral      Current Outpatient Medications   Medication Sig Dispense Refill     aspirin (ASA) 81 MG tablet Take 1 tablet by mouth daily       atorvastatin (LIPITOR) 40 MG tablet Take 1 tablet (40 mg) by mouth daily 90 tablet 3     blood glucose (NO BRAND  SPECIFIED) lancets standard Dispense item covered by insurance. Test blood sugar 6 times daily. 100 each 11     blood glucose (NO BRAND SPECIFIED) test strip Dispense item covered by insurance. Test blood sugar 6 times daily. 100 strip 11     blood glucose monitoring (NO BRAND SPECIFIED) meter device kit Dispense option covered by insurance. Test blood sugar 6 times daily. 1 kit 11     Catheters [facility] Magic3 14F ref #[id number] - use to self-catheterize 4 times daily.       Continuous Blood Gluc Sensor (DEXCOM G6 SENSOR) MISC 1 each every 10 days Change every 10 days. 3 each 5     Continuous Blood Gluc Transmit (DEXCOM G6 TRANSMITTER) MISC 1 each every 3 months Change every 3 months. 1 each 1     ergocalciferol (ERGOCALCIFEROL) 1.25 MG (66326 UT) capsule Take 1 capsule (50,000 Units) by mouth once a week 12 capsule 4     glucagon 1 MG kit Inject 1 mg into the muscle as needed       insulin aspart (NOVOLOG VIAL) 100 UNITS/ML vial USE AS DIRECTED. MAX DAILY DOSE 70 UNITS 60 mL 11     ketorolac (ACULAR) 0.5 % ophthalmic solution        ketorolac (ACULAR) 0.5 % ophthalmic solution instill 1 drop by ophthalmic route Right Eye 2 times a day 3 days before surgery, then 4 times a day after surgery and taper as instructed       lisinopril (ZESTRIL) 20 MG tablet Take 1 tablet (20 mg) by mouth daily 90 tablet 3     loratadine (CLARITIN) 10 MG tablet Take 10 mg by mouth daily       moxifloxacin (VIGAMOX) 0.5 % ophthalmic solution instill 1 drop by ophthalmic route Right Eye 3 Times a Day 3 Days Before Surgery, continue for 7 days after surgery, then stop       moxifloxacin (VIGAMOX) 0.5 % ophthalmic solution        Multiple Vitamin (MULTI VITAMIN W/D-3) TABS Take 1 tablet by mouth daily       order for DME Automatic electronic sphygmomanometer including machine, cuff, tubing and all associated supplies. Essential Hypertension, chronic. Lifelong. 1 each 11     prednisoLONE acetate (PRED FORTE) 1 % ophthalmic suspension instill 1  "drop by ophthalmic route Right eye 4 times a day after surgery for 1 week, then Taper as Instructed       prednisoLONE acetate (PRED FORTE) 1 % ophthalmic suspension        Riboflavin (VITAMIN B-2 PO) Take 50 mg by mouth once a week       tamsulosin (FLOMAX) 0.4 MG capsule Take 1 capsule (0.4 mg) by mouth daily 90 capsule 3       No Known Allergies     Social History     Tobacco Use     Smoking status: Never     Smokeless tobacco: Never     Tobacco comments:     no ecig   Substance Use Topics     Alcohol use: Not Currently     Comment: maybe 2-3 drinks a month     Family History   Problem Relation Age of Onset     Cancer Mother         ovarian     Prostate Cancer Father 65     Other - See Comments Sister         shot     Cancer Brother 50        had colon removed, ?colon cancer     Other - See Comments Son         lymphedema     History   Drug Use Unknown         Objective     /80 (BP Location: Right arm, Patient Position: Sitting, Cuff Size: Adult Large)   Pulse 79   Temp 97  F (36.1  C) (Tympanic)   Resp 20   Ht 1.93 m (6' 4\")   Wt 118.8 kg (262 lb)   SpO2 96%   BMI 31.89 kg/m      Physical Exam    GENERAL APPEARANCE: healthy, alert and no distress     EYES: EOMI,  PERRL     HENT: ear canals and TM's normal and nose and mouth without ulcers or lesions     NECK: no adenopathy, no asymmetry, masses, or scars and thyroid normal to palpation     RESP: lungs clear to auscultation - no rales, rhonchi or wheezes     CV: regular rates and rhythm, normal S1 S2, no S3 or S4 and no murmur, click or rub     ABDOMEN:  soft, nontender, no HSM or masses and bowel sounds normal     MS: extremities normal- no gross deformities noted, no evidence of inflammation in joints, FROM in all extremities.     SKIN: no suspicious lesions or rashes     NEURO: Normal strength and tone, sensory exam grossly normal, mentation intact and speech normal     PSYCH: mentation appears normal. and affect normal/bright  Foot exam: No sores " or ulcerations appreciated.  Decreased sensation appreciated on the monofilament sensation exam bilaterally.      LYMPHATICS: No cervical adenopathy    Recent Labs   Lab Test 02/03/23  1534 11/29/22  1124 11/15/22  1211 03/08/22  1531 03/08/22  1531   HGB 11.4*  --   --   --  10.9*     --   --   --  279    139 132*  --  142   POTASSIUM 4.6 4.7 5.2   < > 3.8   CR 1.34* 1.28* 2.29*  --  1.59*   A1C 7.2*  --  10.8*  --  7.7*    < > = values in this interval not displayed.        Diagnostics:  Recent Results (from the past 720 hour(s))   EYE EXAM - HIM SCAN    Collection Time: 02/17/23 12:00 AM   Result Value Ref Range    RETINOPATHY POSITIVE (A)        Lab Results   Component Value Date    WBC 6.8 02/03/2023    WBC 6.0 02/18/2021     Lab Results   Component Value Date    RBC 3.77 02/03/2023    RBC 3.78 02/18/2021     Lab Results   Component Value Date    HGB 11.4 02/03/2023    HGB 11.0 02/18/2021     Lab Results   Component Value Date    HCT 33.4 02/03/2023    HCT 32.6 02/18/2021     Lab Results   Component Value Date    MCV 89 02/03/2023    MCV 86 02/18/2021     Lab Results   Component Value Date    MCH 30.2 02/03/2023    MCH 29.1 02/18/2021     Lab Results   Component Value Date    MCHC 34.1 02/03/2023    MCHC 33.7 02/18/2021     Lab Results   Component Value Date    RDW 12.6 02/03/2023    RDW 13.1 02/18/2021     Lab Results   Component Value Date     02/03/2023     02/18/2021     Last Comprehensive Metabolic Panel:  Lab Results   Component Value Date     02/03/2023    POTASSIUM 4.6 02/03/2023    CHLORIDE 103 02/03/2023    CO2 25 02/03/2023    ANIONGAP 8 02/03/2023     (H) 02/03/2023    BUN 22.5 (H) 02/03/2023    CR 1.34 (H) 02/03/2023    GFRESTIMATED 61 02/03/2023    JAMMIE 9.0 02/03/2023     Hemoglobin A1C   Date Value Ref Range Status   02/03/2023 7.2 (H) 4.0 - 6.2 % Final   02/18/2021 10.3 (H) 4.0 - 6.0 % Final         No EKG required for low risk surgery (cataract, skin  procedure, breast biopsy, etc).    Revised Cardiac Risk Index (RCRI):  The patient has the following serious cardiovascular risks for perioperative complications:   - Diabetes Mellitus (on Insulin) = 1 point     RCRI Interpretation: 0 points: Class I (very low risk - 0.4% complication rate)           Signed Electronically by: Aileen Toledo PA-C  Copy of this evaluation report is provided to requesting physician.

## 2023-03-10 NOTE — PATIENT INSTRUCTIONS
Patient should take the following medications the morning of surgery with a small sip of water: hold all meds.  Patient was instructed to hold the following medications the morning of surgery: hold all other meds.     Patient was advised to call our office and the surgical services with any change in condition or new symptoms if they were to develop between today and their surgical date.  Especially any cardiopulmonary symptoms or symptoms concerning for an infection.     Discontinue aspirin, aleve, naproxen and ibuprofen 7 days prior to surgery to reduce bleeding risk.  It is fine to take tylenol the week before surgery.  Hold vitamins and herbal remedies for 7 days before surgery.       For informational purposes only. Not to replace the advice of your health care provider. Copyright   2003, 2019 [employer] [facility]. All rights reserved. Clinically reviewed by Teri Jeffrey MD. [employer] [phone] - REV 12/22.  Preparing for Your Surgery  Getting started  A nurse will call you to review your health history and instructions. They will give you an arrival time based on your scheduled surgery time. Please be ready to share:  Your doctor's clinic name and phone number  Your medical, surgical, and anesthesia history  A list of allergies and sensitivities  A list of medicines, including herbal treatments and over-the-counter drugs  Whether the patient has a legal guardian (ask how to send us the papers in advance)  Please tell us if you're pregnant--or if there's any chance you might be pregnant. Some surgeries may injure a fetus (unborn baby), so they require a pregnancy test. Surgeries that are safe for a fetus don't always need a test, and you can choose whether to have one.   If you have a child who's having surgery, please ask for a copy of Preparing for Your Child's Surgery.    Preparing for surgery  Within 10 to 30 days of surgery: Have a pre-op exam (sometimes called an H&P, or History and Physical). This can  be done at a clinic or pre-operative center.  If you're having a , you may not need this exam. Talk to your care team.  At your pre-op exam, talk to your care team about all medicines you take. If you need to stop any medicines before surgery, ask when to start taking them again.  We do this for your safety. Many medicines can make you bleed too much during surgery. Some change how well surgery (anesthesia) drugs work.  Call your insurance company to let them know you're having surgery. (If you don't have insurance, call [phone].)  Call your clinic if there's any change in your health. This includes signs of a cold or flu (sore throat, runny nose, cough, rash, fever). It also includes a scrape or scratch near the surgery site.  If you have questions on the day of surgery, call your hospital or surgery center.  Eating and drinking guidelines  For your safety: Unless your surgeon tells you otherwise, follow the guidelines below.  Eat and drink as usual until 8 hours before you arrive for surgery. After that, no food or milk.  Drink clear liquids until 2 hours before you arrive. These are liquids you can see through, like water, Gatorade, and Propel Water. They also include plain black coffee and tea (no cream or milk), candy, and breath mints. You can spit out gum when you arrive.  If you drink alcohol: Stop drinking it the night before surgery.  If your care team tells you to take medicine on the morning of surgery, it's okay to take it with a sip of water.  Preventing infection  Shower or bathe the night before and morning of your surgery. Follow the instructions your clinic gave you. (If no instructions, use regular soap.)  Don't shave or clip hair near your surgery site. We'll remove the hair if needed.  Don't smoke or vape the morning of surgery. You may chew nicotine gum up to 2 hours before surgery. A nicotine patch is okay.  Note: Some surgeries require you to completely quit smoking and  nicotine. Check with your surgeon.  Your care team will make every effort to keep you safe from infection. We will:  Clean our hands often with soap and water (or an alcohol-based hand rub).  Clean the skin at your surgery site with a special soap that kills germs.  Give you a special gown to keep you warm. (Cold raises the risk of infection.)  Wear special hair covers, masks, gowns and gloves during surgery.  Give antibiotic medicine, if prescribed. Not all surgeries need antibiotics.  What to bring on the day of surgery  Photo ID and insurance card  Copy of your health care directive, if you have one  Glasses and hearing aids (bring cases)  You can't wear contacts during surgery  Inhaler and eye drops, if you use them (tell us about these when you arrive)  CPAP machine or breathing device, if you use them  A few personal items, if spending the night  If you have . . .  A pacemaker, ICD (cardiac defibrillator) or other implant: Bring the ID card.  An implanted stimulator: Bring the remote control.  A legal guardian: Bring a copy of the certified (court-stamped) guardianship papers.  Please remove any jewelry, including body piercings. Leave jewelry and other valuables at home.  If you're going home the day of surgery  You must have a responsible adult drive you home. They should stay with you overnight as well.  If you don't have someone to stay with you, and you aren't safe to go home alone, we may keep you overnight. Insurance often won't pay for this.  After surgery  If it's hard to control your pain or you need more pain medicine, please call your surgeon's office.  Questions?   If you have any questions for your care team, list them here: _________________________________________________________________________________________________________________________________________________________________________ ____________________________________ ____________________________________  ____________________________________

## 2023-03-14 ENCOUNTER — TELEPHONE (OUTPATIENT)
Dept: PEDIATRICS | Facility: OTHER | Age: 58
End: 2023-03-14
Payer: COMMERCIAL

## 2023-03-14 NOTE — TELEPHONE ENCOUNTER
Pt has very high BP.  Surgery was cancelled, they are treating high BP, but he does need to be seen.  Please call.    Nathaniel Jimenez on 3/14/2023 at 9:44 AM

## 2023-03-14 NOTE — TELEPHONE ENCOUNTER
Called patient and he will take that appt date and time. Will get put in schedule.  Norma J. Gosselin, LPN .......  3/14/2023  5:06 PM

## 2023-03-16 ENCOUNTER — VIRTUAL VISIT (OUTPATIENT)
Dept: PEDIATRICS | Facility: OTHER | Age: 58
End: 2023-03-16
Attending: INTERNAL MEDICINE
Payer: COMMERCIAL

## 2023-03-16 DIAGNOSIS — E10.59 TYPE 1 DIABETES MELLITUS WITH OTHER CIRCULATORY COMPLICATION (H): Chronic | ICD-10-CM

## 2023-03-16 DIAGNOSIS — I10 BENIGN ESSENTIAL HYPERTENSION: Chronic | ICD-10-CM

## 2023-03-16 DIAGNOSIS — E10.43: Primary | ICD-10-CM

## 2023-03-16 DIAGNOSIS — E66.09 NON MORBID OBESITY DUE TO EXCESS CALORIES: ICD-10-CM

## 2023-03-16 PROCEDURE — 99213 OFFICE O/P EST LOW 20 MIN: CPT | Mod: 95 | Performed by: INTERNAL MEDICINE

## 2023-03-16 RX ORDER — HYDRALAZINE HYDROCHLORIDE 50 MG/1
25 TABLET, FILM COATED ORAL 3 TIMES DAILY PRN
Qty: 180 TABLET | Refills: 3 | Status: SHIPPED | OUTPATIENT
Start: 2023-03-16 | End: 2023-08-10

## 2023-03-16 RX ORDER — FUROSEMIDE 20 MG
40 TABLET ORAL DAILY
Qty: 180 TABLET | Refills: 3 | Status: SHIPPED | OUTPATIENT
Start: 2023-03-16 | End: 2023-08-10

## 2023-03-16 NOTE — PROGRESS NOTES
Juan is a 58 year old who is being evaluated via a billable telephone visit.      What phone number would you like to be contacted at?   How would you like to obtain your AVS?   Distant Location (provider location):  On-site      ICD-10-CM    1. Type 1 diabetes mellitus with autonomic dysfunction (H)  E10.43       2. Benign essential hypertension  I10 hydrALAZINE (APRESOLINE) 50 MG tablet     furosemide (LASIX) 20 MG tablet      3. Type 1 diabetes mellitus with other circulatory complication (H)  E10.59 hydrALAZINE (APRESOLINE) 50 MG tablet     furosemide (LASIX) 20 MG tablet      4. Non morbid obesity due to excess calories  E66.09 hydrALAZINE (APRESOLINE) 50 MG tablet     furosemide (LASIX) 20 MG tablet        Juan has been having some labile blood pressures.  As recent as November he had a blood pressure of 82/45.  Drastic reductions occurred in medications and now blood pressures are approaching the 180s.  His weight has fluctuated quite a bit which is certainly a factor.  Possible volume overloading unable to assess volume status over the telephone.  We discussed the delicate balance regarding hypertension and chronic kidney disease.  I believe that his lability of blood pressures is likely related to autonomic dysfunction that is developing.  We will have a low threshold for expert consultation if ongoing difficulties with blood pressure stability remain.    Signed, Tello Pro MD, FAAP, FACP  Internal Medicine & Pediatrics      Subjective   Juan is a 58 year old, presenting for the following health issues:  Hypertension    Having high BP  Tues scheduled for cataract: 200/100  Now 188/92  Low 150/83    He has been walking a mile every evening.  He is not getting as much exercise at work    History of Present Illness       Hypertension: He presents for follow up of hypertension.  He does not check blood pressure  regularly outside of the clinic. Outside blood pressures have been over 140/90. He does not  follow a low salt diet.     He eats 2-3 servings of fruits and vegetables daily.He consumes 0 sweetened beverage(s) daily.He exercises with enough effort to increase his heart rate 10 to 19 minutes per day.  He exercises with enough effort to increase his heart rate 3 or less days per week.   He is taking medications regularly.             Review of Systems         Objective    Vitals - Patient Reported  Systolic (Patient Reported): (!) 155  Diastolic (Patient Reported): 83  Weight (Patient Reported): 117.9 kg (260 lb)  Pain Score: No Pain (0)        Physical Exam   healthy, alert and no distress  PSYCH: Alert and oriented times 3; coherent speech, normal   rate and volume, able to articulate logical thoughts, able   to abstract reason, no tangential thoughts, no hallucinations   or delusions  His affect is normal  RESP: No cough, no audible wheezing, able to talk in full sentences  Remainder of exam unable to be completed due to telephone visits                Phone call duration: 14 minutes

## 2023-03-22 ENCOUNTER — TELEPHONE (OUTPATIENT)
Dept: PEDIATRICS | Facility: OTHER | Age: 58
End: 2023-03-22
Payer: COMMERCIAL

## 2023-03-22 NOTE — LETTER
March 24, 2023      Clemente Graham  [address] [address] [id number]    To whomever it may concern:    Clemente Graham was seen in my clinic 2/3/23.  His blood pressures are now under control.      Signed, Tello Pro MD, FAAP, FACP  Internal Medicine & Pediatrics

## 2023-03-22 NOTE — TELEPHONE ENCOUNTER
Patient called to talk to [facility] regarding clearance for cataract surgery. He needs a note written and faxed to the surgery site. Please call.    Nelia Giron on 3/22/2023 at 2:35 PM

## 2023-03-23 NOTE — TELEPHONE ENCOUNTER
He had the preop clearance exam completed on 3/10/2023 for surgery on 3/14 and 3/28.  This was already sent to the surgery center.  Does he need something in addition to this?  Aileen Toledo PA-C.......... 3/23/2023 1:08 PM

## 2023-03-23 NOTE — TELEPHONE ENCOUNTER
Pre-op with Keeley Toledo 3/10/23. Will send to Keeley to address.  Norma J. Gosselin, LPN .......  3/23/2023  11:43 AM

## 2023-03-24 NOTE — TELEPHONE ENCOUNTER
I would need to see him in clinic to recheck his blood pressure to make sure he is still cleared for surgery with the recent elevated blood pressures. Ok to use same day.   Aileen Toledo PA-C ..................3/24/2023 12:25 PM

## 2023-03-24 NOTE — TELEPHONE ENCOUNTER
Dr. Pro, patient stated you increased his blood pressure medication and stated he is cleared for surgery. Are you willing to writer a letter stating that? Or do you recommend an appointment with a provider for a blood pressure check as Keeley Toledo PA-C recommends?    Tamiko Burnham, CMA on 3/24/2023 at 1:37 PM

## 2023-03-24 NOTE — TELEPHONE ENCOUNTER
After verifying patient's name and date of birth, patient was given the below information.  Norma J. Gosselin, LPN....3/24/2023 4:24 PM

## 2023-03-24 NOTE — TELEPHONE ENCOUNTER
Patient states on the day of his surgery his blood pressure was high so they would not do the surgery. He called Dr. Pro's office and spoke with his nurse. Patient states Dr. Pro increased his blood pressure medication and stated he was cleared for surgery. The eye clinic is requiring a letter be faxed to them stating patient is cleared for surgery. Please advise. (Pre-op was done by SHANTA Zamora)     Tamiko Burnham CMA on 3/24/2023 at 9:44 AM

## 2023-03-24 NOTE — TELEPHONE ENCOUNTER
Patient has surgery coming up on 03/28 and has heard nothing about his pre op and wants a call.      Annel Florence on 3/24/2023 at 9:25 AM

## 2023-03-27 ENCOUNTER — OFFICE VISIT (OUTPATIENT)
Dept: FAMILY MEDICINE | Facility: OTHER | Age: 58
End: 2023-03-27
Attending: PHYSICIAN ASSISTANT
Payer: COMMERCIAL

## 2023-03-27 VITALS
SYSTOLIC BLOOD PRESSURE: 134 MMHG | BODY MASS INDEX: 31.48 KG/M2 | OXYGEN SATURATION: 98 % | HEART RATE: 86 BPM | DIASTOLIC BLOOD PRESSURE: 64 MMHG | TEMPERATURE: 96.9 F | RESPIRATION RATE: 14 BRPM | WEIGHT: 258.6 LBS

## 2023-03-27 DIAGNOSIS — I10 BENIGN ESSENTIAL HYPERTENSION: ICD-10-CM

## 2023-03-27 DIAGNOSIS — Z01.30 BLOOD PRESSURE CHECK: Primary | ICD-10-CM

## 2023-03-27 DIAGNOSIS — Z41.9 ELECTIVE SURGICAL PROCEDURE: ICD-10-CM

## 2023-03-27 PROCEDURE — 99213 OFFICE O/P EST LOW 20 MIN: CPT | Performed by: PHYSICIAN ASSISTANT

## 2023-03-27 RX ORDER — HYDRALAZINE HYDROCHLORIDE 25 MG/1
TABLET, FILM COATED ORAL
COMMUNITY
Start: 2023-03-16 | End: 2023-06-19

## 2023-03-27 RX ORDER — HYDRALAZINE HYDROCHLORIDE 25 MG/1
25 TABLET, FILM COATED ORAL
COMMUNITY
Start: 2023-03-16 | End: 2023-06-19

## 2023-03-27 ASSESSMENT — PAIN SCALES - GENERAL: PAINLEVEL: NO PAIN (0)

## 2023-03-27 NOTE — NURSING NOTE
"Chief Complaint   Patient presents with     RECHECK     B/P check for surgery, letter       Initial /64 (BP Location: Right arm, Patient Position: Sitting, Cuff Size: Adult Regular)   Pulse 86   Temp 96.9  F (36.1  C) (Temporal)   Resp 14   Wt 117.3 kg (258 lb 9.6 oz)   SpO2 98%   BMI 31.48 kg/m   Estimated body mass index is 31.48 kg/m  as calculated from the following:    Height as of 3/10/23: 1.93 m (6' 4\").    Weight as of this encounter: 117.3 kg (258 lb 9.6 oz).     Medication Reconciliation: complete      FOOD SECURITY SCREENING QUESTIONS:    The next two questions are to help us understand your food security.  If you are feeling you need any assistance in this area, we have resources available to support you today.    Hunger Vital Signs:  Within the past 12 months we worried whether our food would run out before we got money to buy more. Never  Within the past 12 months the food we bought just didn't last and we didn't have money to get more. Never        Advance care plan reviewed      Jadyn Pope LPN on 3/27/2023 at 8:17 AM      "

## 2023-03-27 NOTE — PROGRESS NOTES
Assessment & Plan     1. Blood pressure check  2. Benign essential hypertension  3. Elective surgical procedure  -Patient presenting for blood pressure check prior to surgery tomorrow.  Blood pressure returning within normal range at 134/64 today.  Approval given to proceed with bilateral cataract surgery - Right sided 3/28/23 and Left approximately 2 weeks later. Continue with blood pressure medications as prescribed by PCP. Letter written and faxed per request to surgical center. Copies additionally provided to patient. Patient is in agreement and understanding of the above treatment plan. All questions and concerns were addressed and answered to patient's satisfaction. AVS reviewed with patient.    Return if symptoms worsen or fail to improve.    Jennifer Hayes PA-C  [facility]    Jenna Martinez is a 58 year old, presenting for the following health issues:  RECHECK (B/P check for surgery, letter)    Additional Questions 3/27/2023   Roomed by SHAAN Salamanca     History of Present Illness       Hypertension: He presents for follow up of hypertension.  He does not check blood pressure  regularly outside of the clinic. Outside blood pressures have been over 140/90. He does not follow a low salt diet.     He eats 2-3 servings of fruits and vegetables daily.He consumes 0 sweetened beverage(s) daily.He exercises with enough effort to increase his heart rate 10 to 19 minutes per day.  He exercises with enough effort to increase his heart rate 3 or less days per week.   He is taking medications regularly.     Patient presenting in follow-up today in regards to blood pressure prior to upcoming cataract surgery, he will be having a right cataract removal tomorrow in the left in 2 weeks.  Current blood pressure regimen includes hydralazine as needed if systolic blood pressures over 180, Lasix 40 mg daily, lisinopril 20 mg daily.    He has been checking his blood pressures at home over the last 2  weeks, averages running in the 110s to 120s systolically and 50s to 70s diastolically.  Pressure this morning upon waking was 111/56, he took blood pressure prior to arrival which returned at 130/60.  He declines any chest pain, shortness of breath, vision changes, headaches or other worrisome signs or symptoms.    Review of Systems   Constitutional, HEENT, cardiovascular, pulmonary, GI, , musculoskeletal, neuro, skin, endocrine and psych systems are negative, except as otherwise noted.      Objective    /64 (BP Location: Right arm, Patient Position: Sitting, Cuff Size: Adult Regular)   Pulse 86   Temp 96.9  F (36.1  C) (Temporal)   Resp 14   Wt 117.3 kg (258 lb 9.6 oz)   SpO2 98%   BMI 31.48 kg/m    Body mass index is 31.48 kg/m .  Physical Exam   GENERAL: healthy, alert and no distress  NECK: no adenopathy, no asymmetry, masses, or scars and thyroid normal to palpation  RESP: lungs clear to auscultation - no rales, rhonchi or wheezes  CV: regular rate and rhythm, normal S1 S2, no S3 or S4, no murmur, click or rub, no peripheral edema and peripheral pulses strong  SKIN: no suspicious lesions or rashes  PSYCH: mentation appears normal, affect normal/bright

## 2023-03-27 NOTE — LETTER
[facility]  [address]  [place] [address]  Phone: [phone]  Fax: [phone]    March 27, 2023        Clemente Graham  [id number] [place] [id number]          To whom it may concern:    RE: Clemente Graham    Patient was seen and treated today at our clinic. Blood pressure is well controlled today, approval given to proceed with procedure (cataracts).     Please contact me for questions or concerns.      Sincerely,        Jennifer Hayes PA-C

## 2023-04-17 DIAGNOSIS — N40.0 BENIGN PROSTATIC HYPERPLASIA, UNSPECIFIED WHETHER LOWER URINARY TRACT SYMPTOMS PRESENT: ICD-10-CM

## 2023-04-20 RX ORDER — TAMSULOSIN HYDROCHLORIDE 0.4 MG/1
0.4 CAPSULE ORAL DAILY
Qty: 90 CAPSULE | Refills: 3 | Status: SHIPPED | OUTPATIENT
Start: 2023-04-20 | End: 2024-01-26

## 2023-04-20 NOTE — TELEPHONE ENCOUNTER
[facility] sent Rx request for the following:    Tamsulosin HCl 0.4 MG Oral Capsule (Flomax)  Last Prescription Date:   3/8/22  Last Fill Qty/Refills:         90, R-3    Last Office Visit:              3/27/23   Future Office visit:           None   Jennifer Stark RN on 4/20/2023 at 9:59 AM

## 2023-04-25 DIAGNOSIS — E10.8 TYPE 1 DIABETES MELLITUS WITH UNSPECIFIED COMPLICATIONS (H): Primary | ICD-10-CM

## 2023-05-02 RX ORDER — INSULIN ASPART 100 [IU]/ML
INJECTION, SOLUTION INTRAVENOUS; SUBCUTANEOUS
Qty: 60 ML | Refills: 8 | Status: SHIPPED | OUTPATIENT
Start: 2023-05-02 | End: 2023-06-19

## 2023-05-02 NOTE — TELEPHONE ENCOUNTER
"NOVOLOG VIAL 100 UNIT/ML soln     Sig: Use up to 70 units daily as directed.    Last Written Prescription Date:  3/8/22  Last Fill Quantity: 60ml,   # refills: 11  Last Office Visit: 3/27/23  Future Office visit:     Requested Prescriptions   Pending Prescriptions Disp Refills     NOVOLOG VIAL 100 UNIT/ML soln [Pharmacy Med Name: Insulin Aspart 100 UNIT/ML Injection Solution (NovoLOG)] 60 mL 10     Sig: Use up to 70 units daily as directed.       Short Acting Insulin Protocol Passed - 4/25/2023 12:57 PM        Passed - Serum creatinine on file in past 12 months     Recent Labs   Lab Test 02/03/23  1534   CR 1.34*     Ok to refill medication if creatinine is low        Passed - HgbA1C in past 3 or 6 months     If HgbA1C is 8 or greater, it needs to be on file within the past 3 months.  If less than 8, must be on file within the past 6 months.   Recent Labs   Lab Test 02/03/23  1534   A1C 7.2*           Passed - Medication is active on med list        Passed - Patient is age 18 or older        Passed - Recent (6 mo) or future (30 days) visit within the authorizing provider's specialty     Patient had office visit in the last 6 months or has a visit in the next 30 days with authorizing provider or within the authorizing provider's specialty.  See \"Patient Info\" tab in inbasket, or \"Choose Columns\" in Meds & Orders section of the refill encounter.           Medication not on RN protocol , Unable to complete prescription refill per RN Medication Refill Policy.................... Kailee Zamudio RN ....................  5/2/2023   9:54 AM                "

## 2023-05-07 DIAGNOSIS — N18.31 STAGE 3A CHRONIC KIDNEY DISEASE (H): ICD-10-CM

## 2023-05-09 RX ORDER — ERGOCALCIFEROL 1.25 MG/1
CAPSULE, LIQUID FILLED ORAL
Qty: 12 CAPSULE | Refills: 2 | Status: SHIPPED | OUTPATIENT
Start: 2023-05-09 | End: 2024-01-18

## 2023-05-09 NOTE — TELEPHONE ENCOUNTER
[facility] sent Rx request for the following:      Requested Prescriptions   Pending Prescriptions Disp Refills     vitamin D2 (ERGOCALCIFEROL) 73428 units (1250 mcg) capsule [Pharmacy Med Name: Vitamin D (Ergocalciferol) 1.25 MG (97802 UT) Oral Capsule] 12 capsule 4     Sig: Take 1 capsule (50,000) by mouth once a week   Last Prescription Date:   3/8/22  Last Fill Qty/Refills:         12, R-4    Last Office Visit:              3/27/23   Future Office visit:           None    Liana Gomez RN .............. 5/9/2023  2:16 PM

## 2023-05-21 DIAGNOSIS — E78.2 MIXED HYPERLIPIDEMIA: Chronic | ICD-10-CM

## 2023-05-24 NOTE — TELEPHONE ENCOUNTER
[facility] sent Rx request for the following:    Atorvastatin Calcium 40 MG Oral Tablet (Lipitor)  Last Prescription Date:   3/8/22  Last Fill Qty/Refills:         90, R-3    Last Office Visit:              3/16/23 Virtual   Future Office visit:           6/19/23    Jennifer Stark RN on 5/24/2023 at 9:18 AM

## 2023-05-25 RX ORDER — ATORVASTATIN CALCIUM 40 MG/1
40 TABLET, FILM COATED ORAL DAILY
Qty: 90 TABLET | Refills: 3 | Status: SHIPPED | OUTPATIENT
Start: 2023-05-25 | End: 2024-01-26

## 2023-06-19 ENCOUNTER — OFFICE VISIT (OUTPATIENT)
Dept: PEDIATRICS | Facility: OTHER | Age: 58
End: 2023-06-19
Attending: INTERNAL MEDICINE
Payer: COMMERCIAL

## 2023-06-19 VITALS
HEIGHT: 76 IN | TEMPERATURE: 97.2 F | BODY MASS INDEX: 31.94 KG/M2 | SYSTOLIC BLOOD PRESSURE: 138 MMHG | WEIGHT: 262.3 LBS | HEART RATE: 90 BPM | RESPIRATION RATE: 16 BRPM | DIASTOLIC BLOOD PRESSURE: 70 MMHG | OXYGEN SATURATION: 98 %

## 2023-06-19 DIAGNOSIS — L08.9 DIABETIC FOOT INFECTION (H): ICD-10-CM

## 2023-06-19 DIAGNOSIS — Z23 NEED FOR VACCINATION: ICD-10-CM

## 2023-06-19 DIAGNOSIS — S98.111A: ICD-10-CM

## 2023-06-19 DIAGNOSIS — N17.9 ACUTE RENAL FAILURE SUPERIMPOSED ON STAGE 3A CHRONIC KIDNEY DISEASE, UNSPECIFIED ACUTE RENAL FAILURE TYPE (H): ICD-10-CM

## 2023-06-19 DIAGNOSIS — E10.59 TYPE 1 DIABETES MELLITUS WITH OTHER CIRCULATORY COMPLICATION (H): Chronic | ICD-10-CM

## 2023-06-19 DIAGNOSIS — N18.31 ACUTE RENAL FAILURE SUPERIMPOSED ON STAGE 3A CHRONIC KIDNEY DISEASE, UNSPECIFIED ACUTE RENAL FAILURE TYPE (H): ICD-10-CM

## 2023-06-19 DIAGNOSIS — E10.22 TYPE 1 DIABETES MELLITUS WITH STAGE 3B CHRONIC KIDNEY DISEASE (H): Primary | Chronic | ICD-10-CM

## 2023-06-19 DIAGNOSIS — N18.32 TYPE 1 DIABETES MELLITUS WITH STAGE 3B CHRONIC KIDNEY DISEASE (H): Primary | Chronic | ICD-10-CM

## 2023-06-19 DIAGNOSIS — M54.50 CHRONIC MIDLINE LOW BACK PAIN WITHOUT SCIATICA: ICD-10-CM
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
## 2023-06-19 DIAGNOSIS — Z96.41 INSULIN PUMP IN PLACE: Chronic | ICD-10-CM

## 2023-06-19 DIAGNOSIS — E11.628 DIABETIC FOOT INFECTION (H): ICD-10-CM

## 2023-06-19 DIAGNOSIS — G89.29 CHRONIC MIDLINE LOW BACK PAIN WITHOUT SCIATICA: ICD-10-CM

## 2023-06-19 DIAGNOSIS — E10.8 TYPE 1 DIABETES MELLITUS WITH UNSPECIFIED COMPLICATIONS (H): ICD-10-CM

## 2023-06-19 LAB
ANION GAP SERPL CALCULATED.3IONS-SCNC: 10 MMOL/L (ref 7–15)
BUN SERPL-MCNC: 49.9 MG/DL (ref 6–20)
CALCIUM SERPL-MCNC: 9.4 MG/DL (ref 8.6–10)
CHLORIDE SERPL-SCNC: 99 MMOL/L (ref 98–107)
CREAT SERPL-MCNC: 2.4 MG/DL (ref 0.67–1.17)
DEPRECATED HCO3 PLAS-SCNC: 27 MMOL/L (ref 22–29)
GFR SERPL CREATININE-BSD FRML MDRD: 31 ML/MIN/1.73M2
GLUCOSE SERPL-MCNC: 278 MG/DL (ref 70–99)
HBA1C MFR BLD: 7.3 % (ref 4–6.2)
POTASSIUM SERPL-SCNC: 4.6 MMOL/L (ref 3.4–5.3)
SODIUM SERPL-SCNC: 136 MMOL/L (ref 136–145)

## 2023-06-19 PROCEDURE — 83036 HEMOGLOBIN GLYCOSYLATED A1C: CPT | Mod: ZL | Performed by: INTERNAL MEDICINE

## 2023-06-19 PROCEDURE — 0124A COVID-19 BIVALENT 12+ (PFIZER): CPT | Performed by: INTERNAL MEDICINE

## 2023-06-19 PROCEDURE — 90471 IMMUNIZATION ADMIN: CPT | Performed by: INTERNAL MEDICINE

## 2023-06-19 PROCEDURE — 91312 COVID-19 BIVALENT 12+ (PFIZER): CPT | Performed by: INTERNAL MEDICINE

## 2023-06-19 PROCEDURE — 80048 BASIC METABOLIC PNL TOTAL CA: CPT | Mod: ZL | Performed by: INTERNAL MEDICINE

## 2023-06-19 PROCEDURE — 36415 COLL VENOUS BLD VENIPUNCTURE: CPT | Mod: ZL | Performed by: INTERNAL MEDICINE

## 2023-06-19 PROCEDURE — 90715 TDAP VACCINE 7 YRS/> IM: CPT | Performed by: INTERNAL MEDICINE

## 2023-06-19 PROCEDURE — 99214 OFFICE O/P EST MOD 30 MIN: CPT | Mod: 25 | Performed by: INTERNAL MEDICINE

## 2023-06-19 ASSESSMENT — PAIN SCALES - GENERAL: PAINLEVEL: MILD PAIN (2)

## 2023-06-19 NOTE — NURSING NOTE
"Chief Complaint   Patient presents with     Diabetes     Back Pain         Initial /70   Pulse 90   Temp 97.2  F (36.2  C) (Tympanic)   Resp 16   Ht 1.93 m (6' 4\")   Wt 119 kg (262 lb 4.8 oz)   SpO2 98%   BMI 31.93 kg/m   Estimated body mass index is 31.93 kg/m  as calculated from the following:    Height as of this encounter: 1.93 m (6' 4\").    Weight as of this encounter: 119 kg (262 lb 4.8 oz).         Norma J. Gosselin, LPN   "

## 2023-06-19 NOTE — PROGRESS NOTES
Assessment & Plan   1. Type 1 diabetes mellitus with stage 3b chronic kidney disease (H)  Currently his diabetes is well controlled.  He has had wide fluctuation in glycemic control in the past however using CGM has been a very useful tool for him.  He anticipates his A1c will again be in the well-controlled range.  - Hemoglobin A1c; Future  - Basic metabolic panel; Future    2. Type 1 diabetes mellitus with other circulatory complication (H)  3. Insulin pump in place  4. Hx diabetic foot infection  5. Lower limb amputation, great toe, right (H)  6. Type 1 diabetes mellitus with unspecified complications (H)  At goal, no change.  - insulin aspart (NOVOPEN ECHO) 100 UNIT/ML cartridge; Use with insulin pump. Max 70 units/day.  Dispense: 60 mL; Refill: 11    7. Fall, initial encounter  8. Chronic midline low back pain without sciatica  It seems as though he had he has healed from this fall at work.  Differential diagnosis includes lumbar disc disease, lumbar compression fracture, others.  Since symptoms are improving no intervention is taken at this time.  However warning signs were discussed and prompt repeat evaluation recommended if symptoms persist or worsen.    9. Need for vaccination  COVID BiV #2 and Tdap today      Return in about 3 months (around 9/19/2023), or if symptoms worsen or fail to improve, for med management.    Signed, Tello Pro MD, FAAP, FACP  Internal Medicine & Pediatrics    Subjective   Clemente Graham is a 58 year old male who presents for obese check, back pain.  A couple of months ago he fell at work.  He was helping to unload a roughly 70 pound cart from a vehicle when he fell backward landing on a cardboard box holding the 70 pound object.  He was having some discomfort in his low back in the area of the kidneys.  He was worried he may have injured his kidneys.  However now his back pain is feeling quite a bit better.  No foot drop.  No saddle anesthesia.  No fecal or urinary  "incontinence.  His blood sugars have been looking great.  He does a good job when he is able to use his continuous glucose monitor.    Objective   Vitals: /70   Pulse 90   Temp 97.2  F (36.2  C) (Tympanic)   Resp 16   Ht 1.93 m (6' 4\")   Wt 119 kg (262 lb 4.8 oz)   SpO2 98%   BMI 31.93 kg/m      Back: No midline tenderness to palpation or percussion.    Review and Analysis of Data   I personally reviewed the following:  External notes: No  Results: Yes Diabetic labs reviewed  Use of an independent historian: No  Independent review of a test performed by another physician: No  Discussion of management with another physician: No  Moderate risk of morbidity from additional diagnostic testing and/or treatment.     Patient currently uses a Dexcom G6 CGM for continuous monitoring of glucose.   Patient injects or boluses insulin 3 or more times daily before breakfast, before lunch, before dinner, and bedtime.  Patient requires frequent adjustments to their insulin treatment regimen on the basis of therapeutic CGM testing results.      "

## 2023-06-30 ENCOUNTER — HOSPITAL ENCOUNTER (EMERGENCY)
Facility: OTHER | Age: 58
Discharge: HOME | End: 2023-06-30
Attending: INTERNAL MEDICINE
Payer: COMMERCIAL

## 2023-06-30 ENCOUNTER — NURSE TRIAGE (OUTPATIENT)
Dept: PEDIATRICS | Facility: OTHER | Age: 58
End: 2023-06-30

## 2023-06-30 ENCOUNTER — TELEPHONE (OUTPATIENT)
Dept: PEDIATRICS | Facility: OTHER | Age: 58
End: 2023-06-30

## 2023-06-30 ENCOUNTER — LAB (OUTPATIENT)
Dept: LAB | Facility: OTHER | Age: 58
End: 2023-06-30
Attending: INTERNAL MEDICINE
Payer: COMMERCIAL

## 2023-06-30 DIAGNOSIS — N17.9 ACUTE RENAL FAILURE SUPERIMPOSED ON STAGE 3A CHRONIC KIDNEY DISEASE, UNSPECIFIED ACUTE RENAL FAILURE TYPE (H): ICD-10-CM

## 2023-06-30 DIAGNOSIS — N18.31 ACUTE RENAL FAILURE SUPERIMPOSED ON STAGE 3A CHRONIC KIDNEY DISEASE, UNSPECIFIED ACUTE RENAL FAILURE TYPE (H): ICD-10-CM

## 2023-06-30 LAB
ANION GAP SERPL CALCULATED.3IONS-SCNC: 10 MMOL/L (ref 7–15)
BUN SERPL-MCNC: 35.8 MG/DL (ref 6–20)
CALCIUM SERPL-MCNC: 9.5 MG/DL (ref 8.6–10)
CHLORIDE SERPL-SCNC: 105 MMOL/L (ref 98–107)
CREAT SERPL-MCNC: 1.81 MG/DL (ref 0.67–1.17)
CREAT UR-MCNC: 97.5 MG/DL
DEPRECATED HCO3 PLAS-SCNC: 21 MMOL/L (ref 22–29)
GFR SERPL CREATININE-BSD FRML MDRD: 43 ML/MIN/1.73M2
GLUCOSE SERPL-MCNC: 218 MG/DL (ref 70–99)
HOLD SPECIMEN: NORMAL
MICROALBUMIN UR-MCNC: 2450.6 MG/L
MICROALBUMIN/CREAT UR: 2513.44 MG/G CR (ref 0–17)
POTASSIUM SERPL-SCNC: 5.5 MMOL/L (ref 3.4–5.3)
SODIUM SERPL-SCNC: 136 MMOL/L (ref 136–145)

## 2023-06-30 PROCEDURE — 36415 COLL VENOUS BLD VENIPUNCTURE: CPT | Mod: ZL

## 2023-06-30 PROCEDURE — 82570 ASSAY OF URINE CREATININE: CPT | Mod: ZL | Performed by: INTERNAL MEDICINE

## 2023-06-30 PROCEDURE — 80048 BASIC METABOLIC PNL TOTAL CA: CPT | Mod: ZL

## 2023-06-30 NOTE — TELEPHONE ENCOUNTER
"Please see triage note from prior to this phone call stating:    \"Patient notified of below. No appointments in clinic. He will present to ER in two hours. ER notified. Juli Bolden RN on 6/30/2023 at 11:16 AM\"    Writer placed call to patient and message was left reminding him that it is recommended he present to the ER. Several encounters opened regarding same concern    Corinne R Thayer, RN on 6/30/2023 at 2:24 PM    "

## 2023-06-30 NOTE — TELEPHONE ENCOUNTER
Patient notified of below. No appointments in clinic. He will present to ER in two hours. ER notified. Juli Bolden, RN on 6/30/2023 at 11:16 AM       Corinne R Thayer, RN   6/30/2023 11:06 AM CDT Back to Top      Message left with patient to return call and ask to speak with a triage nurse in regards to provider's note below     Corinne R Thayer, RN on 6/30/2023 at 11:05 AM    Tello Pro MD   6/30/2023 10:57 AM CDT       Please call Mr. Graham.  Kidney function is worse, he needs to be seen today.  May have to come to ER.     Signed, Tello Pro MD, FAAP, FACP  Internal Medicine & Pediatrics

## 2023-06-30 NOTE — TELEPHONE ENCOUNTER
"Slim Merida MD Jarva, Katie L, YANIRA; Tello Pro MD  Creatinine is better from 11 days ago, he was notified of this information and plans to follow-up in clinic next week with Dr. Pro.     He is not coming into the ER today. States he \"feels fine\".     Electronically signed by:   Slim Merida MD  on June 30, 2023 12:38 PM       "

## 2023-06-30 NOTE — TELEPHONE ENCOUNTER
Discussed with Dr. Merida in ER who reviewed patient's results and advised him to instead follow-up in clinic. Message was sent to Dr. Pro as HARLEEN. Juli Bolden RN on 6/30/2023 at 3:54 PM

## 2023-06-30 NOTE — TELEPHONE ENCOUNTER
Reason for call: Patient wanting a work in appointment.    Is the appointment for a Hospital Follow up?  No    (If yes - Unable to find an appointment with any provider during the time frame needed. Nurse/Provider - Can this patient be worked into a schedule with PCP or team member?)    Patient is having the following symptoms:  Lab follow up     The patient is requesting an appointment with  [facility]    Was an appointment offered for this call? Yes    If Yes, what is the date of the appointment?  8/10     Preferred method for responding to this message: Telephone Call    Phone number patient can be reached at? Home number on file [phone] (home)    If we can't reach you directly, may we leave a detailed response at the number you provided? Yes    Can this message wait until your PCP/provider returns if unavailable today? No

## 2023-08-10 ENCOUNTER — OFFICE VISIT (OUTPATIENT)
Dept: PEDIATRICS | Facility: OTHER | Age: 58
End: 2023-08-10
Attending: INTERNAL MEDICINE
Payer: COMMERCIAL

## 2023-08-10 VITALS
HEIGHT: 76 IN | TEMPERATURE: 97.9 F | DIASTOLIC BLOOD PRESSURE: 62 MMHG | RESPIRATION RATE: 18 BRPM | WEIGHT: 269.4 LBS | HEART RATE: 82 BPM | OXYGEN SATURATION: 98 % | SYSTOLIC BLOOD PRESSURE: 128 MMHG | BODY MASS INDEX: 32.81 KG/M2

## 2023-08-10 DIAGNOSIS — E11.29 DIABETES MELLITUS WITH PROTEINURIA (H): Primary | ICD-10-CM

## 2023-08-10 DIAGNOSIS — R80.9 DIABETES MELLITUS WITH PROTEINURIA (H): Primary | ICD-10-CM

## 2023-08-10 DIAGNOSIS — N18.4 CHRONIC KIDNEY DISEASE, STAGE IV (SEVERE) (H): ICD-10-CM

## 2023-08-10 DIAGNOSIS — K21.9 GASTROESOPHAGEAL REFLUX DISEASE WITHOUT ESOPHAGITIS: ICD-10-CM

## 2023-08-10 DIAGNOSIS — E66.09 NON MORBID OBESITY DUE TO EXCESS CALORIES: ICD-10-CM

## 2023-08-10 DIAGNOSIS — I10 BENIGN ESSENTIAL HYPERTENSION: ICD-10-CM

## 2023-08-10 DIAGNOSIS — E10.59 TYPE 1 DIABETES MELLITUS WITH OTHER CIRCULATORY COMPLICATION (H): ICD-10-CM

## 2023-08-10 LAB
ANION GAP SERPL CALCULATED.3IONS-SCNC: 9 MMOL/L (ref 7–15)
BUN SERPL-MCNC: 39.5 MG/DL (ref 6–20)
CALCIUM SERPL-MCNC: 9.3 MG/DL (ref 8.6–10)
CHLORIDE SERPL-SCNC: 105 MMOL/L (ref 98–107)
CREAT SERPL-MCNC: 1.89 MG/DL (ref 0.67–1.17)
DEPRECATED HCO3 PLAS-SCNC: 24 MMOL/L (ref 22–29)
GFR SERPL CREATININE-BSD FRML MDRD: 41 ML/MIN/1.73M2
GLUCOSE SERPL-MCNC: 212 MG/DL (ref 70–99)
POTASSIUM SERPL-SCNC: 4.7 MMOL/L (ref 3.4–5.3)
SODIUM SERPL-SCNC: 138 MMOL/L (ref 136–145)

## 2023-08-10 PROCEDURE — 36415 COLL VENOUS BLD VENIPUNCTURE: CPT | Mod: ZL | Performed by: INTERNAL MEDICINE

## 2023-08-10 PROCEDURE — 99214 OFFICE O/P EST MOD 30 MIN: CPT | Performed by: INTERNAL MEDICINE

## 2023-08-10 PROCEDURE — 80048 BASIC METABOLIC PNL TOTAL CA: CPT | Mod: ZL | Performed by: INTERNAL MEDICINE

## 2023-08-10 RX ORDER — LISINOPRIL 20 MG/1
20 TABLET ORAL 2 TIMES DAILY
Qty: 180 TABLET | Refills: 3 | Status: SHIPPED | OUTPATIENT
Start: 2023-08-10 | End: 2023-11-10

## 2023-08-10 RX ORDER — HYDRALAZINE HYDROCHLORIDE 50 MG/1
25 TABLET, FILM COATED ORAL 3 TIMES DAILY PRN
Qty: 180 TABLET | Refills: 3 | Status: SHIPPED | OUTPATIENT
Start: 2023-08-10 | End: 2024-05-09

## 2023-08-10 RX ORDER — FAMOTIDINE 20 MG/1
20 TABLET, FILM COATED ORAL 2 TIMES DAILY
Qty: 180 TABLET | Refills: 3 | Status: SHIPPED | OUTPATIENT
Start: 2023-08-10 | End: 2024-06-26

## 2023-08-10 RX ORDER — FUROSEMIDE 20 MG
20 TABLET ORAL 2 TIMES DAILY
Qty: 180 TABLET | Refills: 3 | Status: SHIPPED | OUTPATIENT
Start: 2023-08-10 | End: 2023-10-17

## 2023-08-10 ASSESSMENT — PAIN SCALES - GENERAL: PAINLEVEL: NO PAIN (0)

## 2023-08-10 NOTE — PROGRESS NOTES
"Assessment & Plan       ICD-10-CM    1. Diabetes mellitus with proteinuria (H)  E11.29 Adult Nephrology  Referral    R80.9 Basic metabolic panel     Basic metabolic panel      2. Type 1 diabetes mellitus with other circulatory complication (H)  E10.59 AMB Adult Diabetes Educator Referral     hydrALAZINE (APRESOLINE) 50 MG tablet     furosemide (LASIX) 20 MG tablet      3. Benign essential hypertension  I10 lisinopril (ZESTRIL) 20 MG tablet     hydrALAZINE (APRESOLINE) 50 MG tablet     furosemide (LASIX) 20 MG tablet     Basic metabolic panel     Basic metabolic panel      4. Non morbid obesity due to excess calories  E66.09 hydrALAZINE (APRESOLINE) 50 MG tablet     furosemide (LASIX) 20 MG tablet      5. Gastroesophageal reflux disease without esophagitis  K21.9 famotidine (PEPCID) 20 MG tablet      6. Chronic kidney disease, stage IV (severe) (H)  N18.4         Diabetes seems to be the best controlled when he uses the CGM.  Significant proteinuria persists.  Kidney function is reduced but stable.  We discussed the possibility of reducing the furosemide doses further and decided against it at this time.     --Recommend nephrology consultation   -- Recommend diabetes education consultation for assistance with insulin pump management and CGM.    Return in about 3 months (around 11/10/2023) for diabetes, kidney disease.    Signed, Tello Pro MD, FAAP, FACP  Internal Medicine & Pediatrics    Subjective   Clemente Graham is a 58 year old male who presents for follow-up lab.  He has been using CGM.  Glucoses have been well-controlled.  No lower extremity edema    Objective   Vitals: /62 (BP Location: Right arm, Patient Position: Sitting, Cuff Size: Adult Regular)   Pulse 82   Temp 97.9  F (36.6  C) (Temporal)   Resp 18   Ht 1.93 m (6' 4\")   Wt 122.2 kg (269 lb 6.4 oz)   SpO2 98%   BMI 32.79 kg/m        Review and Analysis of Data   I personally reviewed the following:  External notes: Yes most " recent nephrology note from 2 years ago reviewed with patient today  Results: Yes most recent diabetes labs reviewed  Use of an independent historian: No  Independent review of a test performed by another physician: No  Discussion of management with another physician: No  Moderate risk of morbidity from additional diagnostic testing and/or treatment.

## 2023-08-10 NOTE — NURSING NOTE
"Chief Complaint   Patient presents with    RECHECK     LABS       Initial /62 (BP Location: Right arm, Patient Position: Sitting, Cuff Size: Adult Regular)   Pulse 82   Temp 97.9  F (36.6  C) (Temporal)   Resp 18   Ht 6' 4\" (1.93 m)   Wt 269 lb 6.4 oz (122.2 kg)   SpO2 98%   BMI 32.79 kg/m   Estimated body mass index is 32.79 kg/m  as calculated from the following:    Height as of this encounter: 6' 4\" (1.93 m).    Weight as of this encounter: 269 lb 6.4 oz (122.2 kg).  Medication Reconciliation: complete      FOOD SECURITY SCREENING QUESTIONS:    The next two questions are to help us understand your food security.  If you are feeling you need any assistance in this area, we have resources available to support you today.    Hunger Vital Signs:  Within the past 12 months we worried whether our food would run out before we got money to buy more. Never  Within the past 12 months the food we bought just didn't last and we didn't have money to get more. Never    Rochelle Kim LPN on 8/10/2023 at 8:32 AM    "

## 2023-08-17 ENCOUNTER — VIRTUAL VISIT (OUTPATIENT)
Dept: EDUCATION SERVICES | Facility: OTHER | Age: 58
End: 2023-08-17
Attending: INTERNAL MEDICINE
Payer: COMMERCIAL

## 2023-08-17 DIAGNOSIS — E10.59 TYPE 1 DIABETES MELLITUS WITH OTHER CIRCULATORY COMPLICATION (H): ICD-10-CM

## 2023-08-17 NOTE — PROGRESS NOTES
Uable to view Dexcom G6 CGM or Tandem pump information.  Patient requests to schedule visit in-person for pump and CGM download.    Appointment rescheduled for Monday, 9/11/23, 9:00 am.    Due to patient report of hypoglycemia after meals, advised patient to adjust ICR, making it less aggressive to give less insulin for CHO.  Patient adjusted his pump ICR from 1:8 grams to 1:9.5 grams.    Bindu Spring RN, BSN, [facility]  8/17/2023 8:49 AM

## 2023-09-11 ENCOUNTER — ALLIED HEALTH/NURSE VISIT (OUTPATIENT)
Dept: EDUCATION SERVICES | Facility: OTHER | Age: 58
End: 2023-09-11
Attending: INTERNAL MEDICINE
Payer: COMMERCIAL

## 2023-09-11 DIAGNOSIS — E10.43: Primary | ICD-10-CM

## 2023-09-11 PROCEDURE — G0108 DIAB MANAGE TRN  PER INDIV: HCPCS | Performed by: REGISTERED NURSE

## 2023-09-11 NOTE — PATIENT INSTRUCTIONS
Diabetes Goals and Reminders    Your A1c test should be done every 3 months.  Your goal is less than 7%.   Your last result is:  Lab Results   Component Value Date    A1C 7.3 06/19/2023    A1C 10.3 02/18/2021       Your LDL cholesterol test should be done at least once a year.    Your last result is:  LDL Cholesterol Calculated   Date Value Ref Range Status   02/03/2023 68 <=100 mg/dL Final   05/21/2020 62 <100 mg/dL Final     Comment:     Desirable:       <100 mg/dl       Have blood pressure and weight checked every three months.  Your blood pressure goal is 130/80 or less.  Your last blood pressure reading was:    BP Readings from Last 1 Encounters:   08/10/23 128/62       Use your CGM to check sensor glucose readings a minimum of 4 times per day.  Your home glucose target ranges are:  Before meals:    2 hours after a meal:  Less than 180  Bedtime:  100-140 mg/dL      Avoid all tobacco.  Follow your healthy diet and exercise plan.  See the eye doctor every year.  See the dentist every six months.  Have kidney function tested yearly.    Take all medicine as prescribed.  Please let me know if you are having symptoms you don t expect or if you wish to stop any medicine.    Current Pump settings   Pump Type:  Tandem TSlim X2 insulin pump with Control [employer]    Insulin Type: Novolog  BASAL RATES and times:  12   AM (midnight): 1.35 units/hour    4    AM: 1.25 units/hour   10:30   PM: 1.35 units/hour    Insulin to Carbohydrate Ratio (ICR):   1 unit per 8.5 grams of carbohydrate at 12:00 am.  Insulin Sensitivity:   36 mg/dl at 12:00 am.  Target Blood Glucose:   110 mg/dL.  Active Insulin: 5 hours  Total Daily Dose: 48.62  Basal %: 66   Bolus %: 34    Follow Up Plan  Your future lab plan is:  HgA1c in September 2023.    If you need your cholesterol checked at your next appointment, you should fast 8 to 10 hours before your appointment.  Do not eat or drink anything besides water.  Drink plenty of water and  take all your usual medicine.    SUMMARY FOR TODAY'S VISIT    1.  Recommended pump adjustments:    A.  Discussed ICR and ISF to help bring bolus percentage up and decrease basal, decreasing risk of low glucose.  See settings above for new ICR and ISF.  B.  To help decrease risk of fasting low glucose, recommend basal 1.25 unit/hour beginning at 4:00 am.    2.  Discussed trying to bolus BEFORE the meal to give the Novolog a head start to help decrease risk of highs after the meal.    3.  Rule of 15 for hypoglycemia discussed.  Recommend glucose juice for fastest treatment.    4.  Please follow up for continued pump management, as needed.  Check out Beta Bionics, Ilet pump for first closed-loop pump.      Bindu Spring RN, BSN, [facility], CPT  9/11/2023 10:08 AM

## 2023-09-11 NOTE — PROGRESS NOTES
Diabetes Self-Management Education & Support    Presents for:      Type of Service: In Person Visit    Assessment Type:   REPORTS:  ASSESSMENT    CGM download glucose results:    Report shows 163 mg/dl average sensor glucose over the past two weeks.    Standard deviation (SD) is +- 58 mg/dl. Goal for SD is +-50 mg/dl or lower.      GMI (Glucose Management Indicator or HbA1c ) 7.3%.    Glucose 63% in target (70 - 180), 27% high (181 - 249), 9% very high (250 - 400), 1% low (56 - 69) and 0% very low (39 - 55).      No significant patterns of highs or lows found.    Insulin Pump download:    Changing infusion set every 3.25 days.     Average daily carbohydrates: 94 grams  Average total daily insulin: 48.62 units  Basal: 66%  Bolus: 34%        INTERVENTION    PLAN:    1.  Recommended pump adjustments:      A.  Discussed ICR and ISF to help bring bolus percentage up and decrease basal, decreasing risk of low glucose.  ICR adjusted from 1:9.5 to 1:8.5 grams.  ISF adjusted from 1:40 to 1:36 mg/dL.    B.  To help decrease risk of fasting low glucose, recommend basal 1.25 unit/hour beginning at 4:00 am.    2.  Discussed trying to bolus BEFORE the meal to give the Novolog a head start to help decrease risk of highs after the meal.    3.  Rule of 15 for hypoglycemia discussed.  Recommend glucose juice for fastest treatment.        Insulin Pump Information       Education specific to insulin pump provided today on:   importance of changing infusion set every 3 days, importance of bolusing before meals, importance of counting carbohydrates accurately, and benefits of post-meal blood glucose testing     Opportunities for ongoing education and support in diabetes-self management were discussed.    Pt verbalized understanding of concepts discussed and recommendations provided today.       Continue education with the following diabetes management concepts: Healthy Eating, Being Active, Problem Solving, and Reducing  "Risks    Pump Education Materials: Information on new closed-loop insulin pump, Beta Bionics, iLet insulin pump      Topics to cover at upcoming visits: Healthy Eating, Being Active, Problem Solving, and Reducing Risks    Follow-up: TBD per patient schedule.     See Care Plan for co-developed, patient-state behavior change goals.  AVS provided for patient today.    Education Materials Provided:  Information on Beta Bionics, iLet insulin pump      SUBJECTIVE/OBJECTIVE:  Diabetes education in the past 24mo: Yes  Diabetes type: Type 1  Disease course: Stable  How confident are you filling out medical forms by yourself:: Extremely  Cultural Influences/Ethnic Background:  Not  or     Diabetes Symptoms & Complications:  Fatigue: Yes  Neuropathy: Yes  Polydipsia: No  Polyphagia: No  Polyuria: No  Visual change: Yes  Slow healing wounds: Yes  Autonomic neuropathy: Yes  CVA: No  Heart disease: No  Nephropathy: Yes  Peripheral neuropathy: Yes  Peripheral Vascular Disease: Yes  Retinopathy: Yes  Sexual dysfunction: Yes    Patient Problem List and Family Medical History reviewed for relevant medical history, current medical status, and diabetes risk factors.    Vitals:  There were no vitals taken for this visit.  Estimated body mass index is 32.79 kg/m  as calculated from the following:    Height as of 8/10/23: 1.93 m (6' 4\").    Weight as of 8/10/23: 122.2 kg (269 lb 6.4 oz).   Last 3 BP:   BP Readings from Last 3 Encounters:   08/10/23 128/62   06/19/23 138/70   03/27/23 134/64       History   Smoking Status    Never   Smokeless Tobacco    Never     Comment: no ecig       Labs:  Lab Results   Component Value Date    A1C 7.3 06/19/2023    A1C 10.3 02/18/2021     Lab Results   Component Value Date     08/10/2023    GLC 71 03/08/2022     04/08/2021     Lab Results   Component Value Date    LDL 68 02/03/2023    LDL 62 05/21/2020     HDL Cholesterol   Date Value Ref Range Status   05/21/2020 43 23 - 92 " mg/dL Final     Direct Measure HDL   Date Value Ref Range Status   02/03/2023 58 >=40 mg/dL Final   ]  GFR Estimate   Date Value Ref Range Status   08/10/2023 41 (L) >60 mL/min/1.73m2 Final   04/08/2021 37 (L) >60 mL/min/[1.73_m2] Final     GFR Estimate If Black   Date Value Ref Range Status   04/08/2021 44 (L) >60 mL/min/[1.73_m2] Final     Lab Results   Component Value Date    CR 1.89 08/10/2023    CR 1.91 04/08/2021     No results found for: MICROALBUMIN    Healthy Eating:  Cultural/Evangelical diet restrictions?: No  Meal planning/habits: Carb counting  How many times a week on average do you eat food made away from home (restaurant/take-out)?: 1  Meals include: Lunch, Dinner, Evening Snack  Beverages: Water, Milk, Diet soda    Being Active:  Barrier to exercise: None    Monitoring:  Blood Glucose Meter: CGM  Blood glucose trend: Decreasing    Taking Medications:  Diabetes Medication(s)       Diabetic Other       glucagon 1 MG kit    Inject 1 mg into the muscle as needed      Insulin       insulin aspart (NOVOPEN ECHO) 100 UNIT/ML cartridge    Use with insulin pump. Max 70 units/day.            Current Treatments: Insulin Pump  Dose schedule: (P) Pre-breakfast, Pre-lunch, Pre-dinner  Given by: (P) Patient  Injection/Infusion sites: (P) Abdomen      Reducing Risks:  CAD Risks: Diabetes Mellitus, Dyslipidemia, Family history, Hypertension  Has dilated eye exam at least once a year?: Yes  Sees dentist every 6 months?: No  Feet checked by healthcare provider in the last year?: Yes    Healthy Coping:  Informal Support system:: Family, Friends  Patient Activation Measure Survey Score:       No data to display                  Care Plan and Education Provided:  Care Plan: Diabetes   Updates made by Bindu Spring RN since 9/11/2023 12:00 AM        Problem: HbA1C Not In Goal         Goal: Establish Regular Follow-Ups with PCP         Task: Discuss with PCP the recommended timing for patient's next follow up visit(s)     Responsible User: Bindu Spring RN        Task: Discuss schedule for PCP visits with patient Completed 9/11/2023   Responsible User: Bindu Spring RN        Goal: Get HbA1C Level in Goal         Task: Educate patient on diabetes education self-management topics Completed 9/11/2023   Responsible User: Bindu Spring RN        Task: Educate patient on benefits of regular glucose monitoring    Responsible User: Bindu Spring RN        Task: Refer patient to appropriate extended care team member, as needed (Medication Therapy Management, Behavioral Health, Physical Therapy, etc.)    Responsible User: Bindu Spring RN        Task: Discuss diabetes treatment plan with patient Completed 9/11/2023   Responsible User: Bindu Spring RN        Problem: Diabetes Self-Management Education Needed to Optimize Self-Care Behaviors         Goal: Understand diabetes pathophysiology and disease progression         Task: Provide education on diabetes pathophysiology and disease progression specfic to patient's diabetes type Completed 9/11/2023   Responsible User: Bindu Spring RN        Goal: Healthy Eating - follow a healthy eating pattern for diabetes         Task: Provide education on portion control and consistency in amount, composition and timing of food intake    Responsible User: Bindu Spring RN        Task: Provide education on managing carbohydrate intake (carbohydrate counting, plate planning method, etc.) Completed 9/11/2023   Responsible User: Bindu Spring RN        Task: Provide education on weight management    Responsible User: Bindu Spring RN        Task: Provide education on heart healthy eating    Responsible User: Bindu Spring RN        Task: Provide education on eating out    Responsible User: Bindu Spring RN        Task: Develop individualized healthy eating plan with patient    Responsible User: Bindu Spring RN        Goal: Being Active - get regular physical activity,  working up to at least 150 minutes per week         Task: Provide education on relationship of activity to glucose and precautions to take if at risk for low glucose Completed 9/11/2023   Responsible User: Bindu Spring RN        Task: Discuss barriers to physical activity with patient    Responsible User: Bindu Spring RN        Task: Develop physical activity plan with patient    Responsible User: Bindu Spring RN        Task: Explore community resources including walking groups, assistance programs, and home videos    Responsible User: Bindu Spring RN        Goal: Monitoring - monitor glucose and ketones as directed         Task: Provide education on blood glucose monitoring (purpose, proper technique, frequency, glucose targets, interpreting results, when to use glucose control solution, sharps disposal)    Responsible User: Bindu Spring RN        Task: Provide education on continuous glucose monitoring (sensor placement, use of fernando or /reader, understanding glucose trends, alerts and alarms, differences between sensor glucose and blood glucose) Completed 9/11/2023   Responsible User: Bindu Spring RN        Task: Provide education on ketone monitoring (when to monitor, frequency, etc.)    Responsible User: Bindu Spring RN        Goal: Taking Medication - patient is consistently taking medications as directed         Task: Provide education on action of prescribed medication, including when to take and possible side effects Completed 9/11/2023   Responsible User: Bindu Spring RN        Task: Provide education on insulin and injectable diabetes medications, including administration, storage, site selection and rotation for injection sites Completed 9/11/2023   Responsible User: Bindu Spring RN        Task: Discuss barriers to medication adherence with patient and provide management technique ideas as appropriate    Responsible User: Bindu Spring RN        Task: Provide  education on frequency and refill details of medications    Responsible User: Bindu Spring RN        Goal: Problem Solving - know how to prevent and manage short-term diabetes complications         Task: Provide education on high blood glucose - causes, signs/symptoms, prevention and treatment Completed 9/11/2023   Responsible User: Bindu Spring RN        Task: Provide education on low blood glucose - causes, signs/symptoms, prevention, treatment, carrying a carbohydrate source at all times, and medical identification Completed 9/11/2023   Responsible User: Bindu Spring RN        Task: Provide education on safe travel with diabetes    Responsible User: Bindu Spring RN        Task: Provide education on how to care for diabetes on sick days    Responsible User: Bindu Spring RN        Task: Provide education on when to call a health care provider    Responsible User: Bindu Spring RN        Goal: Reducing Risks - know how to prevent and treat long-term diabetes complications         Task: Provide education on major complications of diabetes, prevention, early diagnostic measures and treatment of complications Completed 9/11/2023   Responsible User: Bindu Spring RN        Task: Provide education on recommended care for dental, eye and foot health    Responsible User: Bindu Spring RN        Task: Provide education on Hemoglobin A1c - goals and relationship to blood glucose levels Completed 9/11/2023   Responsible User: Bindu Spring RN        Task: Provide education on recommendations for heart health - lipid levels and goals, blood pressure and goals, and aspirin therapy, if indicated    Responsible User: Bindu Spring RN        Task: Provide education on tobacco cessation    Responsible User: Bnidu Spring RN        Goal: Healthy Coping - use available resources to cope with the challenges of managing diabetes         Task: Discuss recognizing feelings about having diabetes     Responsible User: Bindu Spring RN        Task: Provide education on the benefits of making appropriate lifestyle changes Completed 9/11/2023   Responsible User: Bindu Spring RN        Task: Provide education on benefits of utilizing support systems    Responsible User: Bindu Spring RN        Task: Discuss methods for coping with stress    Responsible User: Bindu Spring RN        Task: Provide education on when to seek professional counseling    Responsible User: Bindu Spring RN Suzette Childs, RN, BSN, [facility]  9/11/2023 9:57 AM     Time Spent: 60 minutes  Encounter Type: Individual    Any diabetes medication dose changes were made via the CDE Protocol per the patient's primary care provider. A copy of this encounter was shared with the provider.

## 2023-09-28 ENCOUNTER — OFFICE VISIT (OUTPATIENT)
Dept: FAMILY MEDICINE | Facility: OTHER | Age: 58
End: 2023-09-28
Attending: PHYSICIAN ASSISTANT
Payer: COMMERCIAL

## 2023-09-28 VITALS
HEART RATE: 85 BPM | OXYGEN SATURATION: 98 % | HEIGHT: 76 IN | RESPIRATION RATE: 14 BRPM | SYSTOLIC BLOOD PRESSURE: 132 MMHG | DIASTOLIC BLOOD PRESSURE: 86 MMHG | BODY MASS INDEX: 34.1 KG/M2 | TEMPERATURE: 97.4 F | WEIGHT: 280 LBS

## 2023-09-28 DIAGNOSIS — E10.22 TYPE 1 DIABETES MELLITUS WITH STAGE 3B CHRONIC KIDNEY DISEASE (H): ICD-10-CM

## 2023-09-28 DIAGNOSIS — N18.32 TYPE 1 DIABETES MELLITUS WITH STAGE 3B CHRONIC KIDNEY DISEASE (H): ICD-10-CM

## 2023-09-28 DIAGNOSIS — R60.0 BILATERAL LOWER EXTREMITY EDEMA: Primary | ICD-10-CM

## 2023-09-28 LAB
ALBUMIN SERPL BCG-MCNC: 3 G/DL (ref 3.5–5.2)
ALP SERPL-CCNC: 88 U/L (ref 40–129)
ALT SERPL W P-5'-P-CCNC: 32 U/L (ref 0–70)
ANION GAP SERPL CALCULATED.3IONS-SCNC: 8 MMOL/L (ref 7–15)
AST SERPL W P-5'-P-CCNC: 32 U/L (ref 0–45)
BILIRUB DIRECT SERPL-MCNC: <0.2 MG/DL (ref 0–0.3)
BILIRUB SERPL-MCNC: 0.3 MG/DL
BUN SERPL-MCNC: 28.3 MG/DL (ref 6–20)
CALCIUM SERPL-MCNC: 9 MG/DL (ref 8.6–10)
CHLORIDE SERPL-SCNC: 107 MMOL/L (ref 98–107)
CREAT SERPL-MCNC: 2.19 MG/DL (ref 0.67–1.17)
EGFRCR SERPLBLD CKD-EPI 2021: 34 ML/MIN/1.73M2
GLUCOSE SERPL-MCNC: 135 MG/DL (ref 70–99)
HCO3 SERPL-SCNC: 26 MMOL/L (ref 22–29)
HOLD SPECIMEN: NORMAL
NT-PROBNP SERPL-MCNC: 326 PG/ML (ref 0–900)
POTASSIUM SERPL-SCNC: 4 MMOL/L (ref 3.4–5.3)
PROT SERPL-MCNC: 5.8 G/DL (ref 6.4–8.3)
SODIUM SERPL-SCNC: 141 MMOL/L (ref 135–145)
TSH SERPL DL<=0.005 MIU/L-ACNC: 2.05 UIU/ML (ref 0.3–4.2)

## 2023-09-28 PROCEDURE — 82248 BILIRUBIN DIRECT: CPT | Mod: ZL | Performed by: PHYSICIAN ASSISTANT

## 2023-09-28 PROCEDURE — 83880 ASSAY OF NATRIURETIC PEPTIDE: CPT | Mod: ZL | Performed by: PHYSICIAN ASSISTANT

## 2023-09-28 PROCEDURE — 84443 ASSAY THYROID STIM HORMONE: CPT | Mod: ZL | Performed by: PHYSICIAN ASSISTANT

## 2023-09-28 PROCEDURE — 80053 COMPREHEN METABOLIC PANEL: CPT | Mod: ZL | Performed by: PHYSICIAN ASSISTANT

## 2023-09-28 PROCEDURE — 99214 OFFICE O/P EST MOD 30 MIN: CPT | Performed by: PHYSICIAN ASSISTANT

## 2023-09-28 PROCEDURE — 36415 COLL VENOUS BLD VENIPUNCTURE: CPT | Mod: ZL | Performed by: PHYSICIAN ASSISTANT

## 2023-09-28 RX ORDER — HYDRALAZINE HYDROCHLORIDE 25 MG/1
TABLET, FILM COATED ORAL
COMMUNITY
Start: 2023-08-15 | End: 2023-11-10

## 2023-09-28 RX ORDER — INSULIN ASPART 100 [IU]/ML
INJECTION, SOLUTION INTRAVENOUS; SUBCUTANEOUS
COMMUNITY
Start: 2023-09-20 | End: 2024-08-14 | Stop reason: ALTCHOICE

## 2023-09-28 ASSESSMENT — PAIN SCALES - GENERAL: PAINLEVEL: NO PAIN (0)

## 2023-09-28 NOTE — NURSING NOTE
"Chief Complaint   Patient presents with    Edema     Leg swelling - 1 month       Initial /86 (BP Location: Right arm, Patient Position: Sitting, Cuff Size: Adult Regular)   Pulse 85   Temp 97.4  F (36.3  C) (Temporal)   Resp 14   Ht 1.93 m (6' 4\")   Wt 127 kg (280 lb)   SpO2 98%   BMI 34.08 kg/m   Estimated body mass index is 34.08 kg/m  as calculated from the following:    Height as of this encounter: 1.93 m (6' 4\").    Weight as of this encounter: 127 kg (280 lb).    Medication Reconciliation: complete      Advance care plan reviewed      Jadyn Pope LPN on 9/28/2023 at 9:22 AM      "

## 2023-09-28 NOTE — PROGRESS NOTES
Assessment & Plan       ICD-10-CM    1. Bilateral lower extremity edema  R60.0 TSH Reflex GH     Brain Natriuretic Peptide (BNP)     Hepatic Function Panel     Compression Sleeve/Stocking Order for DME - ONLY FOR DME     Basic metabolic panel     Extra Purple Top EDTA (LAB USE ONLY)     UA Macroscopic with reflex to Microscopic and Culture     CANCELED: Basic Metabolic Panel     CANCELED: UA Macroscopic with reflex to Microscopic and Culture      2. Type 1 diabetes mellitus with stage 3b chronic kidney disease (H)  E10.22 UA Macroscopic with reflex to Microscopic and Culture    N18.32 CANCELED: Basic metabolic panel        Vital signs are stable, blood pressure of 132/86, pulse of 85, afebrile with temperature of 97.4  F.  1-2+ nonpitting edema to the level of the mid to distal shins.  Lab work was updated today, renal function is mildly diminished, creatinine is up to 2.19 today (this is an increase from 1.89 on 8/10/2023) and GFR is down slightly to 34 today (this is decreased from 41 on 8/10/2023).  TSH is normal at 2.05.  proBNP is normal at 326.  Patient will return a urine sample to reassess for proteinuria at his convenience as he was unable to give a sample during her visit today.  I did review lab work and treatment plan with one of my colleagues who is in agreement with the following, decrease Lasix back down to normal dosing of 20 mg twice daily (I did review with patient to discuss any future dose increases primary care provider to assure these are safe versus increasing on his own), he is in agreement with this.  I also reiterated the importance of a low-salt diet.  He was measured for compression stockings while in the office today, DME order was placed, I am hopeful that he will be able to receive these shortly, I do recommend he wear compression stockings on a daily basis.  Strict return precautions reviewed. Patient is in agreement and understanding of the above treatment plan. All questions and  "concerns were addressed and answered to patient's satisfaction. AVS reviewed with patient.   Continue to focus on healthy diet and exercise regimen as this will help with diabetic management/control.  He does have a visit with nephrology on 10/27/2023 via [facility] in [place], encouraged him to keep this visit.    BMI:   Estimated body mass index is 34.08 kg/m  as calculated from the following:    Height as of this encounter: 1.93 m (6' 4\").    Weight as of this encounter: 127 kg (280 lb).   Weight management plan: Discussed healthy diet and exercise guidelines    See Patient Instructions    Return if symptoms worsen or fail to improve.    Jennifer Hayes PA-C  [facility]    Subjective   Juan is a 58 year old, presenting for the following health issues:  Edema (Leg swelling - 1 month)        9/28/2023     9:18 AM   Additional Questions   Roomed by Jadyn RAZO LPN       History of Present Illness       Reason for visit:  Swelling in legs    He eats 0-1 servings of fruits and vegetables daily.He consumes 0 sweetened beverage(s) daily.He exercises with enough effort to increase his heart rate 20 to 29 minutes per day.  He exercises with enough effort to increase his heart rate 4 days per week.   He is taking medications regularly.     Juan presents to the clinic today with a 1 month history of bilateral lower leg edema.  He has a history of similar symptoms in the past, most recent episode was approximately 1 year ago which self resolved.  He declines any pitting edema.  Swelling is from the level of the ankles to mid thigh report.  This is nontender.  He declines any skin pigmentation or temperature changes.  He declines any recent travel.  No tobacco use.  No recent surgeries or immobilization.  No history of DVT or PE. He also had x1 pair of compression stockings in the past, this helped with swelling, however, as these wore out a long time ago, he does not wear on a regular basis. He did " "purchase a pair of over the counter compression stockings within the last few days, no noticeable changes yet. Occasional elevation of feet (not routinely above heart level).     Current medications are available for review in chart, he is currently on furosemide 20 mg twice daily, he self increased this to 40 mg twice daily last week to try to help with swelling, he noted no improvement.  He declines chest pain but does report some intermittent shortness of breath from time to time.  He does have known renal disease and has a visit set up with nephrology on 10/27/2023.  He also follows closely with his primary care provider for type 1 diabetes.  He declines any falls, injuries and/or trauma.  No history of chronic venous disease or insufficiency, pulmonary hypertension or CHF to his knowledge.    No recent changes to medications other than that listed above.  He follows a low-salt diet.    He declines any headaches, vision changes, centralized edema, mentation changes, neck pain, seizures or other pertinent symptomatology.  No current or recent illness symptoms.    Review of Systems   Constitutional, HEENT, cardiovascular, pulmonary, GI, , musculoskeletal, neuro, skin, endocrine and psych systems are negative, except as otherwise noted.      Objective    /86 (BP Location: Right arm, Patient Position: Sitting, Cuff Size: Adult Regular)   Pulse 85   Temp 97.4  F (36.3  C) (Temporal)   Resp 14   Ht 1.93 m (6' 4\")   Wt 127 kg (280 lb)   SpO2 98%   BMI 34.08 kg/m    Body mass index is 34.08 kg/m .  Physical Exam   GENERAL: healthy, alert and no distress  EYES: Eyes grossly normal to inspection, PERRL and conjunctivae and sclerae normal  NECK: no adenopathy, no asymmetry, masses, or scars and thyroid normal to palpation  RESP: lungs clear to auscultation - no rales, rhonchi or wheezes  CV: regular rates and rhythm, normal S1 S2, no S3 or S4, no murmur, click or rub, peripheral pulses strong, 1-2+ bilateral " lower extremity nonpitting edema to level of distal shins , no bruits heard, and color normal (non cyanotic to upper and lower extremities). Normal peripheral pulses (radial, dorsalis pedis, posterior tibial) bilaterally  ABDOMEN: soft, nontender, no hepatosplenomegaly, no masses and bowel sounds normal  SKIN: no suspicious lesions or rashes  PSYCH: mentation appears normal, affect normal/bright  LYMPH: normal ant/post cervical, supraclavicular nodes    Results for orders placed or performed in visit on 09/28/23   TSH Reflex GH     Status: Normal   Result Value Ref Range    TSH 2.05 0.30 - 4.20 uIU/mL   Brain Natriuretic Peptide (BNP)     Status: Normal   Result Value Ref Range    N Terminal Pro BNP Outpatient 326 0 - 900 pg/mL   Hepatic Function Panel     Status: Abnormal   Result Value Ref Range    Protein Total 5.8 (L) 6.4 - 8.3 g/dL    Albumin 3.0 (L) 3.5 - 5.2 g/dL    Bilirubin Total 0.3 <=1.2 mg/dL    Alkaline Phosphatase 88 40 - 129 U/L    AST 32 0 - 45 U/L    ALT 32 0 - 70 U/L    Bilirubin Direct <0.20 0.00 - 0.30 mg/dL   Basic metabolic panel     Status: Abnormal   Result Value Ref Range    Sodium 141 135 - 145 mmol/L    Potassium 4.0 3.4 - 5.3 mmol/L    Chloride 107 98 - 107 mmol/L    Carbon Dioxide (CO2) 26 22 - 29 mmol/L    Anion Gap 8 7 - 15 mmol/L    Urea Nitrogen 28.3 (H) 6.0 - 20.0 mg/dL    Creatinine 2.19 (H) 0.67 - 1.17 mg/dL    GFR Estimate 34 (L) >60 mL/min/1.73m2    Calcium 9.0 8.6 - 10.0 mg/dL    Glucose 135 (H) 70 - 99 mg/dL   Extra Purple Top EDTA (LAB USE ONLY)     Status: None   Result Value Ref Range    Hold Specimen JI

## 2023-09-28 NOTE — PATIENT INSTRUCTIONS
Lab work on average will return in 1-2 hours, unless listed otherwise below (your provider will typically review & provide you with results and recommendations within 1 day):  - CBC - blood counts  - CMP/BMP - kidney and electrolyte lab. CMP adds in liver function  - TSH - thyroid lab  - Urine - checking for protein and sugar/glucose in the urine

## 2023-10-13 ENCOUNTER — TRANSFERRED RECORDS (OUTPATIENT)
Dept: HEALTH INFORMATION MANAGEMENT | Facility: OTHER | Age: 58
End: 2023-10-13
Payer: COMMERCIAL

## 2023-10-13 LAB — RETINOPATHY: POSITIVE

## 2023-10-16 ENCOUNTER — NURSE TRIAGE (OUTPATIENT)
Dept: PEDIATRICS | Facility: OTHER | Age: 58
End: 2023-10-16
Payer: COMMERCIAL

## 2023-10-16 NOTE — TELEPHONE ENCOUNTER
"Checking with Dr. Pro's nurse as to whether he would allow tomorrow's Same Day for this. Juli Bolden RN on 10/16/2023 at 2:46 PM      Reason for Disposition   SEVERE swelling (e.g., swelling extends above knee, entire leg is swollen, weeping fluid)    Additional Information   Negative: Sounds like a life-threatening emergency to the triager   Negative: Chest pain   Negative: Followed an insect bite and has localized swelling (e.g., small area of puffy or swollen skin)   Negative: Followed a knee injury   Negative: Ankle or foot injury   Negative: Pregnant with leg swelling or edema   Negative: Difficulty breathing at rest   Negative: Entire foot is cool or blue in comparison to other side    Answer Assessment - Initial Assessment Questions  1. ONSET: \"When did the swelling start?\" (e.g., minutes, hours, days)      A month and a half ago     2. LOCATION: \"What part of the leg is swollen?\"  \"Are both legs swollen or just one leg?\"      Both legs, both hands, noticeable in the face as well     3. SEVERITY: \"How bad is the swelling?\" (e.g., localized; mild, moderate, severe)    - Localized: Small area of swelling localized to one leg.    - MILD pedal edema: Swelling limited to foot and ankle, pitting edema < 1/4 inch (6 mm) deep, rest and elevation eliminate most or all swelling.    - MODERATE edema: Swelling of lower leg to knee, pitting edema > 1/4 inch (6 mm) deep, rest and elevation only partially reduce swelling.    - SEVERE edema: Swelling extends above knee, facial or hand swelling present.       Severe     4. REDNESS: \"Does the swelling look red or infected?\"      Denies     5. PAIN: \"Is the swelling painful to touch?\" If Yes, ask: \"How painful is it?\"   (Scale 1-10; mild, moderate or severe)      Difficulty getting around and bending, affecting his ability to do things he normally does, uncomfortable     6. FEVER: \"Do you have a fever?\" If Yes, ask: \"What is it, how was it measured, and when did it " "start?\"       Denies     7. CAUSE: \"What do you think is causing the leg swelling?\"      Wife thinks he needs a lasix increase     8. MEDICAL HISTORY: \"Do you have a history of blood clots (e.g., DVT), cancer, heart failure, kidney disease, or liver failure?\"      Yes    9. RECURRENT SYMPTOM: \"Have you had leg swelling before?\" If Yes, ask: \"When was the last time?\" \"What happened that time?\"      This has happened before     10. OTHER SYMPTOMS: \"Do you have any other symptoms?\" (e.g., chest pain, difficulty breathing)        Wife thinks this is affecting his breathing, mild but noticeable to her    11. PREGNANCY: \"Is there any chance you are pregnant?\" \"When was your last menstrual period?\"        no    Protocols used: Leg Swelling and Edema-A-OH    "

## 2023-10-17 ENCOUNTER — OFFICE VISIT (OUTPATIENT)
Dept: PEDIATRICS | Facility: OTHER | Age: 58
End: 2023-10-17
Attending: INTERNAL MEDICINE
Payer: COMMERCIAL

## 2023-10-17 VITALS
OXYGEN SATURATION: 98 % | BODY MASS INDEX: 36.22 KG/M2 | HEART RATE: 90 BPM | TEMPERATURE: 97.8 F | DIASTOLIC BLOOD PRESSURE: 90 MMHG | WEIGHT: 297.4 LBS | RESPIRATION RATE: 16 BRPM | SYSTOLIC BLOOD PRESSURE: 182 MMHG | HEIGHT: 76 IN

## 2023-10-17 DIAGNOSIS — E10.43: ICD-10-CM

## 2023-10-17 DIAGNOSIS — Z46.6 ENCOUNTER FOR URINARY CATHETER: ICD-10-CM

## 2023-10-17 DIAGNOSIS — N18.4 CHRONIC KIDNEY DISEASE, STAGE IV (SEVERE) (H): ICD-10-CM

## 2023-10-17 DIAGNOSIS — I87.8 VENOUS STASIS OF BOTH LOWER EXTREMITIES: ICD-10-CM

## 2023-10-17 DIAGNOSIS — E66.09 NON MORBID OBESITY DUE TO EXCESS CALORIES: ICD-10-CM

## 2023-10-17 DIAGNOSIS — I50.33 ACUTE ON CHRONIC HEART FAILURE WITH PRESERVED EJECTION FRACTION (H): Primary | ICD-10-CM

## 2023-10-17 DIAGNOSIS — I10 BENIGN ESSENTIAL HYPERTENSION: ICD-10-CM

## 2023-10-17 DIAGNOSIS — E10.59 TYPE 1 DIABETES MELLITUS WITH OTHER CIRCULATORY COMPLICATION (H): ICD-10-CM

## 2023-10-17 DIAGNOSIS — E44.0 MODERATE PROTEIN-CALORIE MALNUTRITION (H): ICD-10-CM

## 2023-10-17 DIAGNOSIS — R33.9 URINARY RETENTION: ICD-10-CM

## 2023-10-17 LAB
ALBUMIN SERPL BCG-MCNC: 2.8 G/DL (ref 3.5–5.2)
ANION GAP SERPL CALCULATED.3IONS-SCNC: 6 MMOL/L (ref 7–15)
BUN SERPL-MCNC: 33.3 MG/DL (ref 6–20)
CALCIUM SERPL-MCNC: 8.1 MG/DL (ref 8.6–10)
CHLORIDE SERPL-SCNC: 108 MMOL/L (ref 98–107)
CREAT SERPL-MCNC: 2.33 MG/DL (ref 0.67–1.17)
DEPRECATED HCO3 PLAS-SCNC: 25 MMOL/L (ref 22–29)
EGFRCR SERPLBLD CKD-EPI 2021: 32 ML/MIN/1.73M2
GLUCOSE SERPL-MCNC: 205 MG/DL (ref 70–99)
POTASSIUM SERPL-SCNC: 5 MMOL/L (ref 3.4–5.3)
PREALB SERPL-MCNC: 18.7 MG/DL (ref 20–40)
SODIUM SERPL-SCNC: 139 MMOL/L (ref 135–145)

## 2023-10-17 PROCEDURE — 90471 IMMUNIZATION ADMIN: CPT | Performed by: INTERNAL MEDICINE

## 2023-10-17 PROCEDURE — 80048 BASIC METABOLIC PNL TOTAL CA: CPT | Mod: ZL | Performed by: INTERNAL MEDICINE

## 2023-10-17 PROCEDURE — 84134 ASSAY OF PREALBUMIN: CPT | Mod: ZL | Performed by: INTERNAL MEDICINE

## 2023-10-17 PROCEDURE — 90682 RIV4 VACC RECOMBINANT DNA IM: CPT | Performed by: INTERNAL MEDICINE

## 2023-10-17 PROCEDURE — 90480 ADMN SARSCOV2 VAC 1/ONLY CMP: CPT | Performed by: INTERNAL MEDICINE

## 2023-10-17 PROCEDURE — 99214 OFFICE O/P EST MOD 30 MIN: CPT | Mod: 25 | Performed by: INTERNAL MEDICINE

## 2023-10-17 PROCEDURE — 36415 COLL VENOUS BLD VENIPUNCTURE: CPT | Mod: ZL | Performed by: INTERNAL MEDICINE

## 2023-10-17 PROCEDURE — 82040 ASSAY OF SERUM ALBUMIN: CPT | Mod: ZL | Performed by: INTERNAL MEDICINE

## 2023-10-17 PROCEDURE — 91320 SARSCV2 VAC 30MCG TRS-SUC IM: CPT | Performed by: INTERNAL MEDICINE

## 2023-10-17 RX ORDER — FUROSEMIDE 20 MG
40 TABLET ORAL 2 TIMES DAILY
Qty: 360 TABLET | Refills: 3 | Status: SHIPPED | OUTPATIENT
Start: 2023-10-17 | End: 2023-11-10

## 2023-10-17 ASSESSMENT — PAIN SCALES - GENERAL: PAINLEVEL: NO PAIN (0)

## 2023-10-17 NOTE — PATIENT INSTRUCTIONS
-- Low salt diet, under 2000 mg/day   -- Fluid restrict, under 2000 mL/day aka 8.5 cups/day   -- Eat healthy protein   -- Learn about DASH Diet for dietary ways to reduce blood pressure  Google: [facility] DASH diet  [facility] site: https://www.nhlbi.nih.gov/health-topics/dash-eating-plan  PDF from [facility]: https://www.nhlbi.nih.gov/files/docs/public/heart/new_dash.pdf   -- Elevate feet above your hips for 20-30 minutes, 4 times per day   -- Compression stockings from morning to night   -- Work on 5-10% weight loss   -- Schedule echocardiogram     -- Kidney panel today   -- Increase furosemide   -- Daily weight   -- Daily BP, goal trend down   -- Increase catheter to 3x/day   -- See Nephrology in 10 days as planned   -- Return sooner if worse    Check your Blood Pressure   -- Sit in a chair, feet flat on the floor for 5 minutes   -- Avoid caffeine, exercise and smoking for 30 minutes before checking   -- Have your arm at the level of your heart   -- Make sure you have the correct size cuff   -- Write them down, bring your log book in to your appointment   -- Goal blood pressure 120/70     -- Learn about DASH Diet for dietary ways to reduce blood pressure  Google: [facility] DASH diet  [facility] site: https://www.nhlbi.nih.gov/health-topics/dash-eating-plan  PDF from [facility]: https://www.nhlbi.nih.gov/files/docs/public/heart/new_dash.pdf    What should I do?   -- Reduce salt intake (box, can, package, restaurant, salt shaker)   -- Reduce caffeine   -- Reduce alcohol   -- Work on 5% weight loss   -- Daily physical exercise (American Heart recommends 30-45 minutes of brisk walking 5 days per week)

## 2023-10-17 NOTE — TELEPHONE ENCOUNTER
Patient is scheduled to see Dr. Pro:   10/17/2023 10:40 AM Tello Pro MD   Encounter closed. Juli Bolden RN on 10/17/2023 at 8:37 AM

## 2023-10-17 NOTE — NURSING NOTE
"Chief Complaint   Patient presents with    Swelling     Legs, hands, and face, weight gain         Initial BP (!) 182/90   Pulse 90   Temp 97.8  F (36.6  C) (Tympanic)   Resp 16   Ht 1.93 m (6' 4\")   Wt 134.9 kg (297 lb 6.4 oz)   SpO2 98%   BMI 36.20 kg/m   Estimated body mass index is 36.2 kg/m  as calculated from the following:    Height as of this encounter: 1.93 m (6' 4\").    Weight as of this encounter: 134.9 kg (297 lb 6.4 oz).  Medication Review: complete    The next two questions are to help us understand your food security.  If you are feeling you need any assistance in this area, we have resources available to support you today.          9/28/2023   SDOH- Food Insecurity   Within the past 12 months, did you worry that your food would run out before you got money to buy more? N   Within the past 12 months, did the food you bought just not last and you didn t have money to get more? N         Health Care Directive:  Patient does not have a Health Care Directive or Living Will: Discussed advance care planning with patient; however, patient declined at this time.    Norma J. Gosselin, LPN      "

## 2023-10-17 NOTE — PROGRESS NOTES
Assessment & Plan       ICD-10-CM    1. Acute on chronic heart failure with preserved ejection fraction (H)  I50.33 Echocardiogram Complete     Basic metabolic panel     Basic metabolic panel      2. Chronic kidney disease, stage IV (severe) (H)  N18.4 Basic metabolic panel     Basic metabolic panel      3. Type 1 diabetes mellitus with autonomic dysfunction (H)  E10.43       4. Venous stasis of both lower extremities  I87.8       5. Moderate protein-calorie malnutrition (H24)  E44.0 Prealbumin     Albumin     Prealbumin     Albumin      6. Type 1 diabetes mellitus with other circulatory complication (H)  E10.59 furosemide (LASIX) 20 MG tablet      7. Benign essential hypertension  I10 furosemide (LASIX) 20 MG tablet      8. Non morbid obesity due to excess calories  E66.09 furosemide (LASIX) 20 MG tablet      9. Urinary retention  R33.9       10. Encounter for urinary catheter  Z46.6         He is continuing to experience a delicate fluid balance between chronic kidney disease stage IV as well as heart failure with preserved ejection fraction.  Complicating matters is chronic urinary retention.  He may not be catheterizing frequently enough.  We had a lengthy discussion today including pathophysiology, expected clinical course, possible complications.  Extensive lifestyle modifications were recommended.  Warning signs were discussed and prompt repeat evaluation recommended if symptoms persist or worsen.      Patient Instructions    -- Low salt diet, under 2000 mg/day   -- Fluid restrict, under 2000 mL/day aka 8.5 cups/day   -- Eat healthy protein   -- Learn about DASH Diet for dietary ways to reduce blood pressure  Google: NIH DASH diet  [facility]H site: https://www.nhlbi.nih.gov/health-topics/dash-eating-plan  PDF from [facility]: https://www.nhlbi.nih.gov/files/docs/public/heart/new_dash.pdf   -- Elevate feet above your hips for 20-30 minutes, 4 times per day   -- Compression stockings from morning to night   -- Work on 5-10%  weight loss   -- Schedule echocardiogram     -- Kidney panel today   -- Increase furosemide   -- Daily weight   -- Daily BP, goal trend down   -- Increase catheter to 3x/day   -- See Nephrology in 10 days as planned   -- Return sooner if worse    Check your Blood Pressure   -- Sit in a chair, feet flat on the floor for 5 minutes   -- Avoid caffeine, exercise and smoking for 30 minutes before checking   -- Have your arm at the level of your heart   -- Make sure you have the correct size cuff   -- Write them down, bring your log book in to your appointment   -- Goal blood pressure 120/70     -- Learn about DASH Diet for dietary ways to reduce blood pressure  Google: [facility] DASH diet  [facility]H site: https://www.nhlbi.nih.gov/health-topics/dash-eating-plan  PDF from [facility]: https://www.nhlbi.nih.gov/files/docs/public/heart/new_dash.pdf    What should I do?   -- Reduce salt intake (box, can, package, restaurant, salt shaker)   -- Reduce caffeine   -- Reduce alcohol   -- Work on 5% weight loss   -- Daily physical exercise (American Heart recommends 30-45 minutes of brisk walking 5 days per week)          Return in about 4 weeks (around 11/14/2023), or if symptoms worsen or fail to improve, for kidney, heart, diabetes.    Signed, Tello Pro MD, FAAP, FACP  Internal Medicine & Pediatrics    Subjective   Clemente Graham is a 58 year old male who presents for retaining fluid.  He has been retaining fluid over the course of the last month or more.  He is retaining some fluid and had been seen in clinic on September 28, 2023.  His kidney function was slightly worse and the decision was made to reduce his Lasix dosing.  Since then he has noticed significant and continued increased water retention.  He describes a poor diet.  He only eats once a day.  He is not eating much protein.  He is not eating many vegetables.  He has not yet been able to attain his compression stockings.  He tries to limit salt intake.    Objective   Vitals: BP (!)  "182/90   Pulse 90   Temp 97.8  F (36.6  C) (Tympanic)   Resp 16   Ht 1.93 m (6' 4\")   Wt 134.9 kg (297 lb 6.4 oz)   SpO2 98%   BMI 36.20 kg/m      Cardiovascular:regular  Pulmonary: clear  MSK: 3+ pitting edema to the knees    Review and Analysis of Data   I personally reviewed the following:  External notes: No  Results: Yes laboratory data reviewed including kidney and diabetes  Use of an independent historian: Yes spoke with his wife  Independent review of a test performed by another physician: No  Discussion of management with another physician: No  Moderate risk of morbidity from additional diagnostic testing and/or treatment.    "

## 2023-10-24 ENCOUNTER — HOSPITAL ENCOUNTER (OUTPATIENT)
Dept: CARDIOLOGY | Facility: OTHER | Age: 58
Discharge: HOME | End: 2023-10-24
Attending: INTERNAL MEDICINE | Admitting: INTERNAL MEDICINE
Payer: COMMERCIAL

## 2023-10-24 DIAGNOSIS — I50.33 ACUTE ON CHRONIC HEART FAILURE WITH PRESERVED EJECTION FRACTION (H): ICD-10-CM

## 2023-10-24 LAB — LVEF ECHO: NORMAL

## 2023-10-24 PROCEDURE — 93306 TTE W/DOPPLER COMPLETE: CPT | Mod: 26 | Performed by: INTERNAL MEDICINE

## 2023-10-24 PROCEDURE — 93306 TTE W/DOPPLER COMPLETE: CPT

## 2023-10-30 ENCOUNTER — TRANSFERRED RECORDS (OUTPATIENT)
Dept: HEALTH INFORMATION MANAGEMENT | Facility: CLINIC | Age: 58
End: 2023-10-30

## 2023-11-10 ENCOUNTER — OFFICE VISIT (OUTPATIENT)
Dept: PEDIATRICS | Facility: OTHER | Age: 58
End: 2023-11-10
Attending: INTERNAL MEDICINE
Payer: COMMERCIAL

## 2023-11-10 VITALS
BODY MASS INDEX: 33.21 KG/M2 | WEIGHT: 272.7 LBS | SYSTOLIC BLOOD PRESSURE: 160 MMHG | RESPIRATION RATE: 16 BRPM | DIASTOLIC BLOOD PRESSURE: 70 MMHG | OXYGEN SATURATION: 98 % | HEIGHT: 76 IN | HEART RATE: 81 BPM | TEMPERATURE: 97.7 F

## 2023-11-10 DIAGNOSIS — I45.10 RBBB (RIGHT BUNDLE BRANCH BLOCK): ICD-10-CM

## 2023-11-10 DIAGNOSIS — N18.4 CHRONIC KIDNEY DISEASE, STAGE IV (SEVERE) (H): ICD-10-CM

## 2023-11-10 DIAGNOSIS — I10 BENIGN ESSENTIAL HYPERTENSION: ICD-10-CM

## 2023-11-10 DIAGNOSIS — I50.32 CHRONIC HEART FAILURE WITH PRESERVED EJECTION FRACTION (H): ICD-10-CM

## 2023-11-10 DIAGNOSIS — E10.43: Primary | ICD-10-CM

## 2023-11-10 DIAGNOSIS — I51.89 CARDIAC AKINESIA: ICD-10-CM

## 2023-11-10 DIAGNOSIS — D64.9 NORMOCYTIC ANEMIA: ICD-10-CM

## 2023-11-10 LAB
ANION GAP SERPL CALCULATED.3IONS-SCNC: 9 MMOL/L (ref 7–15)
BUN SERPL-MCNC: 49.4 MG/DL (ref 6–20)
CALCIUM SERPL-MCNC: 8.9 MG/DL (ref 8.6–10)
CHLORIDE SERPL-SCNC: 102 MMOL/L (ref 98–107)
CREAT SERPL-MCNC: 2.76 MG/DL (ref 0.67–1.17)
DEPRECATED HCO3 PLAS-SCNC: 27 MMOL/L (ref 22–29)
EGFRCR SERPLBLD CKD-EPI 2021: 26 ML/MIN/1.73M2
GLUCOSE SERPL-MCNC: 206 MG/DL (ref 70–99)
HBA1C MFR BLD: 6.9 % (ref 4–6.2)
HGB BLD-MCNC: 9.7 G/DL (ref 13.3–17.7)
IRON BINDING CAPACITY (ROCHE): 185 UG/DL (ref 240–430)
IRON SATN MFR SERPL: 44 % (ref 15–46)
IRON SERPL-MCNC: 81 UG/DL (ref 61–157)
POTASSIUM SERPL-SCNC: 3.9 MMOL/L (ref 3.4–5.3)
SODIUM SERPL-SCNC: 138 MMOL/L (ref 135–145)
VIT B12 SERPL-MCNC: 1478 PG/ML (ref 232–1245)

## 2023-11-10 PROCEDURE — 93000 ELECTROCARDIOGRAM COMPLETE: CPT | Performed by: INTERNAL MEDICINE

## 2023-11-10 PROCEDURE — 36415 COLL VENOUS BLD VENIPUNCTURE: CPT | Mod: ZL | Performed by: INTERNAL MEDICINE

## 2023-11-10 PROCEDURE — 82607 VITAMIN B-12: CPT | Mod: ZL | Performed by: INTERNAL MEDICINE

## 2023-11-10 PROCEDURE — 83550 IRON BINDING TEST: CPT | Mod: ZL | Performed by: INTERNAL MEDICINE

## 2023-11-10 PROCEDURE — 83036 HEMOGLOBIN GLYCOSYLATED A1C: CPT | Mod: ZL | Performed by: INTERNAL MEDICINE

## 2023-11-10 PROCEDURE — 80048 BASIC METABOLIC PNL TOTAL CA: CPT | Mod: ZL | Performed by: INTERNAL MEDICINE

## 2023-11-10 PROCEDURE — 99214 OFFICE O/P EST MOD 30 MIN: CPT | Performed by: INTERNAL MEDICINE

## 2023-11-10 PROCEDURE — 85018 HEMOGLOBIN: CPT | Mod: ZL | Performed by: INTERNAL MEDICINE

## 2023-11-10 RX ORDER — FUROSEMIDE 40 MG
40 TABLET ORAL 2 TIMES DAILY
Qty: 180 TABLET | Refills: 3 | Status: SHIPPED | OUTPATIENT
Start: 2023-11-10 | End: 2024-01-26

## 2023-11-10 RX ORDER — FUROSEMIDE 40 MG
40 TABLET ORAL 2 TIMES DAILY
COMMUNITY
Start: 2023-10-27 | End: 2023-11-10

## 2023-11-10 RX ORDER — METOPROLOL TARTRATE 25 MG/1
25 TABLET, FILM COATED ORAL 2 TIMES DAILY
Qty: 180 TABLET | Refills: 3 | Status: SHIPPED | OUTPATIENT
Start: 2023-11-10

## 2023-11-10 RX ORDER — METOLAZONE 2.5 MG/1
2.5 TABLET ORAL
Qty: 45 TABLET | Refills: 3 | Status: SHIPPED | OUTPATIENT
Start: 2023-11-10 | End: 2023-11-10

## 2023-11-10 RX ORDER — METOLAZONE 2.5 MG/1
2.5 TABLET ORAL
COMMUNITY
Start: 2023-10-27 | End: 2023-11-10

## 2023-11-10 RX ORDER — LISINOPRIL 40 MG/1
40 TABLET ORAL DAILY
Qty: 90 TABLET | Refills: 3 | Status: SHIPPED | OUTPATIENT
Start: 2023-11-10 | End: 2024-02-26

## 2023-11-10 RX ORDER — METOLAZONE 2.5 MG/1
2.5 TABLET ORAL
Qty: 45 TABLET | Refills: 3 | Status: SHIPPED | OUTPATIENT
Start: 2023-11-10 | End: 2024-01-26

## 2023-11-10 RX ORDER — LISINOPRIL 40 MG/1
40 TABLET ORAL DAILY
COMMUNITY
Start: 2023-10-27 | End: 2023-11-10

## 2023-11-10 ASSESSMENT — PAIN SCALES - GENERAL: PAINLEVEL: NO PAIN (0)

## 2023-11-10 NOTE — PROGRESS NOTES
Assessment & Plan   1. Type 1 diabetes mellitus with autonomic dysfunction (H)  At goal, no change.  - HEMOGLOBIN A1C; Future  - Basic metabolic panel; Future  - HEMOGLOBIN A1C  - Basic metabolic panel    2. Chronic kidney disease, stage IV (severe) (H)  Appreciate nephrology  - Hemoglobin; Future  - metoprolol tartrate (LOPRESSOR) 25 MG tablet; Take 1 tablet (25 mg) by mouth 2 times daily  Dispense: 180 tablet; Refill: 3  - Hemoglobin    3. Benign essential hypertension  Pressure not at goal.  Discussed options and deciding on adding metoprolol.  - lisinopril (ZESTRIL) 40 MG tablet; Take 1 tablet (40 mg) by mouth daily  Dispense: 90 tablet; Refill: 3  - metoprolol tartrate (LOPRESSOR) 25 MG tablet; Take 1 tablet (25 mg) by mouth 2 times daily  Dispense: 180 tablet; Refill: 3    4. Chronic heart failure with preserved ejection fraction (H)  At goal, no change.  - furosemide (LASIX) 40 MG tablet; Take 1 tablet (40 mg) by mouth 2 times daily  Dispense: 180 tablet; Refill: 3  - metolazone (ZAROXOLYN) 2.5 MG tablet; Take 1 tablet (2.5 mg) by mouth every 48 hours  Dispense: 45 tablet; Refill: 3  - Basic metabolic panel; Future  - metoprolol tartrate (LOPRESSOR) 25 MG tablet; Take 1 tablet (25 mg) by mouth 2 times daily  Dispense: 180 tablet; Refill: 3  - NM MPI with Lexiscan; Future  - Basic metabolic panel    5. Cardiac akinesia -- inferior segment  6. RBBB (right bundle branch block)  Do not hear a murmur today.  Upon closer inspection his most recent echocardiogram within the last month did show inferior akinesis which does appear to be new compared to a prior echocardiogram from care everywhere.  This may be a sign that he had some small ischemic event within the last year.  EKG reveals a right bundle branch block without comparisons available.  I recommend Lexiscan Cardiolite as a next step.  - NM MPI with Lexiscan; Future    7. Normocytic anemia  - Iron & Iron Binding Capacity; Future  - Vitamin B12; Future  - Iron  "& Iron Binding Capacity  - Vitamin B12      Patient Instructions    -- Continue change made by Nephrology   -- Start metoprolol   -- Goal BP under 140/90   -- Goal HR between 50 and 90    -- Lab today   -- Schedule stress test      Return in about 3 months (around 2/10/2024), or if symptoms worsen or fail to improve, for med management.    Signed, Tello Pro MD, FAAP, FACP  Internal Medicine & Pediatrics    Subjective   Clemente Graham is a 58 year old male who presents for diabetes, kidney disease.  His blood pressure has been running higher.  Its been about the same as it is today.  He recently saw the nephrologist.  His lisinopril is now 40 mg daily and they added oxymetazoline every other day.  His leg swelling has significantly improved.  He overall feels better.  He wants to review his heart report.  The nephrologist heard a murmur.    Objective   Vitals: BP (!) 160/70   Pulse 81   Temp 97.7  F (36.5  C) (Tympanic)   Resp 16   Ht 1.93 m (6' 4\")   Wt 123.7 kg (272 lb 11.2 oz)   SpO2 98%   BMI 33.19 kg/m      General: well appearing  CV: Regular rate and rhythm, no murmur, rub or gallop  Pulm: Clear to auscultation bilaterally, no wheezing, rales or rhonchi  Neuro: Grossly intact  Skin: No rash  Psychiatry: Normal affect and insight.    Review and Analysis of Data   I personally reviewed the following:  External notes: Yes I reviewed the nephrology note  Results: Yes reviewed available echocardiogram reports, laboratory data  Use of an independent historian: No  Independent review of a test performed by another physician: No  Discussion of management with another physician: No  Moderate risk of morbidity from additional diagnostic testing and/or treatment.    "

## 2023-11-10 NOTE — PATIENT INSTRUCTIONS
-- Continue change made by Nephrology   -- Start metoprolol   -- Goal BP under 140/90   -- Goal HR between 50 and 90    -- Lab today   -- Schedule stress test

## 2023-11-10 NOTE — NURSING NOTE
"Chief Complaint   Patient presents with    Diabetes    Kidney Problem     Was seen by kidney specialist in [facility]       Initial BP (!) 160/70   Pulse 81   Temp 97.7  F (36.5  C) (Tympanic)   Resp 16   Ht 1.93 m (6' 4\")   Wt 123.7 kg (272 lb 11.2 oz)   SpO2 98%   BMI 33.19 kg/m   Estimated body mass index is 33.19 kg/m  as calculated from the following:    Height as of this encounter: 1.93 m (6' 4\").    Weight as of this encounter: 123.7 kg (272 lb 11.2 oz).  Medication Review: complete    The next two questions are to help us understand your food security.  If you are feeling you need any assistance in this area, we have resources available to support you today.          9/28/2023   SDOH- Food Insecurity   Within the past 12 months, did you worry that your food would run out before you got money to buy more? N   Within the past 12 months, did the food you bought just not last and you didn t have money to get more? N         Health Care Directive:  Patient does not have a Health Care Directive or Living Will: Discussed advance care planning with patient; however, patient declined at this time.    Norma J. Gosselin, LPN      "

## 2023-11-15 ENCOUNTER — TELEPHONE (OUTPATIENT)
Dept: CARDIOLOGY | Facility: OTHER | Age: 58
End: 2023-11-15
Payer: COMMERCIAL

## 2023-11-15 NOTE — TELEPHONE ENCOUNTER
Patient verified .  Reminder call for stress test with instructions given.  Emphasis on NO caffeine for 12 hours.  Ok to take medications with water.  No food for 3 hours.  Ok to have juice or other light nourishment as needed for any low blood sugars.  Patient verbalized understanding.

## 2023-11-16 ENCOUNTER — HOSPITAL ENCOUNTER (OUTPATIENT)
Dept: NUCLEAR MEDICINE | Facility: OTHER | Age: 58
Discharge: HOME | End: 2023-11-16
Attending: INTERNAL MEDICINE
Payer: COMMERCIAL

## 2023-11-16 DIAGNOSIS — I50.32 CHRONIC HEART FAILURE WITH PRESERVED EJECTION FRACTION (H): ICD-10-CM

## 2023-11-16 DIAGNOSIS — I51.89 CARDIAC AKINESIA: ICD-10-CM

## 2023-11-16 DIAGNOSIS — I45.10 RBBB (RIGHT BUNDLE BRANCH BLOCK): ICD-10-CM

## 2023-11-16 LAB
CV BLOOD PRESSURE: 66 MMHG
CV STRESS MAX HR HE: 75
NUC STRESS EJECTION FRACTION: 68 %
RATE PRESSURE PRODUCT: NORMAL
STRESS ECHO BASELINE DIASTOLIC HE: 82
STRESS ECHO BASELINE HR: 71 BPM
STRESS ECHO BASELINE SYSTOLIC BP: 186
STRESS ECHO CALCULATED PERCENT HR: 46 %
STRESS ECHO LAST STRESS DIASTOLIC BP: 75
STRESS ECHO LAST STRESS SYSTOLIC BP: 150
STRESS ECHO POST ESTIMATED WORKLOAD: 1 METS
STRESS ECHO TARGET HR: 162

## 2023-11-16 PROCEDURE — A9500 TC99M SESTAMIBI: HCPCS | Performed by: INTERNAL MEDICINE

## 2023-11-16 PROCEDURE — 250N000011 HC RX IP 250 OP 636: Mod: JZ | Performed by: INTERNAL MEDICINE

## 2023-11-16 PROCEDURE — 343N000001 HC RX 343: Performed by: INTERNAL MEDICINE

## 2023-11-16 PROCEDURE — 93016 CV STRESS TEST SUPVJ ONLY: CPT | Performed by: INTERNAL MEDICINE

## 2023-11-16 PROCEDURE — 93017 CV STRESS TEST TRACING ONLY: CPT

## 2023-11-16 PROCEDURE — 78452 HT MUSCLE IMAGE SPECT MULT: CPT

## 2023-11-16 PROCEDURE — 93018 CV STRESS TEST I&R ONLY: CPT | Performed by: INTERNAL MEDICINE

## 2023-11-16 RX ORDER — AMINOPHYLLINE 25 MG/ML
50-200 INJECTION, SOLUTION INTRAVENOUS
Status: DISCONTINUED | OUTPATIENT
Start: 2023-11-16 | End: 2023-11-17 | Stop reason: HOSPADM

## 2023-11-16 RX ORDER — REGADENOSON 0.08 MG/ML
0.4 INJECTION, SOLUTION INTRAVENOUS ONCE
Status: COMPLETED | OUTPATIENT
Start: 2023-11-16 | End: 2023-11-16

## 2023-11-16 RX ADMIN — REGADENOSON 0.4 MG: 0.08 INJECTION, SOLUTION INTRAVENOUS at 09:44

## 2023-11-16 RX ADMIN — KIT FOR THE PREPARATION OF TECHNETIUM TC99M SESTAMIBI 41 MILLICURIE: 1 INJECTION, POWDER, LYOPHILIZED, FOR SOLUTION PARENTERAL at 09:45

## 2023-11-16 RX ADMIN — KIT FOR THE PREPARATION OF TECHNETIUM TC99M SESTAMIBI 9.74 MILLICURIE: 1 INJECTION, POWDER, LYOPHILIZED, FOR SOLUTION PARENTERAL at 08:10

## 2023-11-16 NOTE — PROGRESS NOTES
0800 The patient arrived for a Lexiscan Cardiolite stress test.  The procedure, risks, and benefits were discussed with the patient,and the consent was signed. Patient checked BS at home this am and was 218.  A saline lock was started,and the Cardiolite was injected by Nuclear Medicine.  The patient was taken to the waiting area, to await resting images at 0820.  0930 The patient returned from Nuclear Medicine and was prepped for the stress test.   Dr. Pro arrived, and the patient was administered the Lexiscan per procedure.  The patient tolerated the procedure.  He was given a snack and taken to Nuclear Medicine in stable condition for stress images.  The saline lock will be removed by Nuclear Medicine for proper disposal.  The patient was instructed that the ordering MD will call with results in one to two days.  Please see the chart for the complete test results.

## 2023-11-20 ENCOUNTER — TRANSFERRED RECORDS (OUTPATIENT)
Dept: HEALTH INFORMATION MANAGEMENT | Facility: CLINIC | Age: 58
End: 2023-11-20

## 2024-01-16 DIAGNOSIS — N18.31 STAGE 3A CHRONIC KIDNEY DISEASE (H): ICD-10-CM

## 2024-01-18 RX ORDER — ERGOCALCIFEROL 1.25 MG/1
CAPSULE, LIQUID FILLED ORAL
Qty: 12 CAPSULE | Refills: 2 | Status: SHIPPED | OUTPATIENT
Start: 2024-01-18 | End: 2024-01-29

## 2024-01-18 NOTE — TELEPHONE ENCOUNTER
vitamin D2 (ERGOCALCIFEROL) 36206 units (1250 mcg) capsule     Sig: Take 1 capsule (50,000) by mouth once a week         Last Written Prescription Date:  5/9/23  Last Fill Quantity: 12,   # refills: 2  Last Office Visit: 11/10/23  Future Office visit:    Next 5 appointments (look out 90 days)      Jan 26, 2024  8:00 AM  SHORT with Tello Pro MD  [facility] ([facility] ) [address]  [place] [address]  [phone]             Routing refill request to provider for review/approval because:  Drug not on the [facility], [facility] or [facility] refill protocol or controlled substance Kailee Zamudio RN on 1/18/2024 at 10:00 AM

## 2024-01-26 ENCOUNTER — OFFICE VISIT (OUTPATIENT)
Dept: PEDIATRICS | Facility: OTHER | Age: 59
End: 2024-01-26
Attending: INTERNAL MEDICINE
Payer: COMMERCIAL

## 2024-01-26 VITALS
WEIGHT: 286.3 LBS | BODY MASS INDEX: 34.86 KG/M2 | RESPIRATION RATE: 16 BRPM | DIASTOLIC BLOOD PRESSURE: 88 MMHG | HEART RATE: 74 BPM | HEIGHT: 76 IN | TEMPERATURE: 97.9 F | OXYGEN SATURATION: 96 % | SYSTOLIC BLOOD PRESSURE: 134 MMHG

## 2024-01-26 DIAGNOSIS — I50.33 ACUTE ON CHRONIC HEART FAILURE WITH PRESERVED EJECTION FRACTION (H): ICD-10-CM

## 2024-01-26 DIAGNOSIS — Z12.5 SCREENING FOR PROSTATE CANCER: ICD-10-CM

## 2024-01-26 DIAGNOSIS — E10.65 UNCONTROLLED TYPE 1 DIABETES MELLITUS WITH HYPERGLYCEMIA (H): ICD-10-CM

## 2024-01-26 DIAGNOSIS — E55.9 VITAMIN D DEFICIENCY: ICD-10-CM

## 2024-01-26 DIAGNOSIS — E10.59 TYPE 1 DIABETES MELLITUS WITH OTHER CIRCULATORY COMPLICATION (H): Chronic | ICD-10-CM

## 2024-01-26 DIAGNOSIS — E10.22 TYPE 1 DIABETES MELLITUS WITH STAGE 4 CHRONIC KIDNEY DISEASE (H): ICD-10-CM

## 2024-01-26 DIAGNOSIS — E78.2 MIXED HYPERLIPIDEMIA: Chronic | ICD-10-CM

## 2024-01-26 DIAGNOSIS — E11.42 DIABETIC PERIPHERAL NEUROPATHY (H): ICD-10-CM

## 2024-01-26 DIAGNOSIS — N18.31 STAGE 3A CHRONIC KIDNEY DISEASE (H): ICD-10-CM

## 2024-01-26 DIAGNOSIS — Z12.11 COLON CANCER SCREENING: ICD-10-CM

## 2024-01-26 DIAGNOSIS — E61.1 IRON DEFICIENCY: ICD-10-CM

## 2024-01-26 DIAGNOSIS — R33.9 URINARY RETENTION: ICD-10-CM

## 2024-01-26 DIAGNOSIS — N18.4 TYPE 1 DIABETES MELLITUS WITH STAGE 4 CHRONIC KIDNEY DISEASE (H): ICD-10-CM

## 2024-01-26 DIAGNOSIS — I50.32 CHRONIC HEART FAILURE WITH PRESERVED EJECTION FRACTION (H): ICD-10-CM

## 2024-01-26 DIAGNOSIS — Z00.00 ANNUAL PHYSICAL EXAM: Primary | ICD-10-CM

## 2024-01-26 DIAGNOSIS — D63.8 ANEMIA OF CHRONIC DISEASE: ICD-10-CM

## 2024-01-26 DIAGNOSIS — Z96.41 INSULIN PUMP IN PLACE: Chronic | ICD-10-CM

## 2024-01-26 DIAGNOSIS — I10 BENIGN ESSENTIAL HYPERTENSION: Chronic | ICD-10-CM

## 2024-01-26 DIAGNOSIS — N40.0 BENIGN PROSTATIC HYPERPLASIA, UNSPECIFIED WHETHER LOWER URINARY TRACT SYMPTOMS PRESENT: ICD-10-CM

## 2024-01-26 DIAGNOSIS — N18.4 CHRONIC KIDNEY DISEASE, STAGE IV (SEVERE) (H): ICD-10-CM

## 2024-01-26 LAB
ANION GAP SERPL CALCULATED.3IONS-SCNC: 8 MMOL/L (ref 7–15)
BUN SERPL-MCNC: 61.6 MG/DL (ref 6–20)
CALCIUM SERPL-MCNC: 9.1 MG/DL (ref 8.6–10)
CHLORIDE SERPL-SCNC: 104 MMOL/L (ref 98–107)
CHOLEST SERPL-MCNC: 165 MG/DL
CREAT SERPL-MCNC: 3.6 MG/DL (ref 0.67–1.17)
DEPRECATED HCO3 PLAS-SCNC: 27 MMOL/L (ref 22–29)
EGFRCR SERPLBLD CKD-EPI 2021: 19 ML/MIN/1.73M2
ERYTHROCYTE [DISTWIDTH] IN BLOOD BY AUTOMATED COUNT: 12.7 % (ref 10–15)
FASTING STATUS PATIENT QL REPORTED: ABNORMAL
GLUCOSE SERPL-MCNC: 105 MG/DL (ref 70–99)
HBA1C MFR BLD: 7 % (ref 4–6.2)
HCT VFR BLD AUTO: 28 % (ref 40–53)
HDLC SERPL-MCNC: 43 MG/DL
HGB BLD-MCNC: 9.4 G/DL (ref 13.3–17.7)
LDLC SERPL CALC-MCNC: 108 MG/DL
MCH RBC QN AUTO: 30.1 PG (ref 26.5–33)
MCHC RBC AUTO-ENTMCNC: 33.6 G/DL (ref 31.5–36.5)
MCV RBC AUTO: 90 FL (ref 78–100)
NONHDLC SERPL-MCNC: 122 MG/DL
PLATELET # BLD AUTO: 330 10E3/UL (ref 150–450)
POTASSIUM SERPL-SCNC: 4.4 MMOL/L (ref 3.4–5.3)
PSA SERPL DL<=0.01 NG/ML-MCNC: 2.37 NG/ML (ref 0–3.5)
RBC # BLD AUTO: 3.12 10E6/UL (ref 4.4–5.9)
SODIUM SERPL-SCNC: 139 MMOL/L (ref 135–145)
TRIGL SERPL-MCNC: 70 MG/DL
VIT D+METAB SERPL-MCNC: 33 NG/ML (ref 20–50)
WBC # BLD AUTO: 6.6 10E3/UL (ref 4–11)

## 2024-01-26 PROCEDURE — 80048 BASIC METABOLIC PNL TOTAL CA: CPT | Mod: ZL | Performed by: INTERNAL MEDICINE

## 2024-01-26 PROCEDURE — 83036 HEMOGLOBIN GLYCOSYLATED A1C: CPT | Mod: ZL | Performed by: INTERNAL MEDICINE

## 2024-01-26 PROCEDURE — 99396 PREV VISIT EST AGE 40-64: CPT | Mod: 25 | Performed by: INTERNAL MEDICINE

## 2024-01-26 PROCEDURE — 82306 VITAMIN D 25 HYDROXY: CPT | Mod: ZL | Performed by: INTERNAL MEDICINE

## 2024-01-26 PROCEDURE — 80061 LIPID PANEL: CPT | Mod: ZL | Performed by: INTERNAL MEDICINE

## 2024-01-26 PROCEDURE — 36415 COLL VENOUS BLD VENIPUNCTURE: CPT | Mod: ZL | Performed by: INTERNAL MEDICINE

## 2024-01-26 PROCEDURE — 90471 IMMUNIZATION ADMIN: CPT | Performed by: INTERNAL MEDICINE

## 2024-01-26 PROCEDURE — 90677 PCV20 VACCINE IM: CPT | Performed by: INTERNAL MEDICINE

## 2024-01-26 PROCEDURE — G0103 PSA SCREENING: HCPCS | Mod: ZL | Performed by: INTERNAL MEDICINE

## 2024-01-26 PROCEDURE — 99214 OFFICE O/P EST MOD 30 MIN: CPT | Mod: 25 | Performed by: INTERNAL MEDICINE

## 2024-01-26 PROCEDURE — 85027 COMPLETE CBC AUTOMATED: CPT | Mod: ZL | Performed by: INTERNAL MEDICINE

## 2024-01-26 RX ORDER — FUROSEMIDE 40 MG
80 TABLET ORAL 2 TIMES DAILY
Qty: 360 TABLET | Refills: 3 | Status: SHIPPED | OUTPATIENT
Start: 2024-01-26 | End: 2024-02-02

## 2024-01-26 RX ORDER — METOLAZONE 2.5 MG/1
2.5 TABLET ORAL EVERY OTHER DAY
Qty: 45 TABLET | Refills: 3 | Status: SHIPPED | OUTPATIENT
Start: 2024-01-26

## 2024-01-26 RX ORDER — TAMSULOSIN HYDROCHLORIDE 0.4 MG/1
0.4 CAPSULE ORAL DAILY
Qty: 90 CAPSULE | Refills: 4 | Status: SHIPPED | OUTPATIENT
Start: 2024-01-26

## 2024-01-26 RX ORDER — ATORVASTATIN CALCIUM 40 MG/1
40 TABLET, FILM COATED ORAL DAILY
Qty: 90 TABLET | Refills: 4 | Status: SHIPPED | OUTPATIENT
Start: 2024-01-26

## 2024-01-26 ASSESSMENT — PAIN SCALES - GENERAL: PAINLEVEL: NO PAIN (0)

## 2024-01-26 NOTE — NURSING NOTE
"Chief Complaint   Patient presents with    Physical    Diabetes    Recheck Medication       Initial /88   Pulse 74   Temp 97.9  F (36.6  C) (Tympanic)   Resp 16   Ht 1.93 m (6' 4\")   Wt 129.9 kg (286 lb 4.8 oz)   SpO2 96%   BMI 34.85 kg/m   Estimated body mass index is 34.85 kg/m  as calculated from the following:    Height as of this encounter: 1.93 m (6' 4\").    Weight as of this encounter: 129.9 kg (286 lb 4.8 oz).  Medication Review: complete    The next two questions are to help us understand your food security.  If you are feeling you need any assistance in this area, we have resources available to support you today.          1/26/2024   SDOH- Food Insecurity   Within the past 12 months, did you worry that your food would run out before you got money to buy more? N   Within the past 12 months, did the food you bought just not last and you didn t have money to get more? N         Health Care Directive:  Patient does not have a Health Care Directive or Living Will: Discussed advance care planning with patient; however, patient declined at this time.    Norma J. Gosselin, LPN      " Mirta Euceda, spouse

## 2024-01-26 NOTE — PROGRESS NOTES
Preventive Care Visit  [facility]  Tello Pro MD, Internal Medicine  Jan 26, 2024      SUBJECTIVE:   Juan is a 58 year old, presenting for the following:  Physical, Diabetes, and Recheck Medication  His legs have been swelling a lot again lately.  No major lifestyle changes.  He was taking his metolazone every 72 hours like I recommended however when his swelling had increased he started back to the every other day.  He is not urinating much when he takes his furosemide.  He was supposed to see his nephrologist again this month but a scheduling snafu caused it to be scheduled out another 3 months.        1/26/2024     8:06 AM   Additional Questions   Roomed by SHAAN Paulino   Accompanied by wife- Jeanette     History of Present Illness       Diabetes:   He presents for follow up of diabetes.   He is checking home blood glucose with a continuous glucose monitor.   He checks blood glucose before meals, after meals, before and after meals and at bedtime.  Blood glucose is sometimes over 200 and sometimes under 70. He is aware of hypoglycemia symptoms including shakiness, blurred vision and confusion.    He has no concerns regarding his diabetes at this time.  He is having numbness in feet and burning in feet.               Today's PHQ-2 Score:       1/26/2024     7:53 AM   PHQ-2 ( 1999 [employer])   Q1: Little interest or pleasure in doing things 0   Q2: Feeling down, depressed or hopeless 0   PHQ-2 Score 0   Q1: Little interest or pleasure in doing things Not at all   Q2: Feeling down, depressed or hopeless Not at all   PHQ-2 Score 0                   Have you ever done Advance Care Planning? (For example, a Health Directive, POLST, or a discussion with a medical provider or your loved ones about your wishes): No, advance care planning information given to patient to review.  Patient declined advance care planning discussion at this time.    Social History     Tobacco Use     Smoking status:  "Never     Passive exposure: Never     Smokeless tobacco: Never     Tobacco comments:     no ecig   Substance Use Topics     Alcohol use: Not Currently     Comment: maybe 2-3 drinks a month              No data to display                   No data to display                Last PSA: No results found for: \"PSA\"    Reviewed orders with patient. Reviewed health maintenance and updated orders accordingly - Yes  Lab work is in process  Labs reviewed in EPIC    Reviewed and updated as needed this visit by clinical staff   Tobacco  Allergies  Meds   Med Hx  Surg Hx  Fam Hx  Soc Hx        Reviewed and updated as needed this visit by Provider                    Review of Systems  Review of Systems:  Skin: Negative  Eyes: Negative  Ears/Nose/Throat: Negative  Respiratory: Negative  Cardiovascular: Negative  Gastrointestinal: Negative  Genitourinary: He does intermittent self catheterizing 3 times a day  Musculoskeletal: See HPI  Neurologic: Negative  Psychiatric: Negative  Hematologic/Lymphatic/Immunologic: Negative  Endocrine: Blood sugars well-controlled.      OBJECTIVE:   /88   Pulse 74   Temp 97.9  F (36.6  C) (Tympanic)   Resp 16   Ht 1.93 m (6' 4\")   Wt 129.9 kg (286 lb 4.8 oz)   SpO2 96%   BMI 34.85 kg/m     Estimated body mass index is 34.85 kg/m  as calculated from the following:    Height as of this encounter: 1.93 m (6' 4\").    Weight as of this encounter: 129.9 kg (286 lb 4.8 oz).  Physical Exam  Gen: Alert, NAD.  Neck: No carotid bruits  CV: RRR no m/r/g  Pulm: CTAB, no w/r/r. No increased work of breathing  Msk: 3-4+ pitting edema up to the upper thighs bilaterally.  Neuro: Grossly intact  Skin: No concerning lesions.  Psychiatric: Normal affect and insight. Does not appear anxious or depressed.      Diagnostic Test Results:  Labs reviewed in Epic    ASSESSMENT/PLAN:   1. Annual physical exam  Immunizations recommended per protocol.  - Albumin Random Urine Quantitative with Creat Ratio; " Future    2. Mixed hyperlipidemia  At goal, no change.  Due for repeat lipid  - atorvastatin (LIPITOR) 40 MG tablet; Take 1 tablet (40 mg) by mouth daily  Dispense: 90 tablet; Refill: 4  - Lipid Profile; Future  - Lipid Profile    3. Uncontrolled type 1 diabetes mellitus with hyperglycemia (H)  Most recent diabetes has been well-controlled.  Previously long-term uncontrolled.  No medication changes today.  Due for follow-up lab work.  - blood glucose (NO BRAND SPECIFIED) lancets standard; Dispense item covered by insurance. Test blood sugar 6 times daily.  Dispense: 100 each; Refill: 11  - blood glucose (NO BRAND SPECIFIED) test strip; Dispense item covered by insurance. Test blood sugar 6 times daily.  Dispense: 100 strip; Refill: 11  - CBC with platelets; Future  - Basic metabolic panel; Future  - Hemoglobin A1c; Future  - Albumin Random Urine Quantitative with Creat Ratio; Future  - Basic Metabolic Panel; Future  - Basic Metabolic Panel; Standing  - Hemoglobin A1c  - Basic metabolic panel  - CBC with platelets    4. Benign prostatic hyperplasia, unspecified whether lower urinary tract symptoms present  At goal, no change.  - tamsulosin (FLOMAX) 0.4 MG capsule; Take 1 capsule (0.4 mg) by mouth daily  Dispense: 90 capsule; Refill: 4    5. Acute on chronic heart failure with preserved ejection fraction (H)  He is having a significantly difficult time balancing his fluid balance.  We discussed at length that balance between heart failure with preserved ejection fraction and chronic kidney disease stage IV.  He is being followed by nephrology.  I think it would benefit him to see Dr. Page of heart failure clinic.  I think it would be worthwhile considering the placement of a CardioMEMS device to facilitate closer fluid balance.    After the visit today his kidney function returned worse.  I believe he may have a component of a cardiorenal syndrome.  We decided on diuretics during the visit should this pattern be  present.  He was not making much urine with regards to his dose of furosemide.  Hopefully the higher dose of furosemide will facilitate some forward motion.  Close follow-up recommended for lab draw the first part of next week.  Low threshold for hospitalization.  - Adult Cardiology Eval  Referral; Future    6. Anemia of chronic disease  - CBC with platelets; Future  - CBC with platelets    7. Iron deficiency    8. Chronic heart failure with preserved ejection fraction (H)  - metolazone (ZAROXOLYN) 2.5 MG tablet; Take 1 tablet (2.5 mg) by mouth every other day  Dispense: 45 tablet; Refill: 3  - Basic Metabolic Panel; Standing  - furosemide (LASIX) 40 MG tablet; Take 2 tablets (80 mg) by mouth 2 times daily  Dispense: 360 tablet; Refill: 3    9. Chronic kidney disease, stage IV (severe) (H)  - Basic metabolic panel; Future  - Basic Metabolic Panel; Future  - Basic Metabolic Panel; Standing  - Basic metabolic panel    10. Type 1 diabetes mellitus with other circulatory complication (H)    11. Benign essential hypertension  At goal, no change.    12. Insulin pump in place  13. Type 1 diabetes mellitus with stage 4 chronic kidney disease (H)  At goal, no change.    14. Urinary retention  Intermittent urinary catheterization recommended    15. Diabetic peripheral neuropathy (H)    16. Vitamin D deficiency  - Vitamin D Deficiency; Future  - Vitamin D Deficiency    17. Screening for prostate cancer  - Prostate Specific Antigen Screen; Future  - Prostate Specific Antigen Screen    18. Colon cancer screening  - Colonoscopy Screening  Referral; Future        Patient Instructions    -- If kidney function allows, would increase furosemide from 40 to 60 mg, and continue metolazone at every other day   -- Daily weight likely around 165 lb   -- Consult to Dr. Ravindra Steele (Cardiology Heart failure), discuss CardioMEMS   -- Anticipate lab only every 2 weeks until steady state on kidney function and dry weight      -- If Vitamin D okay, would reduce to 5000 I U daily     -- Low salt diet, under 2000 mg/day   -- Fluid restrict, under 2000 mL/day aka 8.5 cups/day   -- Eat healthy protein   -- Learn about DASH Diet for dietary ways to reduce blood pressure  Google: [facility] DASH diet  [facility] site: https://www.nhlbi.nih.gov/health-topics/dash-eating-plan  PDF from [facility]: https://www.nhlbi.nih.gov/files/docs/public/heart/new_dash.pdf   -- Elevate feet above your hips for 20-30 minutes, 4 times per day   -- Compression stockings from morning to night   -- Work on 5-10% weight loss    Signed, Tello Pro MD, FAAP, FACP  Internal Medicine & Pediatrics        Counseling  Reviewed preventive health counseling, as reflected in patient instructions        He reports that he has never smoked. He has never been exposed to tobacco smoke. He has never used smokeless tobacco.            Signed Electronically by: Tello Pro MD

## 2024-01-26 NOTE — PATIENT INSTRUCTIONS
-- If kidney function allows, would increase furosemide from 40 to 60 mg, and continue metolazone at every other day   -- Daily weight likely around 165 lb   -- Consult to Dr. Ravindra Steele (Cardiology Heart failure), discuss CardioMEMS   -- Anticipate lab only every 2 weeks until steady state on kidney function and dry weight     -- If Vitamin D okay, would reduce to 5000 I U daily     -- Low salt diet, under 2000 mg/day   -- Fluid restrict, under 2000 mL/day aka 8.5 cups/day   -- Eat healthy protein   -- Learn about DASH Diet for dietary ways to reduce blood pressure  Google: NIH DASH diet  NIH site: https://www.nhlbi.nih.gov/health-topics/dash-eating-plan  PDF from [facility]: https://www.nhlbi.nih.gov/files/docs/public/heart/new_dash.pdf   -- Elevate feet above your hips for 20-30 minutes, 4 times per day   -- Compression stockings from morning to night   -- Work on 5-10% weight loss

## 2024-01-26 NOTE — LETTER
My Heart Failure Action Plan  Name: Clemente Graham   YOB: 1965  Date: 1/26/2024   My doctor:   Tello Pro   [facility]   [address]  [place] [address] [phone] My Diagnosis: HF-pEF (EF > 40%)  My Ejection Fraction:   Lab Results   Component Value Date    LVEF 55-60% 10/24/2023     Over 50%  My Exercise Goal: Start exercise slowly - to begin, do a few minutes of exercise, several times a day. Increase your time and speed [id number] to build tolerance, with a goal of 30 minutes of exercise daily. Steady, slow, and consistent exercise is both safe and healthy. Stop and rest when you feel tired or become short of breath. Do not push yourself on days when you don t feel well.       My Weight Plan:   Wt Readings from Last 2 Encounters:   01/26/24 129.9 kg (286 lb 4.8 oz)   11/10/23 123.7 kg (272 lb 11.2 oz)     Weigh yourself daily using the same scale. If you gain more than 2 pounds in 24 hours or 5 pounds in 7 days. call the clinic    My Diet Goal: 2,000 mg of sodium and 2000 ml of fluids    Emergency Room Visits:    Our goal is to improve your quality of life and help you avoid a visit to the emergency room or hospital.  If we work together, we can achieve this goal. But, if you feel you need to call 911 or go to the emergency room, please do so.  If you go to the emergency room, please bring your list of medicines and your daily weight chart with you.    Each day ask yourself:  Is my weight up?  Do I have swelling?  Do I have trouble breathing?  How did I sleep?  Other problems?       GREEN ZONE     Weight gained is no more than 2 pounds a day or 5 pounds a in 7 days.  No swelling in feet, ankles, legs or stomach.  No more swelling than usual.  No more trouble breathing than usual.  No change in my sleep.  No other problems. What should I do?  I am doing fine. I will take my medicine, follow my diet, see my doctor, exercise, and watch for  symptoms.           YELLOW ZONE         Weight gain of more than 2 pounds in one day or 5 pounds in 7 days.  New swelling in ankle, leg, knee or thigh.  Bloating in belly, pants feel tighter.  Swelling in hands or face.  Coughing or trouble breathing while walking or talking.  Harder to breathe last night.  Have trouble sleeping, wake up short of breath.  Unusually tired.  Not eating.  Nausea, vomiting, or diarrhea  Pain in my chest or bad  leg cramps.  Feel weak or dizzy. What should I do?  I need to take action and call my doctor or nurse today.                 RED ZONE         Weight gain of 5 pounds overnight.  Chest pain or pressure that does not go away.  Feel less alert.  Wheezing or have trouble breathing when at rest.  Cannot sleep lying down.  Cannot take my medicines.  Pass out or faint. What should I do?  I need to call my doctor or nurse now!  Call 911 if I have chest pain or cannot breathe.

## 2024-01-29 RX ORDER — ERGOCALCIFEROL 1.25 MG/1
CAPSULE, LIQUID FILLED ORAL
Qty: 12 CAPSULE | Refills: 4 | Status: SHIPPED | OUTPATIENT
Start: 2024-01-29 | End: 2024-08-30

## 2024-01-30 ENCOUNTER — TELEPHONE (OUTPATIENT)
Dept: PEDIATRICS | Facility: OTHER | Age: 59
End: 2024-01-30
Payer: COMMERCIAL

## 2024-01-30 DIAGNOSIS — N17.9 ACUTE RENAL FAILURE SUPERIMPOSED ON STAGE 4 CHRONIC KIDNEY DISEASE, UNSPECIFIED ACUTE RENAL FAILURE TYPE (H): Primary | ICD-10-CM

## 2024-01-30 DIAGNOSIS — N18.4 ACUTE RENAL FAILURE SUPERIMPOSED ON STAGE 4 CHRONIC KIDNEY DISEASE, UNSPECIFIED ACUTE RENAL FAILURE TYPE (H): Primary | ICD-10-CM

## 2024-01-30 NOTE — TELEPHONE ENCOUNTER
Mr. Graham has a lab appointment scheduled for tomorrow. Is this ok or would you like me to have him change it to today?    Aleshia Slade RN on 1/30/2024 at 8:16 AM

## 2024-01-30 NOTE — TELEPHONE ENCOUNTER
I won't be here tomorrow to respond.  If he can come today it would be great, otherwise one of my partners may have to respond to the result.    Signed, Tello Pro MD, FAAP, FACP  Internal Medicine & Pediatrics

## 2024-01-30 NOTE — TELEPHONE ENCOUNTER
Called and spoke with patient. He stated that he is not able to come in today as he cannot take off more work. He will be here tomorrow morning for his lab draw.     Aleshia Slade RN on 1/30/2024 at 10:29 AM

## 2024-01-30 NOTE — TELEPHONE ENCOUNTER
Please call  Gladys and have him come for a lab only today BMP.    Signed, Tello Pro MD, FAAP, FACP  Internal Medicine & Pediatrics

## 2024-01-31 ENCOUNTER — LAB (OUTPATIENT)
Dept: LAB | Facility: OTHER | Age: 59
End: 2024-01-31
Attending: INTERNAL MEDICINE
Payer: COMMERCIAL

## 2024-01-31 DIAGNOSIS — N17.9 ACUTE RENAL FAILURE SUPERIMPOSED ON STAGE 4 CHRONIC KIDNEY DISEASE, UNSPECIFIED ACUTE RENAL FAILURE TYPE (H): ICD-10-CM

## 2024-01-31 DIAGNOSIS — N18.4 ACUTE RENAL FAILURE SUPERIMPOSED ON STAGE 4 CHRONIC KIDNEY DISEASE, UNSPECIFIED ACUTE RENAL FAILURE TYPE (H): ICD-10-CM

## 2024-01-31 DIAGNOSIS — Z00.00 ANNUAL PHYSICAL EXAM: ICD-10-CM

## 2024-01-31 LAB
ANION GAP SERPL CALCULATED.3IONS-SCNC: 8 MMOL/L (ref 7–15)
BUN SERPL-MCNC: 60.6 MG/DL (ref 8–23)
CALCIUM SERPL-MCNC: 8.7 MG/DL (ref 8.6–10)
CHLORIDE SERPL-SCNC: 103 MMOL/L (ref 98–107)
CREAT SERPL-MCNC: 3.69 MG/DL (ref 0.67–1.17)
DEPRECATED HCO3 PLAS-SCNC: 31 MMOL/L (ref 22–29)
EGFRCR SERPLBLD CKD-EPI 2021: 18 ML/MIN/1.73M2
GLUCOSE SERPL-MCNC: 213 MG/DL (ref 70–99)
HOLD SPECIMEN: NORMAL
POTASSIUM SERPL-SCNC: 4.7 MMOL/L (ref 3.4–5.3)
SODIUM SERPL-SCNC: 142 MMOL/L (ref 135–145)

## 2024-01-31 PROCEDURE — 36415 COLL VENOUS BLD VENIPUNCTURE: CPT | Mod: ZL

## 2024-01-31 PROCEDURE — 80048 BASIC METABOLIC PNL TOTAL CA: CPT | Mod: ZL

## 2024-02-02 ENCOUNTER — OFFICE VISIT (OUTPATIENT)
Dept: PEDIATRICS | Facility: OTHER | Age: 59
End: 2024-02-02
Attending: INTERNAL MEDICINE
Payer: COMMERCIAL

## 2024-02-02 VITALS
HEIGHT: 76 IN | OXYGEN SATURATION: 98 % | SYSTOLIC BLOOD PRESSURE: 112 MMHG | TEMPERATURE: 98.7 F | WEIGHT: 268.8 LBS | DIASTOLIC BLOOD PRESSURE: 60 MMHG | BODY MASS INDEX: 32.73 KG/M2 | HEART RATE: 72 BPM | RESPIRATION RATE: 16 BRPM

## 2024-02-02 DIAGNOSIS — N18.4 CONTROLLED TYPE 1 DIABETES MELLITUS WITH STAGE 4 CHRONIC KIDNEY DISEASE (H): ICD-10-CM

## 2024-02-02 DIAGNOSIS — N18.4 ACUTE RENAL FAILURE SUPERIMPOSED ON STAGE 4 CHRONIC KIDNEY DISEASE, UNSPECIFIED ACUTE RENAL FAILURE TYPE (H): Primary | ICD-10-CM

## 2024-02-02 DIAGNOSIS — E10.22 CONTROLLED TYPE 1 DIABETES MELLITUS WITH STAGE 4 CHRONIC KIDNEY DISEASE (H): ICD-10-CM

## 2024-02-02 DIAGNOSIS — N17.9 ACUTE RENAL FAILURE SUPERIMPOSED ON STAGE 4 CHRONIC KIDNEY DISEASE, UNSPECIFIED ACUTE RENAL FAILURE TYPE (H): Primary | ICD-10-CM

## 2024-02-02 DIAGNOSIS — R33.9 URINARY RETENTION: ICD-10-CM

## 2024-02-02 DIAGNOSIS — E10.21 DIABETIC NEPHROPATHY ASSOCIATED WITH TYPE 1 DIABETES MELLITUS (H): ICD-10-CM

## 2024-02-02 DIAGNOSIS — N31.9 NEUROGENIC BLADDER: ICD-10-CM

## 2024-02-02 DIAGNOSIS — I50.32 CHRONIC HEART FAILURE WITH PRESERVED EJECTION FRACTION (H): ICD-10-CM

## 2024-02-02 LAB
ANION GAP SERPL CALCULATED.3IONS-SCNC: 10 MMOL/L (ref 7–15)
BUN SERPL-MCNC: 63.6 MG/DL (ref 8–23)
CALCIUM SERPL-MCNC: 8.3 MG/DL (ref 8.6–10)
CHLORIDE SERPL-SCNC: 101 MMOL/L (ref 98–107)
CREAT SERPL-MCNC: 3.81 MG/DL (ref 0.67–1.17)
DEPRECATED HCO3 PLAS-SCNC: 30 MMOL/L (ref 22–29)
EGFRCR SERPLBLD CKD-EPI 2021: 17 ML/MIN/1.73M2
GLUCOSE SERPL-MCNC: 146 MG/DL (ref 70–99)
HOLD SPECIMEN: NORMAL
HOLD SPECIMEN: NORMAL
POTASSIUM SERPL-SCNC: 4.1 MMOL/L (ref 3.4–5.3)
SODIUM SERPL-SCNC: 141 MMOL/L (ref 135–145)

## 2024-02-02 PROCEDURE — 99215 OFFICE O/P EST HI 40 MIN: CPT | Performed by: INTERNAL MEDICINE

## 2024-02-02 PROCEDURE — 80048 BASIC METABOLIC PNL TOTAL CA: CPT | Mod: ZL | Performed by: INTERNAL MEDICINE

## 2024-02-02 PROCEDURE — 36415 COLL VENOUS BLD VENIPUNCTURE: CPT | Mod: ZL | Performed by: INTERNAL MEDICINE

## 2024-02-02 RX ORDER — FUROSEMIDE 40 MG
TABLET ORAL
Qty: 360 TABLET | Refills: 3 | Status: SHIPPED | OUTPATIENT
Start: 2024-02-02

## 2024-02-02 ASSESSMENT — PAIN SCALES - GENERAL: PAINLEVEL: NO PAIN (0)

## 2024-02-02 NOTE — PATIENT INSTRUCTIONS
-- Change furosemide: 80 mg daily.  If you notice a > 5 lb weight gain associated with water swelling or short of breath, take 80 mg twice daily for 5 days then back to 80 mg daily   -- Daily weights   -- Will request urgent follow-up with Nephrology

## 2024-02-02 NOTE — PROGRESS NOTES
Assessment & Plan       ICD-10-CM    1. Acute renal failure superimposed on stage 4 chronic kidney disease, unspecified acute renal failure type (H)  N17.9 Basic Metabolic Panel    N18.4 Basic Metabolic Panel     Adult Nephrology  Referral     CANCELED: Basic Metabolic Panel      2. Controlled type 1 diabetes mellitus with stage 4 chronic kidney disease (H)  E10.22     N18.4       3. Diabetic nephropathy associated with type 1 diabetes mellitus (H)  E10.21       4. Chronic heart failure with preserved ejection fraction (H)  I50.32 furosemide (LASIX) 40 MG tablet      5. Neurogenic bladder  N31.9       6. Urinary retention  R33.9         We reviewed the trend of his basic metabolic panels over the course of the last 6 months or so.  There appears to have been a stepwise decline in his kidney function in mid November.  Unfortunately there was a paucity of data between mid November and last week when a subsequent stepwise decline in kidney function is present.  I was hopeful there may have been a component of cardiorenal syndrome and that with a significant reduction in volume overloading we may be able to improve his kidney function.  Unfortunately his kidney function remains at that reduced level and slightly worse despite a significant increase in his overall volume status.  At this point I am concerned that he has overall a stepwise decline in his kidney function.  The reason for this would presumably be secondary to longstanding diabetes mellitus type 1, essential hypertension, etc.  He has not been able to get into see his nephrologist until April.  I will reach out to nephrology and see if they are able to see him sooner, preferably in the next 2 weeks.  I spoke to he and his wife about the possibility of dialysis in the near future.  We discussed both peritoneal and hemodialysis.  Adverse effects of renal failure were discussed including hyperkalemia, volume overloading, uremia.    With regards to  "changes at this time I believe that he is close to his dry weight status.  I recommend continuing the furosemide dose at 80 mg but cutting back to once a day.  He will also continue on the metolazone.  I have asked him to watch his weights and fluid status more closely.      Patient Instructions    -- Change furosemide: 80 mg daily.  If you notice a > 5 lb weight gain associated with water swelling or short of breath, take 80 mg twice daily for 5 days then back to 80 mg daily   -- Daily weights   -- Will request urgent follow-up with Nephrology      Return if symptoms worsen or fail to improve.    Signed, Tello Pro MD, FAAP, FACP  Internal Medicine & Pediatrics    Subjective   Clemente Graham is a 59 year old male who presents for follow-up lab work.  He has had a significant improvement in his lower extremity edema.  His blue jeans were previously tight to his thighs.  He had pitting edema.  This has significantly improved.  He has lost 20 pounds of water weight.  He is breathing a lot better.  He does not have to urinate immediately after taking the dose to take several hours.  He has been using his intermittent straight catheterization 3 times per day.    Objective   Vitals: /60   Pulse 72   Temp 98.7  F (37.1  C) (Tympanic)   Resp 16   Ht 1.93 m (6' 4\")   Wt 121.9 kg (268 lb 12.8 oz)   SpO2 98%   BMI 32.72 kg/m      Musculoskeletal: Wearing the compression stockings on the lower extremities.  No edema on the upper thighs.    Review and Analysis of Data   I personally reviewed the following:  External notes: Yes previous nephrology notes are reviewed  Results: Yes last year worth of basic metabolic panels reviewed including today.  Use of an independent historian: Yes .  Spoke to his wife Jeanette  Independent review of a test performed by another physician: No  Discussion of management with another physician: No  High risk of morbidity from additional diagnostic testing and/or treatment.    "

## 2024-02-02 NOTE — Clinical Note
Please fax a copy of my note today to Dr. Clark Culver, [facility].  Signed, Tello Pro MD, FAAP, FACP Internal Medicine & Pediatrics

## 2024-02-14 ENCOUNTER — HOSPITAL ENCOUNTER (OUTPATIENT)
Facility: OTHER | Age: 59
End: 2024-02-14
Attending: SURGERY | Admitting: SURGERY
Payer: COMMERCIAL

## 2024-02-14 DIAGNOSIS — Z12.11 ENCOUNTER FOR SCREENING COLONOSCOPY: Primary | ICD-10-CM

## 2024-02-14 NOTE — TELEPHONE ENCOUNTER
Screening Questions for the Scheduling of Screening Colonoscopies   (If Colonoscopy is diagnostic, Provider should review the chart before scheduling.)  Are you younger than 45 or older than 80?  NO  Do you take aspirin or fish oil?  ASPIRIN (if yes, tell patient to stop 1 week prior to Colonoscopy)  Do you take warfarin (Coumadin), clopidogrel (Plavix), apixaban (Eliquis), dabigatram (Pradaxa), rivaroxaban (Xarelto) or any blood thinner? NO  Do you take Ozempic? NO  Do you use oxygen or a CPAP at home?  NO  Do you have kidney disease? YES  Are you on dialysis? NO  Have you had a stroke or heart attack in the last year? NO  Have you had a stent in your heart or any blood vessel in the last year? NO  Have you had a transplant of any organ? NO  Have you had a colonoscopy or upper endoscopy (EGD) before? YES         When?  2019  Date of scheduled Colonoscopy. 04/11/2024  Provider AZUL  Pharmacy [facility] - Patient is requesting Gatorade Prep.

## 2024-02-15 RX ORDER — POLYETHYLENE GLYCOL 3350 17 G/17G
POWDER, FOR SOLUTION ORAL
Qty: 510 G | Refills: 0 | Status: SHIPPED | OUTPATIENT
Start: 2024-04-04

## 2024-02-15 RX ORDER — BISACODYL 5 MG/1
TABLET, DELAYED RELEASE ORAL
Qty: 2 TABLET | Refills: 0 | Status: SHIPPED | OUTPATIENT
Start: 2024-04-04

## 2024-02-26 ENCOUNTER — TELEPHONE (OUTPATIENT)
Dept: EDUCATION SERVICES | Facility: OTHER | Age: 59
End: 2024-02-26
Payer: COMMERCIAL

## 2024-02-26 ENCOUNTER — OFFICE VISIT (OUTPATIENT)
Dept: PEDIATRICS | Facility: OTHER | Age: 59
End: 2024-02-26
Attending: INTERNAL MEDICINE
Payer: COMMERCIAL

## 2024-02-26 VITALS
TEMPERATURE: 97.8 F | RESPIRATION RATE: 16 BRPM | HEIGHT: 76 IN | BODY MASS INDEX: 31.54 KG/M2 | DIASTOLIC BLOOD PRESSURE: 56 MMHG | OXYGEN SATURATION: 98 % | HEART RATE: 77 BPM | WEIGHT: 259 LBS | SYSTOLIC BLOOD PRESSURE: 112 MMHG

## 2024-02-26 DIAGNOSIS — N18.4 CONTROLLED TYPE 1 DIABETES MELLITUS WITH STAGE 4 CHRONIC KIDNEY DISEASE (H): ICD-10-CM

## 2024-02-26 DIAGNOSIS — E10.59 TYPE 1 DIABETES MELLITUS WITH OTHER CIRCULATORY COMPLICATION (H): Chronic | ICD-10-CM

## 2024-02-26 DIAGNOSIS — E10.22 CONTROLLED TYPE 1 DIABETES MELLITUS WITH STAGE 4 CHRONIC KIDNEY DISEASE (H): ICD-10-CM

## 2024-02-26 DIAGNOSIS — E10.21 DIABETIC NEPHROPATHY ASSOCIATED WITH TYPE 1 DIABETES MELLITUS (H): ICD-10-CM

## 2024-02-26 DIAGNOSIS — R42 DIZZY SPELLS: ICD-10-CM

## 2024-02-26 DIAGNOSIS — I10 BENIGN ESSENTIAL HYPERTENSION: ICD-10-CM

## 2024-02-26 DIAGNOSIS — E10.22 TYPE 1 DIABETES MELLITUS WITH STAGE 4 CHRONIC KIDNEY DISEASE (H): Primary | ICD-10-CM

## 2024-02-26 DIAGNOSIS — N18.4 TYPE 1 DIABETES MELLITUS WITH STAGE 4 CHRONIC KIDNEY DISEASE (H): Primary | ICD-10-CM

## 2024-02-26 DIAGNOSIS — E11.42 DIABETIC PERIPHERAL NEUROPATHY (H): ICD-10-CM

## 2024-02-26 PROCEDURE — 99214 OFFICE O/P EST MOD 30 MIN: CPT | Performed by: INTERNAL MEDICINE

## 2024-02-26 RX ORDER — LISINOPRIL 20 MG/1
20 TABLET ORAL DAILY
Qty: 90 TABLET | Refills: 3 | Status: SHIPPED | OUTPATIENT
Start: 2024-02-26

## 2024-02-26 ASSESSMENT — PAIN SCALES - GENERAL: PAINLEVEL: NO PAIN (0)

## 2024-02-26 NOTE — TELEPHONE ENCOUNTER
Patient presented to counter trouble with pump and would like a call back. Patient would like a call back at [phone]  Mariah Rodriguez on 2/26/2024 at 3:08 PM

## 2024-02-26 NOTE — PROGRESS NOTES
Assessment & Plan       ICD-10-CM    1. Type 1 diabetes mellitus with stage 4 chronic kidney disease (H)  E10.22 Basic Metabolic Panel    N18.4       2. Benign essential hypertension  I10 lisinopril (ZESTRIL) 20 MG tablet     Basic Metabolic Panel      3. Type 1 diabetes mellitus with other circulatory complication (H)  E10.59       4. Diabetic peripheral neuropathy (H)  E11.42       5. Dizzy spells  R42       6. Diabetic nephropathy associated with type 1 diabetes mellitus (H)  E10.21       7. Controlled type 1 diabetes mellitus with stage 4 chronic kidney disease (H)  E10.22     N18.4         We reviewed his lab work today.  He has had a consistent downward trend of kidney function over the course of the last 9 months.  I appreciate Dr. Culver of nephrology.  We discussed the possibility of a referral to a transplant center and he decided to wait to discuss with Dr. Culver.  I believe his dizzy spells may be due to hypotension.  His blood pressures look excellent today.  His lower extremity edema has normalized.  I recommend reducing his lisinopril and hydralazine as outlined below.  Close home blood pressure monitoring is recommended.  Close follow-up in the office is recommended.    Patient Instructions    -- Reduce lisinopril from 40 to 20 mg   -- Go back to as needed for hydralazine   -- No change to diuretics   -- Goal BP under 140/90 more often than not, with improvement in dizziness   -- Appreciate Dr. Culver   -- Consider referral to Transplant Nephrology at the [facility] or [place]   -- Lab only 1 hour before   -- Dr Pro in 1 month for follow-up BP      Return in about 1 month (around 3/26/2024), or if symptoms worsen or fail to improve, for hypertension, CKD IV.    Signed, Tello Pro MD, FAAP, FACP  Internal Medicine & Pediatrics    Subjective   Clemente Graham is a 59 year old male who presents for: Diabetes, kidney disease.    Objective   Vitals: /56 (BP Location: Right arm, Patient Position:  "Sitting, Cuff Size: Adult Regular)   Pulse 77   Temp 97.8  F (36.6  C) (Tympanic)   Resp 16   Ht 1.93 m (6' 4\")   Wt 117.5 kg (259 lb)   SpO2 98%   BMI 31.53 kg/m        Review and Analysis of Data   I personally reviewed the following:  External notes: Yes I reviewed the last nephrology note from February 15  Results: Yes reviewed diabetic and kidney labs  Use of an independent historian: Yes spoke with his wife  Independent review of a test performed by another physician: No  Discussion of management with another physician: No  Moderate risk of morbidity from additional diagnostic testing and/or treatment.    "

## 2024-02-26 NOTE — PATIENT INSTRUCTIONS
-- Reduce lisinopril from 40 to 20 mg   -- Go back to as needed for hydralazine   -- No change to diuretics   -- Goal BP under 140/90 more often than not, with improvement in dizziness   -- Appreciate Dr. Culver   -- Consider referral to Transplant Nephrology at the [facility] or [place]   -- Lab only 1 hour before   -- Dr Pro in 1 month for follow-up BP

## 2024-02-26 NOTE — NURSING NOTE
"Chief Complaint   Patient presents with    RECHECK       Initial There were no vitals taken for this visit. Estimated body mass index is 32.72 kg/m  as calculated from the following:    Height as of 2/2/24: 1.93 m (6' 4\").    Weight as of 2/2/24: 121.9 kg (268 lb 12.8 oz).  Medication Review: complete    The next two questions are to help us understand your food security.  If you are feeling you need any assistance in this area, we have resources available to support you today.          1/26/2024   SDOH- Food Insecurity   Within the past 12 months, did you worry that your food would run out before you got money to buy more? N   Within the past 12 months, did the food you bought just not last and you didn t have money to get more? N         Health Care Directive:  Patient does not have a Health Care Directive or Living Will: Discussed advance care planning with patient; however, patient declined at this time.    Magalie Liu CNA      "

## 2024-03-03 ENCOUNTER — HOSPITAL ENCOUNTER (EMERGENCY)
Facility: OTHER | Age: 59
Discharge: HOME | End: 2024-03-03
Attending: PHYSICIAN ASSISTANT | Admitting: PHYSICIAN ASSISTANT
Payer: COMMERCIAL

## 2024-03-03 ENCOUNTER — APPOINTMENT (OUTPATIENT)
Dept: CT IMAGING | Facility: OTHER | Age: 59
End: 2024-03-03
Attending: PHYSICIAN ASSISTANT
Payer: COMMERCIAL

## 2024-03-03 VITALS
SYSTOLIC BLOOD PRESSURE: 162 MMHG | RESPIRATION RATE: 18 BRPM | OXYGEN SATURATION: 95 % | HEART RATE: 74 BPM | BODY MASS INDEX: 30.86 KG/M2 | HEIGHT: 76 IN | DIASTOLIC BLOOD PRESSURE: 71 MMHG | TEMPERATURE: 99.3 F | WEIGHT: 253.4 LBS

## 2024-03-03 DIAGNOSIS — E10.9 TYPE 1 DIABETES MELLITUS (H): ICD-10-CM

## 2024-03-03 DIAGNOSIS — K59.00 CONSTIPATION: ICD-10-CM

## 2024-03-03 DIAGNOSIS — M54.50 ACUTE RIGHT-SIDED LOW BACK PAIN WITHOUT SCIATICA: ICD-10-CM

## 2024-03-03 DIAGNOSIS — N18.9 CKD (CHRONIC KIDNEY DISEASE): ICD-10-CM

## 2024-03-03 DIAGNOSIS — N39.0 UTI (URINARY TRACT INFECTION): ICD-10-CM

## 2024-03-03 LAB
ALBUMIN SERPL BCG-MCNC: 3.3 G/DL (ref 3.5–5.2)
ALBUMIN UR-MCNC: 300 MG/DL
ALP SERPL-CCNC: 67 U/L (ref 40–150)
ALT SERPL W P-5'-P-CCNC: 35 U/L (ref 0–70)
ANION GAP SERPL CALCULATED.3IONS-SCNC: 14 MMOL/L (ref 7–15)
APPEARANCE UR: CLEAR
AST SERPL W P-5'-P-CCNC: 39 U/L (ref 0–45)
BACTERIA #/AREA URNS HPF: ABNORMAL /HPF
BASOPHILS # BLD AUTO: 0 10E3/UL (ref 0–0.2)
BASOPHILS NFR BLD AUTO: 0 %
BILIRUB SERPL-MCNC: 0.3 MG/DL
BILIRUB UR QL STRIP: NEGATIVE
BUN SERPL-MCNC: 72.1 MG/DL (ref 8–23)
CALCIUM SERPL-MCNC: 9.5 MG/DL (ref 8.6–10)
CHLORIDE SERPL-SCNC: 97 MMOL/L (ref 98–107)
COLOR UR AUTO: ABNORMAL
CREAT SERPL-MCNC: 3.32 MG/DL (ref 0.67–1.17)
DEPRECATED HCO3 PLAS-SCNC: 22 MMOL/L (ref 22–29)
EGFRCR SERPLBLD CKD-EPI 2021: 21 ML/MIN/1.73M2
EOSINOPHIL # BLD AUTO: 0 10E3/UL (ref 0–0.7)
EOSINOPHIL NFR BLD AUTO: 0 %
ERYTHROCYTE [DISTWIDTH] IN BLOOD BY AUTOMATED COUNT: 12.3 % (ref 10–15)
GLUCOSE SERPL-MCNC: 243 MG/DL (ref 70–99)
GLUCOSE UR STRIP-MCNC: NEGATIVE MG/DL
HCT VFR BLD AUTO: 28.5 % (ref 40–53)
HGB BLD-MCNC: 10 G/DL (ref 13.3–17.7)
HGB UR QL STRIP: ABNORMAL
HYALINE CASTS: 12 /LPF
IMM GRANULOCYTES # BLD: 0 10E3/UL
IMM GRANULOCYTES NFR BLD: 0 %
KETONES UR STRIP-MCNC: NEGATIVE MG/DL
LACTATE SERPL-SCNC: 1.5 MMOL/L (ref 0.7–2)
LACTATE SERPL-SCNC: 2.3 MMOL/L (ref 0.7–2)
LEUKOCYTE ESTERASE UR QL STRIP: ABNORMAL
LIPASE SERPL-CCNC: 8 U/L (ref 13–60)
LYMPHOCYTES # BLD AUTO: 0.7 10E3/UL (ref 0–5.3)
LYMPHOCYTES NFR BLD AUTO: 11 %
MCH RBC QN AUTO: 30.9 PG (ref 26.5–33)
MCHC RBC AUTO-ENTMCNC: 35.1 G/DL (ref 31.5–36.5)
MCV RBC AUTO: 88 FL (ref 78–100)
MONOCYTES # BLD AUTO: 0.8 10E3/UL (ref 0–1.3)
MONOCYTES NFR BLD AUTO: 11 %
MUCOUS THREADS #/AREA URNS LPF: PRESENT /LPF
NEUTROPHILS # BLD AUTO: 5.4 10E3/UL (ref 1.6–8.3)
NEUTROPHILS NFR BLD AUTO: 78 %
NITRATE UR QL: NEGATIVE
NRBC # BLD AUTO: 0 10E3/UL
NRBC BLD AUTO-RTO: 0 /100
PH UR STRIP: 5.5 [PH] (ref 5–9)
PLATELET # BLD AUTO: 248 10E3/UL (ref 150–450)
POTASSIUM SERPL-SCNC: 4.9 MMOL/L (ref 3.4–5.3)
PROCALCITONIN SERPL IA-MCNC: 1 NG/ML
PROT SERPL-MCNC: 6.1 G/DL (ref 6.4–8.3)
RBC # BLD AUTO: 3.24 10E6/UL (ref 4.4–5.9)
RBC URINE: 4 /HPF
SODIUM SERPL-SCNC: 133 MMOL/L (ref 135–145)
SP GR UR STRIP: 1.01 (ref 1–1.03)
UROBILINOGEN UR STRIP-MCNC: NORMAL MG/DL
WBC # BLD AUTO: 6.9 10E3/UL (ref 4–11)
WBC URINE: 12 /HPF

## 2024-03-03 PROCEDURE — 36415 COLL VENOUS BLD VENIPUNCTURE: CPT | Performed by: PHYSICIAN ASSISTANT

## 2024-03-03 PROCEDURE — 87086 URINE CULTURE/COLONY COUNT: CPT | Performed by: PHYSICIAN ASSISTANT

## 2024-03-03 PROCEDURE — 258N000003 HC RX IP 258 OP 636: Performed by: PHYSICIAN ASSISTANT

## 2024-03-03 PROCEDURE — 81001 URINALYSIS AUTO W/SCOPE: CPT | Performed by: PHYSICIAN ASSISTANT

## 2024-03-03 PROCEDURE — 83605 ASSAY OF LACTIC ACID: CPT | Performed by: PHYSICIAN ASSISTANT

## 2024-03-03 PROCEDURE — 84145 PROCALCITONIN (PCT): CPT | Performed by: PHYSICIAN ASSISTANT

## 2024-03-03 PROCEDURE — 87186 SC STD MICRODIL/AGAR DIL: CPT | Performed by: PHYSICIAN ASSISTANT

## 2024-03-03 PROCEDURE — 99285 EMERGENCY DEPT VISIT HI MDM: CPT | Mod: 25 | Performed by: PHYSICIAN ASSISTANT

## 2024-03-03 PROCEDURE — 85025 COMPLETE CBC W/AUTO DIFF WBC: CPT | Performed by: PHYSICIAN ASSISTANT

## 2024-03-03 PROCEDURE — 250N000011 HC RX IP 250 OP 636: Performed by: PHYSICIAN ASSISTANT

## 2024-03-03 PROCEDURE — 80053 COMPREHEN METABOLIC PANEL: CPT | Performed by: PHYSICIAN ASSISTANT

## 2024-03-03 PROCEDURE — 87040 BLOOD CULTURE FOR BACTERIA: CPT | Performed by: PHYSICIAN ASSISTANT

## 2024-03-03 PROCEDURE — 83690 ASSAY OF LIPASE: CPT | Performed by: PHYSICIAN ASSISTANT

## 2024-03-03 PROCEDURE — 96361 HYDRATE IV INFUSION ADD-ON: CPT | Performed by: PHYSICIAN ASSISTANT

## 2024-03-03 PROCEDURE — 74176 CT ABD & PELVIS W/O CONTRAST: CPT

## 2024-03-03 PROCEDURE — 96365 THER/PROPH/DIAG IV INF INIT: CPT | Performed by: PHYSICIAN ASSISTANT

## 2024-03-03 PROCEDURE — 99284 EMERGENCY DEPT VISIT MOD MDM: CPT | Performed by: PHYSICIAN ASSISTANT

## 2024-03-03 RX ORDER — CEFUROXIME AXETIL 500 MG/1
500 TABLET ORAL 2 TIMES DAILY
Qty: 14 TABLET | Refills: 0 | Status: SHIPPED | OUTPATIENT
Start: 2024-03-03 | End: 2024-03-10

## 2024-03-03 RX ORDER — CEFTRIAXONE 2 G/1
2 INJECTION, POWDER, FOR SOLUTION INTRAMUSCULAR; INTRAVENOUS ONCE
Status: COMPLETED | OUTPATIENT
Start: 2024-03-03 | End: 2024-03-03

## 2024-03-03 RX ORDER — POLYETHYLENE GLYCOL 3350 17 G/17G
17 POWDER, FOR SOLUTION ORAL 2 TIMES DAILY
Qty: 510 G | Refills: 0 | Status: SHIPPED | OUTPATIENT
Start: 2024-03-03

## 2024-03-03 RX ADMIN — SODIUM CHLORIDE 250 ML: 9 INJECTION, SOLUTION INTRAVENOUS at 16:07

## 2024-03-03 RX ADMIN — CEFTRIAXONE 2 G: 2 INJECTION, POWDER, FOR SOLUTION INTRAMUSCULAR; INTRAVENOUS at 16:08

## 2024-03-03 ASSESSMENT — ACTIVITIES OF DAILY LIVING (ADL)
ADLS_ACUITY_SCORE: 35

## 2024-03-03 ASSESSMENT — COLUMBIA-SUICIDE SEVERITY RATING SCALE - C-SSRS
2. HAVE YOU ACTUALLY HAD ANY THOUGHTS OF KILLING YOURSELF IN THE PAST MONTH?: NO
1. IN THE PAST MONTH, HAVE YOU WISHED YOU WERE DEAD OR WISHED YOU COULD GO TO SLEEP AND NOT WAKE UP?: NO
6. HAVE YOU EVER DONE ANYTHING, STARTED TO DO ANYTHING, OR PREPARED TO DO ANYTHING TO END YOUR LIFE?: NO

## 2024-03-03 NOTE — PROGRESS NOTES
March 4, 2024     Dear Dr. Pro,   I had the pleasure of seeing Clemente Graham  in the [facility]. As you know patient has a history of HTN, dyslipidemia, DM1, and CKD. He had been seen at [facility] in the past. In 2018 a stress test was done which mentioned a mild reversible defect in the mid to distal anterior wall, the test was done for episodes of dizziness. His dizziness resolved after hydrochlorothiazide was discontinued. At the time he was under consideration for pancreas transplant.  To me he noted that he continues to have episodes of dizziness at times which seems to be unrelated to every movement or activity.  These can last for a prolonged period of time sometimes for a day or 2.  Once it happened at work and once they needed to call the ambulance to home.  They did have Medicaid medication changes recently and again it is unclear whether it is related to that but hydralazine was taken down as needed and lisinopril was decreased from 40 mg daily to 20 mg daily.  This changes seem to have helped some potentially.  Otherwise notes that during these episodes the muscles seem to have tense up.  There is no clear episode of chest pains with this palpitations unclear but most likely not.  No other complaints overall.     PAST MEDICAL HISTORY:  Past Medical History:   Diagnosis Date    Carpal tunnel syndrome, bilateral     Diabetic foot ulcer (H)     Hyperlipidemia     Hypertension     Microalbuminuria     Neuropathy     Retinopathy     Trigger finger of both hands     Type 1 diabetes (H) 1982    Ulnar nerve entrapment at elbow, left     Urinary retention     Cellulitis right Great Toe, Distal Right Toe Amputation 2/13/2008    Chronic Hoarseness 05/01/1998 Chronic hoarseness over a month-Rhinolaryngoscope.    Diabetes Mellitus, Type 1, Diagnosis 1983, at age 17 05/03/2007 Type I diabetes 1983, at age 17. On insulin pump.    Dyslipidemia, Diagnosis about 2004 04/17/2006    Erectile  Dysfunction- 05/04/2007    Family History of ASCVD-MI-Father 05/03/2007    Family history of colon cancer 07/01/2013 (brother).    Family History of Prostate Cancer-Father 05/03/2007    GERD - Esophageal Reflux, In Remission 05/01/1998    GERD-Esophageal Reflux 05/01/1998   Intermittent reflux,some dyspepsia in the past,give another month,if it does not resolve,return for futher evaluation.    Hypertension, Diagnosis [id number] 02/11/2003    Insulin pump status 8/26/2010    Left Ankle Sprain 04/01/1999  Left ankle sprain,X-ray-No fractures or dislocations are seen.Posterior splint,ice,elevate,return as needed.    Left Hip Dislocation - Surgery 1980, Stabilization 09/10/1979  Slipped head of femur-[place] [facility] [place]. Kulkarni.    Left Rotator Cuff Surgical Repair, About 2005 02/10/2005  Status post arthroscopic labral repair.    Left trigger finger 03/16/2015 Index and ring finger.    Lipohypertrophy-Abdominal Wall-Insulin Related 05/03/2007    Nephropathy, Diabetic 8/26/2010    Neuropathy, Diabetic 8/26/2010    Osteomyelitis, Right Great Toe, Distal Amputation, 4/2010 04/07/2010    Retinopathy, Diabetic, Background 2001 03/22/2001   Right eye revealed multiple blot hemorrhages & scatter exudates throughout retina indicating moderated non-proliferative diabetic retinopathy; left eye showed no signs of diabetic retinopathy. Per 6/4/07, peripheral severe non-proliferative diabetic retinopathy W/O neovascularization; no macular edema (VitreoRetinal Surgery).    Right trigger finger 12/12/2014 Index and ring finger.    Tenosynovitis of finger 09/10/2015 Left long finger.    Trigger Finger-Left Thumb 10/09/2003 Left thumb,inject with Kenalog, ice for 48 hours and follow up as needed. Saw Dr. Driscoll on 3/27/00.    Ulnar nerve compression, left 03/16/2015 Left elbow.    Ulnar nerve compression, right 12/12/2014 Right elbow.     FAMILY HISTORY:  Family History   Problem Relation Age of Onset    Cancer Mother          ovarian    Prostate Cancer Father 65    Other - See Comments Sister         shot    Cancer Brother 50        had colon removed, ?colon cancer    Other - See Comments Son         lymphedema     SOCIAL HISTORY:   Cancer Mother ? type, uterine and metasasis, d age 60s    Cardiovascular Disease Father MI age 60 received stents, Hypertension,  from prostate cancer    Neuro Disease Maternal Grandfather CVA.    Pulmonary Disease Paternal Grandfather TB.    Cancer Maternal Grandmother    Diabetes Other Cousins.    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     - wife Jeanette    1 son- Will     Social Determinants of Health     Financial Resource Strain: Low Risk  (2024)    Financial Resource Strain     Within the past 12 months, have you or your family members you live with been unable to get utilities (heat, electricity) when it was really needed?: No   Food Insecurity: Low Risk  (2024)    Food Insecurity     Within the past 12 months, did you worry that your food would run out before you got money to buy more?: No     Within the past 12 months, did the food you bought just not last and you didn t have money to get more?: No   Transportation Needs: Low Risk  (2024)    Transportation Needs     Within the past 12 months, has lack of transportation kept you from medical appointments, getting your medicines, non-medical meetings or appointments, work, or from getting things that you need?: No    Interpersonal Safety: Low Risk  (2/26/2024)    Interpersonal Safety     Do you feel physically and emotionally safe where you currently live?: Yes     Within the past 12 months, have you been hit, slapped, kicked or otherwise physically hurt by someone?: No     Within the past 12 months, have you been humiliated or emotionally abused in other ways by your partner or ex-partner?: No   Housing Stability: Low Risk  (1/26/2024)    Housing Stability     Do you have housing? : Yes     Are you worried about losing your housing?: No     CURRENT MEDICATIONS:  No current facility-administered medications for this visit.     Current Outpatient Medications   Medication    aspirin (ASA) 81 MG tablet    atorvastatin (LIPITOR) 40 MG tablet    [START ON 4/4/2024] bisacodyl (DULCOLAX) 5 MG EC tablet    blood glucose (NO BRAND SPECIFIED) lancets standard    blood glucose (NO BRAND SPECIFIED) test strip    blood glucose monitoring (NO BRAND SPECIFIED) meter device kit    Catheters MISC    Continuous Blood Gluc Sensor (DEXCOM G6 SENSOR) MISC    Continuous Blood Gluc Transmit (DEXCOM G6 TRANSMITTER) MISC    famotidine (PEPCID) 20 MG tablet    furosemide (LASIX) 40 MG tablet    glucagon 1 MG kit    hydrALAZINE (APRESOLINE) 50 MG tablet    insulin aspart (NOVOLOG VIAL) 100 UNITS/ML vial    insulin aspart (NOVOPEN ECHO) 100 UNIT/ML cartridge    lisinopril (ZESTRIL) 20 MG tablet    loratadine (CLARITIN) 10 MG tablet    metolazone (ZAROXOLYN) 2.5 MG tablet    metoprolol tartrate (LOPRESSOR) 25 MG tablet    Multiple Vitamin (MULTI VITAMIN W/D-3) TABS    order for DME    [START ON 4/4/2024] polyethylene glycol (MIRALAX) 17 GM/Dose powder    Riboflavin (VITAMIN B-2 PO)    tamsulosin (FLOMAX) 0.4 MG capsule    vitamin D2 (ERGOCALCIFEROL) 80029 units (1250 mcg) capsule     Facility-Administered Medications Ordered in Other Visits   Medication    cefTRIAXone (ROCEPHIN) 2 g vial to attach to  ml bag for ADULTS or NS 50 ml bag for PEDS     "sodium chloride (PF) 0.9% PF flush 3 mL    sodium chloride (PF) 0.9% PF flush 3 mL     ROS:   Constitutional: No fever, chills, or sweats. Weight is 0 lbs 0 oz  ENT: No visual disturbance, ear ache, epistaxis, sore throat.   Allergies/Immunologic: Negative.   Respiratory: No cough, hemoptysis.   Cardiovascular: As per HPI.   GI: No nausea, vomiting, hematemesis, melena, or hematochezia.   : No urinary frequency, dysuria, or hematuria.   Integument: Negative.   Psychiatric: Pleasant, no major depression noted  Neuro: No focal neurological deficits noted  Endocrinology: Negative.   Musculoskeletal: As per HPI.      EXAM:  /60 (BP Location: Right arm, Patient Position: Sitting, Cuff Size: Adult Large)   Pulse 84   Temp 97.6  F (36.4  C) (Temporal)   Resp 16   Ht 1.93 m (6' 4\")   Wt 116.6 kg (257 lb)   SpO2 98%   BMI 31.28 kg/m    General: appears comfortable, alert and oriented  Head: normocephalic, atraumatic  Eyes: anicteric sclera, EOMI , PERRL  Neck: no adenopathy  Orophyarynx: moist mucosa, no lesions noted  Heart: regular, S1/S2, no murmurs, rubs or gallop. Estimated JVP at 5 cmH2O  Lungs: CTAB, No wheezing.   Abdomen: soft, non-tender, bowel sounds present, no hepatosplenomegaly  Extremities: No LE edema today  Skin: no open lesions noted  Neuro: grossly non-focal     Labs:  Lab Results   Component Value Date    WBC 6.9 03/03/2024    HGB 10.0 (L) 03/03/2024    HCT 28.5 (L) 03/03/2024     03/03/2024     02/02/2024    POTASSIUM 4.1 02/02/2024    CHLORIDE 101 02/02/2024    CO2 30 (H) 02/02/2024    BUN 63.6 (H) 02/02/2024    CR 3.81 (H) 02/02/2024     (H) 02/02/2024    NTBNP 326 09/28/2023    AST 32 09/28/2023    ALT 32 09/28/2023    ALKPHOS 88 09/28/2023    BILITOTAL 0.3 09/28/2023     TTE 12/8/2017:  The left ventricle is normal size.   The left ventricular systolic function is normal.   There is normal left ventricular wall thickness.   Wall motion is normal.   No significant " valvular disease present.     Exercise Cardiolite Stress Test 12/12/2017:  1. Probably negative treadmill exercise stress nuclear myocardial perfusion imaging study (SSS<4; J Am Kenia Cardiol 1993;22:1455-64)   2. There is a small size, mild defect involving the mid to distal anterior wall which is reversible compared to rest imaging indicating possible ischemia. Size and severity, however did not reach significance ie SSS<4.   3. Normal LVEF (>70%)      TTE 10/24/23:  Left ventricular function is normal.The ejection fraction is 55-60%. There is akinesis of the basal inferior and inferoseptal segments.  Global right ventricular function is normal.  No significant valvular abnormalities present.  Small inferior vena cava size consistent with hypovolemia.  No pericardial effusion is present.    Lexiscan 11/2023:    The nuclear stress test is negative for inducible myocardial ischemia or infarction.     Left ventricular function is normal.     The left ventricular ejection fraction at rest is 66%.  The left ventricular ejection fraction at stress is 68%.     There is no prior study for comparison.     ASSESSMENT AND PLAN:  In summary, patient is a 59 year old male with above past medical history including type 1 diabetes currently on insulin pump for many years, right bundle branch block on EKG, hypertension, chronic kidney disease that has been worsening now creatinine around 3.5-3.8, and episodes of dizziness who was referred to the clinic for further evaluation and management of this dizziness.  Patient has multiple reasons why he could be dizzy and exact reason remains unclear at this point.  Hypotension certainly is a possibility and I agree with decreasing the lisinopril to 20 mg daily.  I asked him to stop the hydralazine actually because taking it as needed can lead to significant swings in the blood pressure as well as lead to tachycardia.  If he needs additional blood pressure control either increasing back up  lisinopril or adding another agent such as amlodipine or nifedipine would be more beneficial that is long-acting and the provide more steady blood pressure control for him.  We will also place a Zio patch for 14 days to exclude any underlying arrhythmias that could cause this.  He does have a right bundle branch block on EKG that was reviewed today but no other findings.  That said patient's echo is not completely normal with some potential wall motion abnormalities that were noted.  Given that and the underlying type 1 diabetes mellitus he certainly is at high risk for underlying coronary disease.  While coronary angiogram would be very reasonable in this situation that would certainly not have his renal function and thus I decided to do a cardiac MRI which is possible as long as he is not on dialysis.  We have ordered a cardiac MRI with stress and contrast to evaluate any potential scar burden as well as for myocardial ischemia.  We discussed that if this is abnormal then we shall proceed with coronary angiogram especially in the setting of potential renal transplantation in the future.  We will also get ultrasounds of the neck carotids just to ensure that there is no underlying vascular disease that has not been evaluated for a year or 2 now.  We will also make a referral for our colleagues at renal transplant and pancreas transplant as discussed with patient and significant other.  We will try to schedule this at the same time with a cardiac MRI is done so that it can be done together and no need for repeat trip to [place].  Patient apparently has a renal biopsy pending which is scheduled to be done later this week in [place] however he has not heard back with any instruction in details yet.  Certainly will have the team review this if it becomes available.  I will see him back in about 2 months or when the studies return.     I spent a total of 75 minutes reviewing.  Medical history, face-to-face time with  patient and documentation and organizing care.  I appreciate the opportunity to participate in the care of Clemente Graham . Please do not hesitate to contact me with any further questions.    Sincerely,   Ravindra Rodríguez MD  [facility] Division of Cardiology

## 2024-03-03 NOTE — ED PROVIDER NOTES
"      EMERGENCY DEPARTMENT ENCOUNTER      NAME: Clemente Graham  AGE: 59 year old male  YOB: 1965  MRN: [id number]  EVALUATION DATE & TIME: 3/3/2024  3:34 PM    PCP: Tello Pro    ED PROVIDER: Hector Sanderson PA-C       CHIEF COMPLAINT:  Chief Complaint   Patient presents with    Fever    Flank Pain       ED COURSE, MEDICAL DECISION MAKING, FINAL IMPRESSION AND PLAN:     Assessment / Plan:  1. UTI (urinary tract infection)    2. Acute right-sided low back pain without sciatica    3. Type 1 diabetes mellitus (H)    4. CKD (chronic kidney disease)    5. Constipation        The patient was interviewed and examined.  HPI and physical exam as below.  Differential diagnosis and MDM Key Documentation Elements as below.  Vitals, triage note, and nursing notes were reviewed.  BP (!) 162/71   Pulse 74   Temp 99.3  F (37.4  C) (Tympanic)   Resp 18   Ht 1.93 m (6' 4\")   Wt 114.9 kg (253 lb 6.4 oz)   SpO2 95%   BMI 30.84 kg/m      Differential includes but is not limited to UTI, pyelonephritis, nephrolithiasis, small bowel obstruction, cholecystitis, appendicitis    Patient is afebrile with otherwise normal vital.  Patient was in no acute distress.  Patient with positive right CVA tenderness to physical exam.  UA today was positive for UTI as well is trace amount of hematuria.  Patient with no leukocytosis.  Lactic acid 2.3 initially, 1.5 after IV fluids.  Elevated procalcitonin.  Hemoglobin 10.0, stable.  Sodium 133.  Creatinine 3.32, near his baseline.  Glucose 243.    Patient given IV Rocephin for treatment of UTI.  Patient given IV fluids 150 mg, reduce given patient's history of congestive heart failure.  Patient tolerating range as well without complication.  CT abdomen pelvis showed a large amount of stool in the rectum but no other acute findings.    Patient feeling well here in the ER.  After discussion with patient about being admitted versus going home, patient would like to go " home.  Patient will be started on Ceftin 500 mg twice daily for treatment of UTI.  Patient needs MiraLAX for constipation.  Recommend close follow-up with primary care.  Return to the ER for any worsening of symptoms.    Pertinent Labs & Imaging studies reviewed. (See chart for details)  Results for orders placed or performed during the hospital encounter of 03/03/24   CT Abdomen Pelvis w/o Contrast    Impression    Impression: Moderate to large volume of stool in the rectum. Question  fecal impaction.    No renal or ureteral calculi are present. There is no hydronephrosis.    PRIYANK HAQ MD         SYSTEM ID:  [id number]   Comprehensive metabolic panel   Result Value Ref Range    Sodium 133 (L) 135 - 145 mmol/L    Potassium 4.9 3.4 - 5.3 mmol/L    Carbon Dioxide (CO2) 22 22 - 29 mmol/L    Anion Gap 14 7 - 15 mmol/L    Urea Nitrogen 72.1 (H) 8.0 - 23.0 mg/dL    Creatinine 3.32 (H) 0.67 - 1.17 mg/dL    GFR Estimate 21 (L) >60 mL/min/1.73m2    Calcium 9.5 8.6 - 10.0 mg/dL    Chloride 97 (L) 98 - 107 mmol/L    Glucose 243 (H) 70 - 99 mg/dL    Alkaline Phosphatase 67 40 - 150 U/L    AST 39 0 - 45 U/L    ALT 35 0 - 70 U/L    Protein Total 6.1 (L) 6.4 - 8.3 g/dL    Albumin 3.3 (L) 3.5 - 5.2 g/dL    Bilirubin Total 0.3 <=1.2 mg/dL   Lactic acid whole blood   Result Value Ref Range    Lactic Acid 2.3 (H) 0.7 - 2.0 mmol/L   Result Value Ref Range    Procalcitonin 1.00 (H) <0.50 ng/mL   Result Value Ref Range    Lipase 8 (L) 13 - 60 U/L   UA with Microscopic reflex to Culture    Specimen: Urine, Clean Catch   Result Value Ref Range    Color Urine Light Yellow Colorless, Straw, Light Yellow, Yellow    Appearance Urine Clear Clear    Glucose Urine Negative Negative mg/dL    Bilirubin Urine Negative Negative    Ketones Urine Negative Negative mg/dL    Specific Gravity Urine 1.013 1.000 - 1.030    Blood Urine Trace (A) Negative    pH Urine 5.5 5.0 - 9.0    Protein Albumin Urine 300 (A) Negative mg/dL    Urobilinogen Urine  Normal Normal, 2.0 mg/dL    Nitrite Urine Negative Negative    Leukocyte Esterase Urine Small (A) Negative    Bacteria Urine Few (A) None Seen /HPF    Mucus Urine Present (A) None Seen /LPF    RBC Urine 4 (H) <=2 /HPF    WBC Urine 12 (H) <=5 /HPF    Hyaline Casts Urine 12 (H) <=2 /LPF   CBC with platelets and differential   Result Value Ref Range    WBC Count 6.9 4.0 - 11.0 10e3/uL    RBC Count 3.24 (L) 4.40 - 5.90 10e6/uL    Hemoglobin 10.0 (L) 13.3 - 17.7 g/dL    Hematocrit 28.5 (L) 40.0 - 53.0 %    MCV 88 78 - 100 fL    MCH 30.9 26.5 - 33.0 pg    MCHC 35.1 31.5 - 36.5 g/dL    RDW 12.3 10.0 - 15.0 %    Platelet Count 248 150 - 450 10e3/uL    % Neutrophils 78 %    % Lymphocytes 11 %    % Monocytes 11 %    % Eosinophils 0 %    % Basophils 0 %    % Immature Granulocytes 0 %    NRBCs per 100 WBC 0 <1 /100    Absolute Neutrophils 5.4 1.6 - 8.3 10e3/uL    Absolute Lymphocytes 0.7 0.0 - 5.3 10e3/uL    Absolute Monocytes 0.8 0.0 - 1.3 10e3/uL    Absolute Eosinophils 0.0 0.0 - 0.7 10e3/uL    Absolute Basophils 0.0 0.0 - 0.2 10e3/uL    Absolute Immature Granulocytes 0.0 <=0.4 10e3/uL    Absolute NRBCs 0.0 10e3/uL   Lactic acid whole blood   Result Value Ref Range    Lactic Acid 1.5 0.7 - 2.0 mmol/L   Blood Culture Peripheral Blood    Specimen: Peripheral Blood   Result Value Ref Range    Culture No Growth    Blood Culture Peripheral Blood    Specimen: Peripheral Blood   Result Value Ref Range    Culture No Growth    Urine Culture    Specimen: Urine, Clean Catch   Result Value Ref Range    Culture >100,000 CFU/mL Staphylococcus aureus (A)        Susceptibility    Staphylococcus aureus - JOSEFINA     Linezolid  Susceptible      Nitrofurantoin  Susceptible      Oxacillin  Susceptible      Rifampin  Susceptible      Tetracycline  Susceptible      Trimethoprim/Sulfamethoxazole  Susceptible      Vancomycin  Susceptible      Ampicillin/ Sulbactam  Susceptible      Daptomycin  Susceptible      No results found for:  "\"ABORH\"      Reassessments, Medications, Interventions, & Response to Treatments:  -as above    Medications given during today's ER visit:  Medications   sodium chloride 0.9% BOLUS 250 mL (0 mLs Intravenous Stopped 3/3/24 1749)   cefTRIAXone (ROCEPHIN) 2 g vial to attach to  ml bag for ADULTS or NS 50 ml bag for PEDS (0 g Intravenous Stopped 3/3/24 1640)       Consultations:  None    Decision Rules, Medical Calculators, and Risk Stratification Tools:  None    MDM Key Documentation Elements for Patient's Evaluation:  Differential diagnosis to include high risk considerations: As above  Escalation to admission/observation considered: Admission/observation considered, patient meets admission criteria but patient states he would like to go home.  Discussions and management with other clinicians:    3a. Independent interpretation of testing performed by another health professional:  -No  3b. Discussion of management or test interpretation with another health professional: -No  Independent interpretation of tests:  Ordering and/or review of 3+ test(s)  Discussion of test interpretations with radiology:  No  History obtained from source other than patient or assessment requiring an independent historian:  No  Review of non-ED/external records:  review of 3+ records  Diagnostic tests considered but not ultimately performed/deferred:  - US Renal  Prescription medications considered but not prescribed:  -Bactrim  Chronic conditions affecting care:  -None  Care affected by social determinants of health:  -None    The patient's management involved:   - Laboratory studies  - Imaging studies  - Parenteral controlled substance  - Prescription drug management      A shared decision making model was used. Time was taken to answer all questions.  Patient and/or associated parties understood and were agreeable to treatment plan.  Plan and all results were discussed. Warning signs and close return precautions to return to the ED " given. Copy of results given. Discharged in stable condition. Discharged with discharge instructions outlining plan for further care and follow up.      New prescriptions started at today's ER visit  Discharge Medication List as of 3/3/2024  6:14 PM        START taking these medications    Details   cefuroxime (CEFTIN) 500 MG tablet Take 1 tablet (500 mg) by mouth 2 times daily for 7 days, Disp-14 tablet, R-0, E-Prescribe      !! polyethylene glycol (MIRALAX) 17 GM/Dose powder Take 17 g by mouth 2 times daily, Disp-510 g, R-0, E-Prescribe       !! - Potential duplicate medications found. Please discuss with provider.          =================================================================    HPI  Clemente Graham is a pleasant 59 year old male with history of type 1 diabetes and neurogenic bladder presents to the ER today with his wife for evaluation of fever and right flank/back pain.  Patient states yesterday he developed a fever 102.9 as well as pain in his right back region around 6 PM last night.  Nausea and vomiting yesterday but has improved today.  Pain comes and goes.  Currently pain is described as mild.  No recent travel.  Patient does self cath but denies any changes in his urine.  No constipation diarrhea.  No chest pain or shortness of breath.  Patient is worried about possible kidney infection      REVIEW OF SYSTEMS   Review of Systems  As above, otherwise ROS is unremarkable.      PAST MEDICAL HISTORY:  Past Medical History:   Diagnosis Date    Carpal tunnel syndrome, bilateral     Diabetic foot ulcer (H)     Hyperlipidemia     Hypertension     Microalbuminuria     Neuropathy     Retinopathy     Trigger finger of both hands     Type 1 diabetes (H) 1982    Ulnar nerve entrapment at elbow, left     Urinary retention        PAST SURGICAL HISTORY:  Past Surgical History:   Procedure Laterality Date    AMPUTATION Right     Great toe    CARPAL TUNNEL RELEASE RT/LT Bilateral     CLOSED REDUCTION,  PERCUTANEOUS PINNING HIP      age 14, pinned for dislocation    COLONOSCOPY      Due 2022. has had two, history of polyps. recommend 5 year follow-up    ROTATOR CUFF REPAIR RT/LT Bilateral        CURRENT MEDICATIONS:    Current Outpatient Medications   Medication Instructions    aspirin (ASA) 81 MG tablet 1 tablet, Oral, DAILY    atorvastatin (LIPITOR) 40 mg, Oral, DAILY    [START ON 4/4/2024] bisacodyl (DULCOLAX) 5 MG EC tablet Use as directed for colonoscopy prep    blood glucose (NO BRAND SPECIFIED) lancets standard Dispense item covered by insurance. Test blood sugar 6 times daily.    blood glucose (NO BRAND SPECIFIED) test strip Dispense item covered by insurance. Test blood sugar 6 times daily.    blood glucose monitoring (NO BRAND SPECIFIED) meter device kit Dispense option covered by insurance. Test blood sugar 6 times daily.    Catheters [facility] Magic3 14F ref #[id number] - use to self-catheterize 4 times daily.    Continuous Blood Gluc Sensor (DEXCOM G6 SENSOR) MISC 1 each, Does not apply, EVERY 10 DAYS, Change every 10 days.    Continuous Blood Gluc Transmit (DEXCOM G6 TRANSMITTER) MISC 1 each, Does not apply, EVERY 3 MONTHS, Change every 3 months.    famotidine (PEPCID) 20 mg, Oral, 2 TIMES DAILY    furosemide (LASIX) 40 MG tablet 80 mg daily.  If you notice a > 5 lb weight gain associated with water swelling or short of breath, take 80 mg twice daily for 5 days then back to 80 mg daily    glucagon 1 mg, Intramuscular, PRN    hydrALAZINE (APRESOLINE) 25 mg, Oral, 3 TIMES DAILY PRN    insulin aspart (NOVOLOG VIAL) 100 UNITS/ML vial     insulin aspart (NOVOPEN ECHO) 100 UNIT/ML cartridge Use with insulin pump. Max 70 units/day.    lisinopril (ZESTRIL) 20 mg, Oral, DAILY    loratadine (CLARITIN) 10 mg, Oral, DAILY    metolazone (ZAROXOLYN) 2.5 mg, Oral, EVERY OTHER DAY    metoprolol tartrate (LOPRESSOR) 25 mg, Oral, 2 TIMES DAILY    Multiple Vitamin (MULTI VITAMIN W/D-3) TABS 1 tablet, Oral, DAILY    order  for DME Automatic electronic sphygmomanometer including machine, cuff, tubing and all associated supplies. Essential Hypertension, chronic. Lifelong.    [START ON 4/4/2024] polyethylene glycol (MIRALAX) 17 GM/Dose powder Mix with 255g with 64 oz of Gatorade (not red or purple) drink at a rate of 8oz every 10 min as directed for colonoscopy prep    Riboflavin (VITAMIN B-2 PO) 50 mg, Oral, WEEKLY    tamsulosin (FLOMAX) 0.4 mg, Oral, DAILY    vitamin D2 (ERGOCALCIFEROL) 99822 units (1250 mcg) capsule Take 1 capsule (50,000) by mouth once a week       ALLERGIES:  No Known Allergies    FAMILY HISTORY:  Family History   Problem Relation Age of Onset    Cancer Mother         ovarian    Prostate Cancer Father 65    Other - See Comments Sister         shot    Cancer Brother 50        had colon removed, ?colon cancer    Other - See Comments Son         lymphedema       SOCIAL HISTORY:   Social History     Socioeconomic History    Marital status:      Spouse name: None    Number of children: None    Years of education: None    Highest education level: None   Tobacco Use    Smoking status: Never     Passive exposure: Never    Smokeless tobacco: Never    Tobacco comments:     no ecig   Vaping Use    Vaping Use: Never used   Substance and Sexual Activity    Alcohol use: Not Currently     Comment: maybe 2-3 drinks a month    Drug use: Never    Sexual activity: Yes     Partners: Female   Social History Narrative    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     - wife Jeanette    1 son- Will     Social Determinants of Health     Financial Resource Strain: Low Risk  (1/26/2024)    Financial Resource Strain     Within the past 12 months, have you or your family members you live with been unable to get utilities (heat, electricity) when it was really needed?: No   Food Insecurity: Low Risk  (2/26/2024)    Food Insecurity     Within the past 12 months, did you worry that your food would run out before you  "got money to buy more?: No     Within the past 12 months, did the food you bought just not last and you didn t have money to get more?: No   Transportation Needs: Low Risk  (1/26/2024)    Transportation Needs     Within the past 12 months, has lack of transportation kept you from medical appointments, getting your medicines, non-medical meetings or appointments, work, or from getting things that you need?: No   Interpersonal Safety: Low Risk  (2/26/2024)    Interpersonal Safety     Do you feel physically and emotionally safe where you currently live?: Yes     Within the past 12 months, have you been hit, slapped, kicked or otherwise physically hurt by someone?: No     Within the past 12 months, have you been humiliated or emotionally abused in other ways by your partner or ex-partner?: No   Housing Stability: Low Risk  (1/26/2024)    Housing Stability     Do you have housing? : Yes     Are you worried about losing your housing?: No       PHYSICAL EXAM    VITAL SIGNS: BP (!) 162/71   Pulse 74   Temp 99.3  F (37.4  C) (Tympanic)   Resp 18   Ht 1.93 m (6' 4\")   Wt 114.9 kg (253 lb 6.4 oz)   SpO2 95%   BMI 30.84 kg/m      No data found.      Physical Exam  Vital signs reviewed.  Nursing note reviewed    Constitutional: Alert with normal appearance.  Not ill-appearing, diaphoretic, or in acute distress.  Nose: Normal appearance  Mouth/throat: Moist.  Clear.  Eyes: Conjunctivae normal appearing  Neck: Range of motion normal.  Supple.  Cardiovascular: Normal rate.  Regular rhythm.  Pulses and heart sounds normal.  No murmurs, gallops, or rubs.  Capillary refill less than 2 seconds  Pulmonary: Effort normal.  Breath sounds normal without wheezes, rales, or rhonchi.  No chest wall tenderness  Abdomen: Flat, soft, normal bowel sounds.  No abdominal tenderness.  No right flank tenderness.  Positive right CVA tenderness.  Musculoskeletal: Range of motion normal  Skin: Warm and dry  Neurological: No focal deficits.  Alert " and oriented x3  Psychiatric/behavioral: Normal mood        LABS & RADIOLOGY:  All pertinent labs reviewed and interpreted. Reviewed all pertinent imaging. Please see official radiology report.  Results for orders placed or performed during the hospital encounter of 03/03/24   CT Abdomen Pelvis w/o Contrast    Impression    Impression: Moderate to large volume of stool in the rectum. Question  fecal impaction.    No renal or ureteral calculi are present. There is no hydronephrosis.    PRIYANK HAQ MD         SYSTEM ID:  [id number]   Comprehensive metabolic panel   Result Value Ref Range    Sodium 133 (L) 135 - 145 mmol/L    Potassium 4.9 3.4 - 5.3 mmol/L    Carbon Dioxide (CO2) 22 22 - 29 mmol/L    Anion Gap 14 7 - 15 mmol/L    Urea Nitrogen 72.1 (H) 8.0 - 23.0 mg/dL    Creatinine 3.32 (H) 0.67 - 1.17 mg/dL    GFR Estimate 21 (L) >60 mL/min/1.73m2    Calcium 9.5 8.6 - 10.0 mg/dL    Chloride 97 (L) 98 - 107 mmol/L    Glucose 243 (H) 70 - 99 mg/dL    Alkaline Phosphatase 67 40 - 150 U/L    AST 39 0 - 45 U/L    ALT 35 0 - 70 U/L    Protein Total 6.1 (L) 6.4 - 8.3 g/dL    Albumin 3.3 (L) 3.5 - 5.2 g/dL    Bilirubin Total 0.3 <=1.2 mg/dL   Lactic acid whole blood   Result Value Ref Range    Lactic Acid 2.3 (H) 0.7 - 2.0 mmol/L   Result Value Ref Range    Procalcitonin 1.00 (H) <0.50 ng/mL   Result Value Ref Range    Lipase 8 (L) 13 - 60 U/L   UA with Microscopic reflex to Culture    Specimen: Urine, Clean Catch   Result Value Ref Range    Color Urine Light Yellow Colorless, Straw, Light Yellow, Yellow    Appearance Urine Clear Clear    Glucose Urine Negative Negative mg/dL    Bilirubin Urine Negative Negative    Ketones Urine Negative Negative mg/dL    Specific Gravity Urine 1.013 1.000 - 1.030    Blood Urine Trace (A) Negative    pH Urine 5.5 5.0 - 9.0    Protein Albumin Urine 300 (A) Negative mg/dL    Urobilinogen Urine Normal Normal, 2.0 mg/dL    Nitrite Urine Negative Negative    Leukocyte Esterase Urine Small  (A) Negative    Bacteria Urine Few (A) None Seen /HPF    Mucus Urine Present (A) None Seen /LPF    RBC Urine 4 (H) <=2 /HPF    WBC Urine 12 (H) <=5 /HPF    Hyaline Casts Urine 12 (H) <=2 /LPF   CBC with platelets and differential   Result Value Ref Range    WBC Count 6.9 4.0 - 11.0 10e3/uL    RBC Count 3.24 (L) 4.40 - 5.90 10e6/uL    Hemoglobin 10.0 (L) 13.3 - 17.7 g/dL    Hematocrit 28.5 (L) 40.0 - 53.0 %    MCV 88 78 - 100 fL    MCH 30.9 26.5 - 33.0 pg    MCHC 35.1 31.5 - 36.5 g/dL    RDW 12.3 10.0 - 15.0 %    Platelet Count 248 150 - 450 10e3/uL    % Neutrophils 78 %    % Lymphocytes 11 %    % Monocytes 11 %    % Eosinophils 0 %    % Basophils 0 %    % Immature Granulocytes 0 %    NRBCs per 100 WBC 0 <1 /100    Absolute Neutrophils 5.4 1.6 - 8.3 10e3/uL    Absolute Lymphocytes 0.7 0.0 - 5.3 10e3/uL    Absolute Monocytes 0.8 0.0 - 1.3 10e3/uL    Absolute Eosinophils 0.0 0.0 - 0.7 10e3/uL    Absolute Basophils 0.0 0.0 - 0.2 10e3/uL    Absolute Immature Granulocytes 0.0 <=0.4 10e3/uL    Absolute NRBCs 0.0 10e3/uL   Lactic acid whole blood   Result Value Ref Range    Lactic Acid 1.5 0.7 - 2.0 mmol/L   Blood Culture Peripheral Blood    Specimen: Peripheral Blood   Result Value Ref Range    Culture No Growth    Blood Culture Peripheral Blood    Specimen: Peripheral Blood   Result Value Ref Range    Culture No Growth    Urine Culture    Specimen: Urine, Clean Catch   Result Value Ref Range    Culture >100,000 CFU/mL Staphylococcus aureus (A)        Susceptibility    Staphylococcus aureus - JOSEFINA     Linezolid  Susceptible      Nitrofurantoin  Susceptible      Oxacillin  Susceptible      Rifampin  Susceptible      Tetracycline  Susceptible      Trimethoprim/Sulfamethoxazole  Susceptible      Vancomycin  Susceptible      Ampicillin/ Sulbactam  Susceptible      Daptomycin  Susceptible      CT Abdomen Pelvis w/o Contrast   Final Result   Impression: Moderate to large volume of stool in the rectum. Question   fecal impaction.       No renal or ureteral calculi are present. There is no hydronephrosis.      PRIYANK HAQ MD            SYSTEM ID:  [id number]              I, Ronal Sanderson PA-C, personally performed the services described in this documentation, and it is both accurate and complete.       Hector Sanderson PA-C  03/14/24 1142

## 2024-03-03 NOTE — ED TRIAGE NOTES
"Pt arrived via personal vehicle with a c/o fever x1 day up to 102.9, and right sided flank pain. Pt denies frequency or burning during urination. Denies cough, chills but states he vomited a couple times yesterday. States he took tylenol at 1330.        Vital signs:  Temp: 99.3  F (37.4  C) Temp src: Tympanic BP: (!) 162/71 Pulse: 74   Resp: 18 SpO2: 95 % O2 Device: None (Room air)   Height: 193 cm (6' 4\") Weight: 114.9 kg (253 lb 6.4 oz)  Estimated body mass index is 30.84 kg/m  as calculated from the following:    Height as of this encounter: 1.93 m (6' 4\").    Weight as of this encounter: 114.9 kg (253 lb 6.4 oz).          Triage Assessment (Adult)       Row Name 03/03/24 1518          Triage Assessment    Airway WDL WDL        Respiratory WDL    Respiratory WDL WDL        Cardiac WDL    Cardiac WDL WDL        Peripheral/Neurovascular WDL    Peripheral Neurovascular WDL WDL        Cognitive/Neuro/Behavioral WDL    Cognitive/Neuro/Behavioral WDL WDL        Drew Coma Scale    Best Eye Response 4-->(E4) spontaneous     Best Motor Response 6-->(M6) obeys commands     Best Verbal Response 5-->(V5) oriented     Drew Coma Scale Score 15                     "

## 2024-03-04 ENCOUNTER — DOCUMENTATION ONLY (OUTPATIENT)
Dept: CARDIOLOGY | Facility: CLINIC | Age: 59
End: 2024-03-04

## 2024-03-04 ENCOUNTER — REFERRAL (OUTPATIENT)
Dept: TRANSPLANT | Facility: CLINIC | Age: 59
End: 2024-03-04

## 2024-03-04 ENCOUNTER — OFFICE VISIT (OUTPATIENT)
Dept: CARDIOLOGY | Facility: OTHER | Age: 59
End: 2024-03-04
Attending: INTERNAL MEDICINE
Payer: COMMERCIAL

## 2024-03-04 VITALS
HEART RATE: 84 BPM | DIASTOLIC BLOOD PRESSURE: 60 MMHG | TEMPERATURE: 97.6 F | HEIGHT: 76 IN | BODY MASS INDEX: 31.29 KG/M2 | OXYGEN SATURATION: 98 % | RESPIRATION RATE: 16 BRPM | WEIGHT: 257 LBS | SYSTOLIC BLOOD PRESSURE: 110 MMHG

## 2024-03-04 DIAGNOSIS — E10.43: ICD-10-CM

## 2024-03-04 DIAGNOSIS — N18.4 CHRONIC KIDNEY DISEASE, STAGE IV (SEVERE) (H): Primary | ICD-10-CM

## 2024-03-04 DIAGNOSIS — Z01.818 ENCOUNTER FOR PRE-TRANSPLANT EVALUATION FOR KIDNEY AND PANCREAS TRANSPLANT: ICD-10-CM

## 2024-03-04 DIAGNOSIS — I10 BENIGN ESSENTIAL HYPERTENSION: Chronic | ICD-10-CM

## 2024-03-04 DIAGNOSIS — Z76.82 ORGAN TRANSPLANT CANDIDATE: ICD-10-CM

## 2024-03-04 DIAGNOSIS — E11.628 DIABETIC FOOT INFECTION (H): ICD-10-CM

## 2024-03-04 DIAGNOSIS — E78.5 HYPERLIPIDEMIA: ICD-10-CM

## 2024-03-04 DIAGNOSIS — L08.9 DIABETIC FOOT INFECTION (H): ICD-10-CM

## 2024-03-04 DIAGNOSIS — E44.0 MODERATE PROTEIN-CALORIE MALNUTRITION (H): ICD-10-CM

## 2024-03-04 DIAGNOSIS — R00.2 PALPITATIONS: Primary | ICD-10-CM

## 2024-03-04 DIAGNOSIS — E10.9 TYPE 1 DIABETES MELLITUS (H): ICD-10-CM

## 2024-03-04 DIAGNOSIS — R42 DIZZINESS AND GIDDINESS: ICD-10-CM

## 2024-03-04 DIAGNOSIS — I10 ESSENTIAL HYPERTENSION: ICD-10-CM

## 2024-03-04 DIAGNOSIS — I50.33 ACUTE ON CHRONIC DIASTOLIC HEART FAILURE (H): ICD-10-CM

## 2024-03-04 DIAGNOSIS — I25.10 CARDIOVASCULAR DISEASE: ICD-10-CM

## 2024-03-04 DIAGNOSIS — I50.33 ACUTE ON CHRONIC HEART FAILURE WITH PRESERVED EJECTION FRACTION (H): ICD-10-CM

## 2024-03-04 DIAGNOSIS — E78.2 MIXED HYPERLIPIDEMIA: Chronic | ICD-10-CM

## 2024-03-04 DIAGNOSIS — R42 DIZZINESS: ICD-10-CM

## 2024-03-04 DIAGNOSIS — E10.21 DIABETIC NEPHROPATHY ASSOCIATED WITH TYPE 1 DIABETES MELLITUS (H): ICD-10-CM

## 2024-03-04 PROCEDURE — 93246 EXT ECG>7D<15D RECORDING: CPT | Performed by: INTERNAL MEDICINE

## 2024-03-04 PROCEDURE — 99205 OFFICE O/P NEW HI 60 MIN: CPT | Performed by: INTERNAL MEDICINE

## 2024-03-04 PROCEDURE — 93000 ELECTROCARDIOGRAM COMPLETE: CPT | Performed by: INTERNAL MEDICINE

## 2024-03-04 PROCEDURE — 93248 EXT ECG>7D<15D REV&INTERPJ: CPT | Performed by: INTERNAL MEDICINE

## 2024-03-04 ASSESSMENT — PAIN SCALES - GENERAL: PAINLEVEL: NO PAIN (0)

## 2024-03-04 NOTE — PROGRESS NOTES
In patient chart due to RT having difficult charting RT placement.     New orders needed to be placed, signed and documented per RT.     Tamiko Spence LPN on 3/4/2024 at 4:16 PM

## 2024-03-04 NOTE — DISCHARGE INSTRUCTIONS
-Take Ceftin 500 mg every 12 hours for 7 days for treatment of UTI and possible early pyelonephritis.  -You also had a large amount of stool on your CT.  Recommend MiraLAX 17 g once to twice a day until you have a normal bowel movement.  -Please return to the ER if you have worsening fever, abdominal pain, vomiting, or any other concerning symptoms  -Would recommend following up with your primary care doctor within 1 week for reevaluation.

## 2024-03-04 NOTE — PATIENT INSTRUCTIONS
- Please stop hydralazine and do not take as-needed basis at the moment associated dizziness gets better  - Agree with decreasing lisinopril to 20 mg daily  - Cardiac MRI with stress and contrast has been ordered.  This should be safe from the kidney standpoint, the MRI contrast will not hurt the kidney and will not expected to have significant side effects  - Ultrasounds of the neck arteries have been ordered  - Zio patch for 14 days has been ordered and will be placed in the clinic.  Will review the results as they become available  - Referral to our kidney and pancreas transplant group has been placed.  Anterior this will be completed when you are already in the [facility] for [facility] cardiac [facility]  - I will see her back in about 2-month or as to the results become available.

## 2024-03-04 NOTE — LETTER
3/13/24    Clemente Barnes Gladys  [address] [address] [id number]      Dear Clemente,    Thank you for your interest in the Transplant Center at [facility]. We look forward to being a part of your care team and assisting you through the transplant process.    As we discussed, your transplant coordinator is Leigh Brown (Pancreas, Kidney).  You may call your coordinator at any time with questions or concerns.  Your first scheduled call will be on 3/19/24 between 8:00 and 12:00.  If this needs to change, call [phone].    Please complete the following.    Fill out and return the enclosed forms  Authorization for Electronic Communication  Authorization to Discuss Protected Health Information  Authorization for Release of Protected Health Information  Authorization for Care Everywhere Release of Information    Sign up for:  [employer], access to your electronic medical record (see enclosed pamphlet)  [employer], a transplant education website        You can use these tools to learn more about your transplant, communicate with your care team, and track your medical details          Sincerely,      Solid Organ Transplant  [facility]    cc: Care Team

## 2024-03-04 NOTE — PROGRESS NOTES
Patient arrived (date)03/04/2024 (time)1535 for a 14 day Zio monitor per (provider name) Marcos Waite for Palpitations (Dx).  Patient's skin was prepped per protocol.  Zio monitor was placed.  Patient verbalized understanding of instructions and plan of care.  Patient was given Zio diary and prepaid .

## 2024-03-04 NOTE — LETTER
Clemente Barnes [place]  [address] [address] [address]                May 31, 2024      Tiffani Martinez,     You have recently spoken with my co-worker, Annel DOYLE in my absence on that day, over the phone in preparation of your pre-kidney pancreas transplant evaluation. I am sending this letter to review the pre-transplant information she covered.     Please do see your schedule in your My Chart for your pre-kidney pancreas transplant evaluation on 06/20/2024 starting at 7:30 AM. [facility]  [address] [place] [address]    For parking options, please park in the open lot across the street from the front door of our Clinic.  Otherwise, enter the [facility] / petar cifuentes from [facility] and attendants can assist you based on your needs.  parking is available for those with limited mobility M-F from 7:00 am to 5:00 pm.     All in-person provider appointments will be on the third floor, 3A. Additional evaluation tests will be on the first floor. There are help desks in the clinic that can provide additional information, if you should need assistance.    Please bring your designated care person(s) to your appointment day to help listen to the patient education and ask questions that are important to you. You can eat/drink normally on this day. Please do not fast, as the appointment day will most likely go until 3 PM. There is a coffee shop on street level for you to purchase food and you can also bring food from home. Please be aware there are no microwaves or refrigerators for patient use at the clinic. Also, take all your prescribed medications as ordered on this day. There are no medication lab tests ordered during your appointments.    Upon completion of your appointments, I will compile the outcomes and have your results reviewed at the Multidisciplinary Selection Committee on Wednesday,  06/26/2024, of the following week. This is a medical review meeting only, you will not be asked to attend. I will call you within 10 business days after this meeting to inform you of the outcomes and to assist in planning for the completion of your evaluation. I will also send you a transplant evaluation summary letter after our call.     You will receive an email from our Transplant Office which contains a Receipt of Information consent and patient educational materials. Please read and electronically sign the Receipt of Information consent as well as read the educational materials prior to your evaluation appointments on 06/20/2024.     Your virtual pre transplant patient education class is scheduled for 06/10/2024 at 8:00 am. Education must be completed prior to activation on the transplant list.   For your future reference, I have included the link to the same videos you will watch in class here.     Additional transplant resources are as follows:   www.unos.org. UNOS, or [employer] Organ [employer]aring, is the national organization in our country that maintains all the organ wait lists and is responsible for the rules and regulations for organ allocation. I recommend looking at the Transplant Living section as this area has content created for patients.   www.srtr.org SRTR, or the Scientific Registry for Transplant Recipients is a national data base that all Transplant Centers report their success and failure rate for all organ types twice per year. The results are public knowledge and do provide a good perspective of organ transplant.     If the transplant providers tell you at your appointments that you should start to have live donors register with our Program to initiate their evaluations, please provide this registration website: [email or web address]   The donor will receive a detailed email response back with information and next steps specific to their situation. It is important  that the donor responds to the email in order to move forward with their evaluation. Donors can also call our Office and ask to speak with a live donor coordinator in the event of questions at [phone].     Please let me know of any questions or concerns.     Thank you,  Leigh Brown, RN, BSN  Pre Kidney Pancreas Transplant Coordinator   [facility]  Solid Organ Transplant [facility]   [address] [place] [address]  [email or web address]   Office Number: [phone] Direct Number: [phone]   Fax Number: [phone]  Employed by [facility]     CC's: Dr. Tello Pro, Dr. Clark Culver

## 2024-03-04 NOTE — NURSING NOTE
"Chief Complaint   Patient presents with    Consult     Acute CHF      Patient presents to the clinic today for a consult for acute Congestive Heart Failure  Initial There were no vitals taken for this visit. Estimated body mass index is 30.84 kg/m  as calculated from the following:    Height as of 3/3/24: 1.93 m (6' 4\").    Weight as of 3/3/24: 114.9 kg (253 lb 6.4 oz).  Meds Reconciled: complete      Randi Alonso LPN,SHAAN on 3/4/2024 at 1:41 PM  Ext. [phone]        Randi Alonso LPN  "

## 2024-03-05 ENCOUNTER — TELEPHONE (OUTPATIENT)
Dept: EMERGENCY MEDICINE | Facility: OTHER | Age: 59
End: 2024-03-05
Payer: COMMERCIAL

## 2024-03-05 LAB
ATRIAL RATE - MUSE: 73 BPM
BACTERIA UR CULT: ABNORMAL
DIASTOLIC BLOOD PRESSURE - MUSE: NORMAL MMHG
INTERPRETATION ECG - MUSE: NORMAL
P AXIS - MUSE: 62 DEGREES
PR INTERVAL - MUSE: 160 MS
QRS DURATION - MUSE: 168 MS
QT - MUSE: 468 MS
QTC - MUSE: 515 MS
R AXIS - MUSE: 243 DEGREES
SYSTOLIC BLOOD PRESSURE - MUSE: NORMAL MMHG
T AXIS - MUSE: 42 DEGREES
VENTRICULAR RATE- MUSE: 73 BPM

## 2024-03-05 NOTE — TELEPHONE ENCOUNTER
[facility] [place]    Reason for call: Lab Result Notification     Lab Result (including Rx patient on, if applicable).  If culture, copy of lab report at bottom.  Lab Result: Final Urine Culture Report on 3/5/24  [facility] Emergency Dept discharge antibiotic prescribed: Cefuroxime (Ceftin) 500 mg PO tablet, 1 tablet (500 mg) by mouth 2 times daily for 7 days   Date of Rx (if applicable):  3/3/24     #1. Bacteria, >100,000 CFU/ML  Staphylococcus aureus is SUSCEPTIBLE to Antibiotic.    No change in treatment per [facility] ED lab result Urine Culture protocol.    Creatinine Level (mg/dl)   Creatinine   Date Value Ref Range Status   03/03/2024 3.32 (H) 0.67 - 1.17 mg/dL Final   04/08/2021 1.91 (H) 0.70 - 1.30 mg/dL Final    Creatinine clearance (ml/min), if applicable    Serum creatinine: 3.32 mg/dL (H) 03/03/24 1546  Estimated creatinine clearance: 33.4 mL/min (A)     Patient's current Symptoms:   Pt reports fever and back pain have both improved greatly after starting ABX. Relayed result and recommendations     RN Recommendations/Instructions per [facility] lab result protocol:   [facility] ED lab result protocol utilized: Urine Cx    Patient/care giver notified to contact your PCP clinic or return to the Emergency department if your:  Symptoms return.  Symptoms do not improve after 3 days on antibiotic.  Symptoms do not resolve after completing antibiotic.  Symptoms worsen or other concerning symptoms.    Parviz Murray RN

## 2024-03-08 LAB
BACTERIA BLD CULT: NO GROWTH
BACTERIA BLD CULT: NO GROWTH

## 2024-03-13 VITALS — WEIGHT: 265 LBS | HEIGHT: 76 IN | BODY MASS INDEX: 32.27 KG/M2

## 2024-03-13 NOTE — TELEPHONE ENCOUNTER
PCP: Tello Pro MD  Referring Organization: [facility]  Referring Provider: Dr. Rodríguez  Referring Diagnosis: CKD, diabetes    GFR/Date: 21 (3/3/24)    Is patient under the age of 65? yes  Is patient diabetic? yes  Is patient on insulin? yes  Was patient offered a pancreas transplant referral? yes    Previous transplants: no  Cancer history: no  Cardiac history: none known, has upcoming cardiac MRI 6/19/24 at [facility] due to dizzy spells  Biopsy: no  Oxygen use at rest: no with activity: no    BMI: 32.26 (BMI> 40 - RNCC call only/ no PKE date)    Is patient in a group home/assisted living? no  Does patient have a guardian? no    Referral intake process completed.  Patient is aware that after financial approval is received, medical records will be requested.   Patient confirmed for a callback from transplant coordinator on 3/19/24. (within 2 weeks)  Tentative evaluation date 6/19/24- calendar is not that far out, but patient is scheduled for a cardiac MRI that morning at 9 am at the [facility]. He lives 4 hours away and would like to do his evaluation that day if possible  (within 4 weeks) if appointment is virtual, does patient have capabilities of setting this up? N/A    Confirmed coordinator will discuss evaluation process in more detail at the time of their call.   Patient is aware of the need to arrange age appropriate cancer screening, vaccinations, and dental care.  Reminded patient to complete questionnaire, complete medical records release, and review packet prior to evaluation visit .  Assessed patient for special needs (ie-wheelchair, assistance, guardian, and ):  None needed.   Patient instructed to call [phone] with questions.     Patient gave verbal consent during intake call to obtain medical records and documents outside of [facility]:  Yes

## 2024-03-28 NOTE — TELEPHONE ENCOUNTER
Contacted patient and introduced myself as their Transplant Coordinator, also introduced the role of the Transplant Coordinator in the transplant process.  Explained the purpose of this call including reviewing next steps and answering questions.    Confirmed Referring Provider, Dialysis Center, and Primary Care Physician. Notified patient of the importance of continued communication with referring providers and primary care physicians.    Reviewed components of transplant evaluation process including necessary appointments, tests, and procedures.    Answered questions for patient regarding evaluation, provided my name and contact information and requested they call with any additional questions.    Determined that patient would like additional information regarding transplant by:     Drop Down choices: Mail, Email, MyChart, Phone Call   Encourage MyChart   Notified patients that they will hear from a Transplant  to schedule evaluation.     Reviewed medical records and interviewed the patient.  CKD with qualifying GFR of 20 on 11/20/2023 now rebounded up to 27. DM1 since 1983 and on an insulin pump. Reviewing CE it was noted he had a Rituxan infusion at [facility]. Patient was told he had an autoimmune disease by his nephrologist and the kidney biopsy was canceled. He is scheduled for another infusion. HTN, hyperlipidemia, GERD, diabetic retinopathy and peripheral neuropathy. Has urinary retention and self caths twice a day. Past surgeries include toe amputations, rottor cuff, trigger finger release, hip pinning, carpal tunnel and ulnar nerve. He said he has never been told he has arthritis but has plenty of aches. Never received blood, non smoker, social etoh and no recreational drugs, BMI 31.5. Lives with wife and son and they will be able to assist him. Independent in all ADL's. There are potential live donors. No diabetic foot issues. He follows here with Dr Waite of cardiology initially for dizziness.He  is due for colonoscopy. Full dentures.  We discussed the evaluation day scheduled for 6/20/2024. He will arrive by 0730 and was encouraged to bring someone with him. Able to eat breakfast and take morning medications including insulin. Reviewed the goals of an evaluation, approval process, list of providers he will see and their roles. And the format for the evaluation day. He is aware he will be receiving electronic communication via [employer]. Virtual MTP is scheduled for 6/10/2024. Of note, he will be having a MR cardiac and stress test on 6/19/2024.  Smart set orders routed to scheduling.  Pre transplant patient education letter sent.

## 2024-04-01 ENCOUNTER — OFFICE VISIT (OUTPATIENT)
Dept: PEDIATRICS | Facility: OTHER | Age: 59
End: 2024-04-01
Attending: INTERNAL MEDICINE
Payer: COMMERCIAL

## 2024-04-01 ENCOUNTER — LAB (OUTPATIENT)
Dept: LAB | Facility: OTHER | Age: 59
End: 2024-04-01
Attending: INTERNAL MEDICINE
Payer: COMMERCIAL

## 2024-04-01 VITALS
HEIGHT: 76 IN | BODY MASS INDEX: 31.83 KG/M2 | OXYGEN SATURATION: 98 % | TEMPERATURE: 98.3 F | HEART RATE: 78 BPM | WEIGHT: 261.4 LBS | RESPIRATION RATE: 16 BRPM | SYSTOLIC BLOOD PRESSURE: 126 MMHG | DIASTOLIC BLOOD PRESSURE: 66 MMHG

## 2024-04-01 DIAGNOSIS — I10 BENIGN ESSENTIAL HYPERTENSION: ICD-10-CM

## 2024-04-01 DIAGNOSIS — E10.59 TYPE 1 DIABETES MELLITUS WITH OTHER CIRCULATORY COMPLICATION (H): Chronic | ICD-10-CM

## 2024-04-01 DIAGNOSIS — N18.4 TYPE 1 DIABETES MELLITUS WITH STAGE 4 CHRONIC KIDNEY DISEASE (H): ICD-10-CM

## 2024-04-01 DIAGNOSIS — N04.21 PRIMARY MEMBRANOUS NEPHROPATHY WITH NEPHROTIC SYNDROME: Primary | ICD-10-CM

## 2024-04-01 DIAGNOSIS — E10.22 TYPE 1 DIABETES MELLITUS WITH STAGE 4 CHRONIC KIDNEY DISEASE (H): ICD-10-CM

## 2024-04-01 DIAGNOSIS — N18.4 CHRONIC KIDNEY DISEASE, STAGE IV (SEVERE) (H): ICD-10-CM

## 2024-04-01 LAB
ANION GAP SERPL CALCULATED.3IONS-SCNC: 7 MMOL/L (ref 7–15)
BUN SERPL-MCNC: 41.6 MG/DL (ref 8–23)
CALCIUM SERPL-MCNC: 9.1 MG/DL (ref 8.6–10)
CHLORIDE SERPL-SCNC: 101 MMOL/L (ref 98–107)
CREAT SERPL-MCNC: 2.36 MG/DL (ref 0.67–1.17)
DEPRECATED HCO3 PLAS-SCNC: 28 MMOL/L (ref 22–29)
EGFRCR SERPLBLD CKD-EPI 2021: 31 ML/MIN/1.73M2
GLUCOSE SERPL-MCNC: 223 MG/DL (ref 70–99)
POTASSIUM SERPL-SCNC: 4.7 MMOL/L (ref 3.4–5.3)
SODIUM SERPL-SCNC: 136 MMOL/L (ref 135–145)

## 2024-04-01 PROCEDURE — 99214 OFFICE O/P EST MOD 30 MIN: CPT | Performed by: INTERNAL MEDICINE

## 2024-04-01 PROCEDURE — 80048 BASIC METABOLIC PNL TOTAL CA: CPT | Mod: ZL

## 2024-04-01 PROCEDURE — 36415 COLL VENOUS BLD VENIPUNCTURE: CPT | Mod: ZL

## 2024-04-01 ASSESSMENT — PAIN SCALES - GENERAL: PAINLEVEL: NO PAIN (0)

## 2024-04-01 NOTE — PROGRESS NOTES
"Assessment & Plan   1. Primary membranous nephropathy with nephrotic syndrome  2. Type 1 diabetes mellitus with other circulatory complication (H)  3. Type 1 diabetes mellitus with stage 4 chronic kidney disease (H)  4. Chronic kidney disease, stage IV (severe) (H)    Some very good news here.  His kidney function has improved to his first dose of rituximab.  Close follow-up with nephrology is appreciated and recommended.    The patient has type 1 diabetes.  He is adhering to his CGM regimen.      Return if symptoms worsen or fail to improve.    Signed, Tello Pro MD, FAAP, FACP  Internal Medicine & Pediatrics    Subjective   Clemente Graham is a 59 year old male who presents for kidney and hypertension follow-up.    Objective   Vitals: /66   Pulse 78   Temp 98.3  F (36.8  C) (Tympanic)   Resp 16   Ht 1.93 m (6' 4\")   Wt 118.6 kg (261 lb 6.4 oz)   SpO2 98%   BMI 31.82 kg/m        Review and Analysis of Data   I personally reviewed the following:  External notes: Yes nephrology  Results: Yes BMPs  Use of an independent historian: Yes wife  Independent review of a test performed by another physician: No  Discussion of management with another physician: No  Moderate risk of morbidity from additional diagnostic testing and/or treatment.    "

## 2024-04-01 NOTE — NURSING NOTE
"Chief Complaint   Patient presents with    Hypertension     Follow up and lab is done       Initial /66   Pulse 78   Temp 98.3  F (36.8  C) (Tympanic)   Resp 16   Ht 1.93 m (6' 4\")   Wt 118.6 kg (261 lb 6.4 oz)   SpO2 98%   BMI 31.82 kg/m   Estimated body mass index is 31.82 kg/m  as calculated from the following:    Height as of this encounter: 1.93 m (6' 4\").    Weight as of this encounter: 118.6 kg (261 lb 6.4 oz).  Medication Review: complete    The next two questions are to help us understand your food security.  If you are feeling you need any assistance in this area, we have resources available to support you today.          2/26/2024   SDOH- Food Insecurity   Within the past 12 months, did you worry that your food would run out before you got money to buy more? N   Within the past 12 months, did the food you bought just not last and you didn t have money to get more? N         Health Care Directive:  Patient does not have a Health Care Directive or Living Will: Discussed advance care planning with patient; however, patient declined at this time.    Norma J. Gosselin, LPN      "

## 2024-04-17 ENCOUNTER — HOSPITAL ENCOUNTER (OUTPATIENT)
Dept: ULTRASOUND IMAGING | Facility: OTHER | Age: 59
Discharge: HOME | End: 2024-04-17
Attending: INTERNAL MEDICINE | Admitting: INTERNAL MEDICINE
Payer: COMMERCIAL

## 2024-04-17 DIAGNOSIS — E10.43: ICD-10-CM

## 2024-04-17 DIAGNOSIS — E78.2 MIXED HYPERLIPIDEMIA: Chronic | ICD-10-CM

## 2024-04-17 DIAGNOSIS — R42 DIZZINESS: ICD-10-CM

## 2024-04-17 PROCEDURE — 93880 EXTRACRANIAL BILAT STUDY: CPT

## 2024-05-04 ENCOUNTER — HEALTH MAINTENANCE LETTER (OUTPATIENT)
Age: 59
End: 2024-05-04

## 2024-05-15 ENCOUNTER — MYC MEDICAL ADVICE (OUTPATIENT)
Dept: PEDIATRICS | Facility: OTHER | Age: 59
End: 2024-05-15
Payer: COMMERCIAL

## 2024-05-22 ENCOUNTER — TELEPHONE (OUTPATIENT)
Dept: EDUCATION SERVICES | Facility: OTHER | Age: 59
End: 2024-05-22
Payer: COMMERCIAL

## 2024-05-22 DIAGNOSIS — N18.4 TYPE 1 DIABETES MELLITUS WITH STAGE 4 CHRONIC KIDNEY DISEASE (H): Primary | ICD-10-CM

## 2024-05-22 DIAGNOSIS — E10.22 TYPE 1 DIABETES MELLITUS WITH STAGE 4 CHRONIC KIDNEY DISEASE (H): Primary | ICD-10-CM

## 2024-05-22 RX ORDER — INSULIN ASPART INJECTION 100 [IU]/ML
3 INJECTION, SOLUTION SUBCUTANEOUS
Qty: 40 ML | Refills: 11 | Status: SHIPPED | OUTPATIENT
Start: 2024-05-22

## 2024-05-22 NOTE — TELEPHONE ENCOUNTER
Patient states he will be out of insulin soon, he is unsure which RA insulin is covered by his insurance, but states he uses about 5 vails per month.    Per formulary review, FIASP is now preferred on his insurance plan.  Novolog and Fiasp are 1:1 conversion.    Fiasp prescription sent to his pharmacy for fill.    Patient also interested in a new insulin pump as his pump is now out of warranty.  He will visit with [facility] when he decides which pump he would like to use.    Bindu Spring RN, BSN, [facility]  5/22/2024 9:11 AM

## 2024-06-05 ENCOUNTER — TELEPHONE (OUTPATIENT)
Dept: TRANSPLANT | Facility: CLINIC | Age: 59
End: 2024-06-05
Payer: COMMERCIAL

## 2024-06-05 NOTE — LETTER
Clemente Barnes [place]  [address] [address] [id number] June 7, 2024      Tiffani Clemente,       You were successfully referred to our kidney pancreas transplant Program on 03/04/2024 by Dr. Culver.  You were then scheduled for a full pre kidney pancreas transplant evaluation on 06/20/2024.  I did receive your recent message asking to cancel your evaluation and to not reschedule.  Your evaluation appointments have now been cancelled per your request. Your referral has also ended at this time.     Please do feel free to call our Office in the future at any time if you do wish to proceed with a pre kidney pancreas transplant evaluation.       Sincerely,   Leigh Brown, RN, BSN  Pre Kidney Pancreas Transplant Coordinator   [facility]  Solid Organ Transplant Care   [address] [place] [address]  [email or web address]   Office Number: [phone] Direct Number: [phone]   Fax Number: [phone]  Employed by [facility] [employer]     CC's: Dr. Tello Pro,  Dr. Clark Culver

## 2024-06-05 NOTE — TELEPHONE ENCOUNTER
Called Dr. Clark Culver[facility] today and explained patient has requested that we cancel his pre transplant evaluation on 06/20/2024 because he has received an infusion and so does not need a transplant. I asked for Dr. Culver's input on this and that if we cancel his eval, it will be Sept before we could reschedule.

## 2024-06-05 NOTE — TELEPHONE ENCOUNTER
Pt calling to cancel his upcoming evaluation.  He received a infusion and his Dr said number got better and we would probably cancel his evaluation  If questions give him a call

## 2024-06-05 NOTE — TELEPHONE ENCOUNTER
[facility] Call Center    Phone Message    May a detailed message be left on voicemail: yes     Reason for Call: Other: patient calling in wanting to cancel upcoming evaluation. Patient stated he has received a few infusions and his provider told him that his results are better now and that we wouldn't do the transplant for him due to his results. He is not wanting to reschedule just cancel.     Action Taken: Message routed to:  Other: RNCC    Travel Screening: Not Applicable     Date of Service:

## 2024-06-21 DIAGNOSIS — K21.9 GASTROESOPHAGEAL REFLUX DISEASE WITHOUT ESOPHAGITIS: ICD-10-CM

## 2024-06-26 RX ORDER — FAMOTIDINE 20 MG/1
20 TABLET, FILM COATED ORAL 2 TIMES DAILY
Qty: 180 TABLET | Refills: 3 | Status: SHIPPED | OUTPATIENT
Start: 2024-06-26

## 2024-06-26 NOTE — TELEPHONE ENCOUNTER
[facility] sent Rx request for the following:      Requested Prescriptions   Pending Prescriptions Disp Refills    famotidine (PEPCID) 20 MG tablet [Pharmacy Med Name: Famotidine 20 MG Oral Tablet (Pepcid)] 180 tablet 3     Sig: Take 1 tablet (20 mg) by mouth 2 times daily       H2 Blockers Protocol Passed - 6/26/2024  9:19 AM   Last Prescription Date:   8/10/23  Last Fill Qty/Refills:         180, R-3    Last Office Visit:              4/1/24   Future Office visit:            none     Jennifer Stark RN on 6/26/2024 at 9:20 AM

## 2024-07-02 DIAGNOSIS — N18.31 STAGE 3A CHRONIC KIDNEY DISEASE (H): ICD-10-CM

## 2024-07-08 RX ORDER — ERGOCALCIFEROL 1.25 MG/1
CAPSULE, LIQUID FILLED ORAL
Qty: 12 CAPSULE | Refills: 2 | OUTPATIENT
Start: 2024-07-08

## 2024-07-08 NOTE — TELEPHONE ENCOUNTER
Vitamin D (Ergocalciferol) 1.25 MG (31147 UT) Oral Capsule   Medication filled on 1/29/24 for 12 tabs with 4 additional refills.  Refills available, request too soon. Unable to complete prescription refill per RNMedication Refill Policy.................... Elif Brennan RN ....................  7/8/2024   11:27 AM

## 2024-07-29 ENCOUNTER — HOSPITAL ENCOUNTER (OUTPATIENT)
Dept: GENERAL RADIOLOGY | Facility: OTHER | Age: 59
Discharge: HOME | End: 2024-07-29
Attending: NURSE PRACTITIONER
Payer: COMMERCIAL

## 2024-07-29 ENCOUNTER — OFFICE VISIT (OUTPATIENT)
Dept: FAMILY MEDICINE | Facility: OTHER | Age: 59
End: 2024-07-29
Attending: NURSE PRACTITIONER
Payer: COMMERCIAL

## 2024-07-29 VITALS
DIASTOLIC BLOOD PRESSURE: 77 MMHG | HEIGHT: 76 IN | BODY MASS INDEX: 31.64 KG/M2 | RESPIRATION RATE: 18 BRPM | SYSTOLIC BLOOD PRESSURE: 146 MMHG | OXYGEN SATURATION: 96 % | WEIGHT: 259.8 LBS | HEART RATE: 67 BPM | TEMPERATURE: 98.2 F

## 2024-07-29 DIAGNOSIS — N18.4 TYPE 1 DIABETES MELLITUS WITH STAGE 4 CHRONIC KIDNEY DISEASE (H): ICD-10-CM

## 2024-07-29 DIAGNOSIS — J20.9 ACUTE BRONCHITIS WITH SYMPTOMS > 10 DAYS: Primary | ICD-10-CM

## 2024-07-29 DIAGNOSIS — E10.22 TYPE 1 DIABETES MELLITUS WITH STAGE 4 CHRONIC KIDNEY DISEASE (H): ICD-10-CM

## 2024-07-29 DIAGNOSIS — R05.3 PERSISTENT COUGH: ICD-10-CM

## 2024-07-29 PROCEDURE — 71046 X-RAY EXAM CHEST 2 VIEWS: CPT

## 2024-07-29 PROCEDURE — 99214 OFFICE O/P EST MOD 30 MIN: CPT | Performed by: NURSE PRACTITIONER

## 2024-07-29 RX ORDER — CEFDINIR 300 MG/1
300 CAPSULE ORAL DAILY
Qty: 5 CAPSULE | Refills: 0 | Status: SHIPPED | OUTPATIENT
Start: 2024-07-29 | End: 2024-08-03

## 2024-07-29 RX ORDER — BENZONATATE 100 MG/1
100 CAPSULE ORAL 3 TIMES DAILY PRN
Qty: 21 CAPSULE | Refills: 0 | Status: SHIPPED | OUTPATIENT
Start: 2024-07-29

## 2024-07-29 ASSESSMENT — PAIN SCALES - GENERAL: PAINLEVEL: MILD PAIN (3)

## 2024-07-29 NOTE — PROGRESS NOTES
ASSESSMENT/PLAN:     I have reviewed the nursing notes.  I have reviewed the findings, diagnosis, plan and need for follow up with the patient.        1. Persistent cough  - XR Chest 2 Views    2. Acute bronchitis with symptoms > 10 days  - benzonatate (TESSALON) 100 MG capsule; Take 1 capsule (100 mg) by mouth 3 times daily as needed for cough  Dispense: 21 capsule; Refill: 0  - cefdinir (OMNICEF) 300 MG capsule; Take 1 capsule (300 mg) by mouth daily for 5 days  Dispense: 5 capsule; Refill: 0    CXR completed and personally reviewed, no appreciated infiltrate, radiologist over read:  No acute cardiopulmonary process.     Patient requests antibiotics due to severity and length of cough.    Discussed safe cough suppressant medication he can use with his CKD - Tessalon and Coricidin HBP  Symptomatic treatment - Encouraged fluids, salt water gargles, honey, elevation, humidifier, lozenges, tea, soup, smoothies, popsicles, topical vapor rub, rest, etc     Discussed warning signs/symptoms indicative of need to f/u  Follow up if symptoms persist or worsen or concerns    3. Type 1 diabetes mellitus with stage 4 chronic kidney disease (H)    Creatinine today of 3.04  GFR today of 23  Medication choices and dose adjustments completed based on CKD              I explained my diagnostic considerations and recommendations to the patient, who voiced understanding and agreement with the treatment plan. All questions were answered. We discussed potential side effects of any prescribed or recommended therapies, as well as expectations for response to treatments.    Vonnie Ortiz NP  [facility] AND [facility]      SUBJECTIVE:   Clemente Graham is a 59 year old male who presents to clinic today for the following health issues:  Cough    HPI  Patient accompanied today by his spouse.  Information obtained by patient.  Persisting cough for over 2 weeks without lessening.  Hard coughing fits.  Occasional able to cough up  small amount of phlegm.  Denies chest tightness or heaviness.  Some shortness of breath with hard coughing or exertion.  No fevers or chills.  No nasal drainage/congestion.  Throat irritation from coughing.  No headaches.  Normal appetite.  Diabetic with insulin pump.  Energy decreased.  Difficulty sleeping due to coughing.    Taking Dayquil without relief   Patient's spouse and child both with same illness and were treated with antibiotics with resolution.      Past Medical History:   Diagnosis Date    Carpal tunnel syndrome, bilateral     Diabetic foot ulcer (H)     Hyperlipidemia     Hypertension     Microalbuminuria     Neuropathy     Retinopathy     Trigger finger of both hands     Type 1 diabetes (H) 1982    Ulnar nerve entrapment at elbow, left     Urinary retention      Past Surgical History:   Procedure Laterality Date    AMPUTATION Right     Great toe    CARPAL TUNNEL RELEASE RT/LT Bilateral     CLOSED REDUCTION, PERCUTANEOUS PINNING HIP      age 14, pinned for dislocation    COLONOSCOPY      Due 2022. has had two, history of polyps. recommend 5 year follow-up    ROTATOR CUFF REPAIR RT/LT Bilateral      Social History     Tobacco Use    Smoking status: Never     Passive exposure: Never    Smokeless tobacco: Never    Tobacco comments:     no ecig   Substance Use Topics    Alcohol use: Yes     Comment: very seldom     Current Outpatient Medications   Medication Sig Dispense Refill    aspirin (ASA) 81 MG tablet Take 1 tablet by mouth daily      atorvastatin (LIPITOR) 40 MG tablet Take 1 tablet (40 mg) by mouth daily 90 tablet 4    blood glucose (NO BRAND SPECIFIED) lancets standard Dispense item covered by insurance. Test blood sugar 6 times daily. 100 each 11    blood glucose (NO BRAND SPECIFIED) test strip Dispense item covered by insurance. Test blood sugar 6 times daily. 100 strip 11    blood glucose monitoring (NO BRAND SPECIFIED) meter device kit Dispense option covered by insurance. Test blood sugar 6  times daily. 1 kit 11    Catheters [facility] 14F ref #[id number] - use to self-catheterize 4 times daily.      Continuous Blood Gluc Sensor (DEXCOM G6 SENSOR) MISC 1 each every 10 days Change every 10 days. 3 each 5    Continuous Blood Gluc Transmit (DEXCOM G6 TRANSMITTER) MISC 1 each every 3 months Change every 3 months. 1 each 1    darbepoetin dashawn (ARANESP) 40 MCG/0.4ML injection Inject 40 mcg Subcutaneous every 28 days      Elemental iron 65 mg Vitamin C 125 mg (VITRON C)  MG TABS tablet Take 1 tablet by mouth daily      famotidine (PEPCID) 20 MG tablet Take 1 tablet (20 mg) by mouth 2 times daily 180 tablet 3    furosemide (LASIX) 40 MG tablet 80 mg daily.  If you notice a > 5 lb weight gain associated with water swelling or short of breath, take 80 mg twice daily for 5 days then back to 80 mg daily (Patient taking differently: Take 40 mg by mouth 2 times daily 80 mg daily.  If you notice a > 5 lb weight gain associated with water swelling or short of breath, take 80 mg twice daily for 5 days then back to 80 mg daily) 360 tablet 3    glucagon 1 MG kit Inject 1 mg into the muscle as needed      insulin aspart (NOVOLOG VIAL) 100 UNITS/ML vial       insulin aspart (NOVOPEN ECHO) 100 UNIT/ML cartridge Use with insulin pump. Max 70 units/day. 60 mL 11    Insulin Aspart, w/Niacinamide, (FIASP) 100 UNIT/ML SOLN 300 Units by Fill Pump route every 3 days Use per insulin pump therapy. Max daily dose 100 units. 40 mL 11    lisinopril (ZESTRIL) 20 MG tablet Take 1 tablet (20 mg) by mouth daily 90 tablet 3    loratadine (CLARITIN) 10 MG tablet Take 10 mg by mouth daily      metolazone (ZAROXOLYN) 2.5 MG tablet Take 1 tablet (2.5 mg) by mouth every other day 45 tablet 3    metoprolol tartrate (LOPRESSOR) 25 MG tablet Take 1 tablet (25 mg) by mouth 2 times daily 180 tablet 3    Multiple Vitamin (MULTI VITAMIN W/D-3) TABS Take 1 tablet by mouth daily      order for DME Automatic electronic sphygmomanometer including  "machine, cuff, tubing and all associated supplies. Essential Hypertension, chronic. Lifelong. 1 each 11    polyethylene glycol (MIRALAX) 17 GM/Dose powder Take 17 g by mouth 2 times daily 510 g 0    Riboflavin (VITAMIN B-2 PO) Take 50 mg by mouth once a week      tamsulosin (FLOMAX) 0.4 MG capsule Take 1 capsule (0.4 mg) by mouth daily 90 capsule 4    vitamin D2 (ERGOCALCIFEROL) 07927 units (1250 mcg) capsule Take 1 capsule (50,000) by mouth once a week 12 capsule 4    bisacodyl (DULCOLAX) 5 MG EC tablet Use as directed for colonoscopy prep (Patient not taking: Reported on 7/29/2024) 2 tablet 0    polyethylene glycol (MIRALAX) 17 GM/Dose powder Mix with 255g with 64 oz of Gatorade (not red or purple) drink at a rate of 8oz every 10 min as directed for colonoscopy prep (Patient not taking: Reported on 7/29/2024) 510 g 0     No Known Allergies      Past medical history, past surgical history, current medications and allergies reviewed and accurate to the best of my knowledge.        OBJECTIVE:     BP (!) 146/77 (BP Location: Right arm, Patient Position: Sitting, Cuff Size: Adult Large)   Pulse 67   Temp 98.2  F (36.8  C) (Temporal)   Resp 18   Ht 1.93 m (6' 4\")   Wt 117.8 kg (259 lb 12.8 oz)   SpO2 96%   BMI 31.62 kg/m    Body mass index is 31.62 kg/m .        Physical Exam  General Appearance: Well appearing adult male, appropriate appearance for age. No acute distress  Orophayrnx:  voice clear.    Nose:  No noted drainage or congestion   Neck: supple without adenopathy  Respiratory: normal chest wall and respirations.  Normal effort.  Clear to auscultation bilaterally, no wheezing, crackles or rhonchi.  No increased work of breathing.  Occasional cough appreciated.  Cardiac: RRR with no murmurs  Musculoskeletal:  Equal movement of bilateral upper extremities.  Equal movement of bilateral lower extremities.  Normal gait.    Psychological: normal affect, alert, oriented, and pleasant.       Imaging:  Results " for orders placed or performed in visit on 07/29/24   XR Chest 2 Views     Status: None    Narrative    Exam:  XR CHEST 2 VIEWS    HISTORY: Persistent cough.    COMPARISON:  None.    FINDINGS:     The cardiomediastinal contours are normal.      No focal consolidation, effusion, or pneumothorax.      No acute osseous abnormality.       Impression    IMPRESSION:      No acute cardiopulmonary process.      DYLAN VALENTIN MD         SYSTEM ID:  [id number]

## 2024-07-29 NOTE — NURSING NOTE
"Chief Complaint   Patient presents with    Cough     X2weeks      Patient presents today with ongoing cough for at least 2 weeks. He said it is a dry cough, but wife said it sounds like it would be thick/ phlegmy. He cannot sleep at night as coughing keeps him awake, also complains of sore throat from coughing so hard. He took cough medicine at home but it hasn't helped...       Initial BP (!) 146/77 (BP Location: Right arm, Patient Position: Sitting, Cuff Size: Adult Large)   Pulse 67   Temp 98.2  F (36.8  C) (Temporal)   Resp 18   Ht 1.93 m (6' 4\")   Wt 117.8 kg (259 lb 12.8 oz)   SpO2 96%   BMI 31.62 kg/m   Estimated body mass index is 31.62 kg/m  as calculated from the following:    Height as of this encounter: 1.93 m (6' 4\").    Weight as of this encounter: 117.8 kg (259 lb 12.8 oz).     FOOD SECURITY SCREENING QUESTIONS:    The next two questions are to help us understand your food security.  If you are feeling you need any assistance in this area, we have resources available to support you today.    Hunger Vital Signs:  Within the past 12 months we worried whether our food would run out before we got money to buy more. Never  Within the past 12 months the food we bought just didn't last and we didn't have money to get more. Never      Kasia Monaco    "

## 2024-08-14 ENCOUNTER — ALLIED HEALTH/NURSE VISIT (OUTPATIENT)
Dept: EDUCATION SERVICES | Facility: OTHER | Age: 59
End: 2024-08-14
Payer: COMMERCIAL

## 2024-08-14 DIAGNOSIS — E10.59 TYPE 1 DIABETES MELLITUS WITH OTHER CIRCULATORY COMPLICATION (H): Primary | Chronic | ICD-10-CM

## 2024-08-14 PROCEDURE — G0108 DIAB MANAGE TRN  PER INDIV: HCPCS | Performed by: REGISTERED NURSE

## 2024-08-14 NOTE — PROGRESS NOTES
Diabetes Self-Management Education & Support    Presents for:      Type of Service: In Person Visit      ASSESSMENT:  Clemente Graham is an 59 year old male who presents for education regarding Insulin Pumps and Continuous Glucose Monitoring (CGM) for Type 1 diabetes mellitus.     BG range within the past two weeks:   60 - over 400 mg/dL.    Does patient experience hypoglycemia unawareness?  No      Current prerequisites for insurance coverage of CGM:  1.  Patient has diabetes:  YES  2.  Patient is utilizing an insulin pump or administers 1 or more injections of insulin per day:  YES.      Patient meets 2 of 2 required criteria for continued insurance coverage of CGM.    Patient currently using Tandem TSlim X2 insulin pump with [employer] and Dexcom G6 CGM.  He is interested in the new Closed Loop system, [employer] iLet insuiln pump.    Discussed how Closed Loop insulin pump system is different from his Hybrid Closed Loop system, Tandem.  He states he will be getting the Dexcom G7 CGM soon and is happy to hear iLet also uses G7 CGM system.  Discussed how pump and CGM works as well as benefits and contraindications regarding iLet and CGM.    Patient would like prescriptions sent to [id number] to start the insurance benefit investigation process.        Patient's most recent   Lab Results   Component Value Date    A1C 7.0 01/26/2024    A1C 10.3 02/18/2021     is not meeting goal of <7.0    Diabetes knowledge and skills assessment:   Patient is knowledgeable in diabetes management concepts related to: Being Active, Monitoring, Taking Medication, and Problem Solving    Continue education with the following diabetes management concepts: Reducing Risks    Based on learning assessment above, most appropriate setting for further diabetes education would be: Group class or Individual setting.      PLAN    Submit documentation for [employer] iLet insulin pump and follow up for pump training.    Topics to  "cover at upcoming visits: Reducing Risks and Insulin Pump Concepts Hands on practice with basic pump button use    Follow-up: TBD per patient schedule.     See Care Plan for co-developed, patient-state behavior change goals.  AVS provided for patient today.    Education Materials Provided:  [employer] iLet insulin pump brochure, Tandem Mobi brochure      SUBJECTIVE/OBJECTIVE:  Accompanied by: Self  Diabetes education in the past 24mo: Yes  Focus of Visit: Other  Diabetes type: Type 1  Disease course: Stable  How confident are you filling out medical forms by yourself:: Extremely  Cultural Influences/Ethnic Background:  Not  or       Diabetes Symptoms & Complications:  Diabetes Related Symptoms: Fatigue, Neuropathy, Slow healing wounds  Weight trend: Stable  Symptom course: Stable  Disease course: Stable       Patient Problem List and Family Medical History reviewed for relevant medical history, current medical status, and diabetes risk factors.    Vitals:  There were no vitals taken for this visit.  Estimated body mass index is 31.62 kg/m  as calculated from the following:    Height as of 7/29/24: 1.93 m (6' 4\").    Weight as of 7/29/24: 117.8 kg (259 lb 12.8 oz).   Last 3 BP:   BP Readings from Last 3 Encounters:   07/29/24 (!) 146/77   04/01/24 126/66   03/04/24 110/60       History   Smoking Status    Never   Smokeless Tobacco    Never       Labs:  Lab Results   Component Value Date    A1C 7.0 01/26/2024    A1C 10.3 02/18/2021     Lab Results   Component Value Date     04/01/2024    GLC 71 03/08/2022     04/08/2021     Lab Results   Component Value Date     01/26/2024    LDL 62 05/21/2020     HDL Cholesterol   Date Value Ref Range Status   05/21/2020 43 23 - 92 mg/dL Final     Direct Measure HDL   Date Value Ref Range Status   01/26/2024 43 >=40 mg/dL Final   ]  GFR Estimate   Date Value Ref Range Status   04/01/2024 31 (L) >60 mL/min/1.73m2 Final   04/08/2021 37 (L) >60 " "mL/min/[1.73_m2] Final     GFR Estimate If Black   Date Value Ref Range Status   04/08/2021 44 (L) >60 mL/min/[1.73_m2] Final     Lab Results   Component Value Date    CR 2.36 04/01/2024    CR 1.91 04/08/2021     No results found for: \"MICROALBUMIN\"    Healthy Eating:  Cultural/Jainism diet restrictions?: No  How many times a week on average do you eat food made away from home (restaurant/take-out)?: 1  Meals include: Lunch, Dinner  Beverages: Milk, Diet soda    Being Active:  Barrier to exercise: None    Monitoring:  Blood Glucose Meter: CGM  Times checking blood sugar at home (number): Other (see Comments)  Please elaborate:: Cgm      Taking Medications:  Diabetes Medication(s)       Diabetic Other       glucagon 1 MG kit Inject 1 mg into the muscle as needed       Insulin       Insulin Aspart, w/Niacinamide, (FIASP) 100 UNIT/ML SOLN 300 Units by Fill Pump route every 3 days Use per insulin pump therapy. Max daily dose 100 units.            Current Treatments: Insulin Pump      Reducing Risks:  Has dilated eye exam at least once a year?: Yes  Sees dentist every 6 months?: No  Feet checked by healthcare provider in the last year?: Yes    Healthy Coping:  Informal Support system:: Family  Patient Activation Measure Survey Score:       No data to display                  Care Plan and Education Provided:  Care Plan: Diabetes   Updates made by Bindu Spring RN since 8/14/2024 12:00 AM        Problem: Diabetes Self-Management Education Needed to Optimize Self-Care Behaviors         Goal: Healthy Eating - follow a healthy eating pattern for diabetes         Task: Provide education on portion control and consistency in amount, composition and timing of food intake Completed 8/14/2024   Responsible User: Bindu Spring RN        Goal: Taking Medication - patient is consistently taking medications as directed         Task: Provide education on frequency and refill details of medications Completed 8/14/2024 "   Responsible User: Bindu Spring RN Suzette Childs, RN, BSN, [facility]  8/14/2024 12:19 PM     Time Spent: 60 minutes  Encounter Type: Individual    Any diabetes medication dose changes were made via the CDE Protocol per the patient's primary care provider. A copy of this encounter was shared with the provider.

## 2024-08-19 ENCOUNTER — MEDICAL CORRESPONDENCE (OUTPATIENT)
Dept: HEALTH INFORMATION MANAGEMENT | Facility: OTHER | Age: 59
End: 2024-08-19
Payer: COMMERCIAL

## 2024-08-21 ENCOUNTER — TELEPHONE (OUTPATIENT)
Dept: PEDIATRICS | Facility: OTHER | Age: 59
End: 2024-08-21
Payer: COMMERCIAL

## 2024-08-21 NOTE — TELEPHONE ENCOUNTER
[employer] received paperwork for the patients Ilet insulin pump. The paperwork is signed but is missing some additions. Jerry said he could get the information over the phone to complete the paperwork. Aware [facility] is out of the clinic today 8/21/24.        Hortensia Michelle on 8/21/2024 at 11:57 AM

## 2024-08-21 NOTE — TELEPHONE ENCOUNTER
Called and spoke with Jerry at [employer]. He informed me the following information needs to be added to the already signed form. Jerry will be faxing the form back to [facility] today.     Malfunctions: tandem pump does not always notify him when he needs to change his sets    Alarming and alerts are harder to hear.     Out of warranty date: 8/1/24       Aleshia Slade RN on 8/21/2024 at 12:07 PM

## 2024-08-22 ENCOUNTER — MEDICAL CORRESPONDENCE (OUTPATIENT)
Dept: HEALTH INFORMATION MANAGEMENT | Facility: OTHER | Age: 59
End: 2024-08-22
Payer: COMMERCIAL

## 2024-08-29 DIAGNOSIS — N18.31 STAGE 3A CHRONIC KIDNEY DISEASE (H): ICD-10-CM

## 2024-08-30 RX ORDER — ERGOCALCIFEROL 1.25 MG/1
CAPSULE, LIQUID FILLED ORAL
Qty: 12 CAPSULE | Refills: 2 | Status: SHIPPED | OUTPATIENT
Start: 2024-08-30

## 2024-08-30 NOTE — TELEPHONE ENCOUNTER
sent Rx request for the following:      Requested Prescriptions   Pending Prescriptions Disp Refills    vitamin D2 (ERGOCALCIFEROL) 77678 units (1250 mcg) capsule [Pharmacy Med Name: Vitamin D (Ergocalciferol) 1.25 MG (05711 UT) Oral Capsule] 12 capsule 2     Sig: Take 1 capsule (50,000) by mouth once a week       There is no refill protocol information for this order          Last Prescription Date:   1/29/24  Last Fill Qty/Refills:         12, R-4    Last Office Visit:              4/1/24   Future Office visit:      no future office visit on file at [facility]    Unable to complete prescription refill per RN Medication Refill Policy. No protocol for this medication.   Yudelka Healy RN on 8/30/2024 at 1:36 PM

## 2024-09-06 ENCOUNTER — HOSPITAL ENCOUNTER (OUTPATIENT)
Dept: MRI IMAGING | Facility: CLINIC | Age: 59
Discharge: HOME | End: 2024-09-06
Attending: INTERNAL MEDICINE | Admitting: INTERNAL MEDICINE
Payer: COMMERCIAL

## 2024-09-06 VITALS
SYSTOLIC BLOOD PRESSURE: 150 MMHG | OXYGEN SATURATION: 100 % | HEART RATE: 78 BPM | DIASTOLIC BLOOD PRESSURE: 83 MMHG | RESPIRATION RATE: 16 BRPM

## 2024-09-06 DIAGNOSIS — R00.2 PALPITATIONS: ICD-10-CM

## 2024-09-06 DIAGNOSIS — I50.33 ACUTE ON CHRONIC HEART FAILURE WITH PRESERVED EJECTION FRACTION (H): ICD-10-CM

## 2024-09-06 DIAGNOSIS — R42 DIZZINESS: ICD-10-CM

## 2024-09-06 PROCEDURE — 255N000002 HC RX 255 OP 636: Performed by: INTERNAL MEDICINE

## 2024-09-06 PROCEDURE — 75563 CARD MRI W/STRESS IMG & DYE: CPT | Mod: 26 | Performed by: INTERNAL MEDICINE

## 2024-09-06 PROCEDURE — 999N000054 HC STATISTIC EKG NON-CHARGEABLE

## 2024-09-06 PROCEDURE — 93016 CV STRESS TEST SUPVJ ONLY: CPT | Performed by: INTERNAL MEDICINE

## 2024-09-06 PROCEDURE — 93005 ELECTROCARDIOGRAM TRACING: CPT

## 2024-09-06 PROCEDURE — 93018 CV STRESS TEST I&R ONLY: CPT | Performed by: INTERNAL MEDICINE

## 2024-09-06 PROCEDURE — 75563 CARD MRI W/STRESS IMG & DYE: CPT

## 2024-09-06 PROCEDURE — 250N000011 HC RX IP 250 OP 636: Performed by: INTERNAL MEDICINE

## 2024-09-06 PROCEDURE — A9585 GADOBUTROL INJECTION: HCPCS | Performed by: INTERNAL MEDICINE

## 2024-09-06 RX ORDER — LIDOCAINE 40 MG/G
CREAM TOPICAL
Status: DISCONTINUED | OUTPATIENT
Start: 2024-09-06 | End: 2024-09-07 | Stop reason: HOSPADM

## 2024-09-06 RX ORDER — SODIUM CHLORIDE 9 MG/ML
INJECTION, SOLUTION INTRAVENOUS CONTINUOUS
Status: SHIPPED | OUTPATIENT
Start: 2024-09-06 | End: 2024-09-06

## 2024-09-06 RX ORDER — CAFFEINE 200 MG
200 TABLET ORAL
Status: SHIPPED | OUTPATIENT
Start: 2024-09-06 | End: 2024-09-06

## 2024-09-06 RX ORDER — DIAZEPAM 5 MG
5 TABLET ORAL EVERY 30 MIN PRN
Status: DISCONTINUED | OUTPATIENT
Start: 2024-09-06 | End: 2024-09-07 | Stop reason: HOSPADM

## 2024-09-06 RX ORDER — AMINOPHYLLINE 25 MG/ML
50-100 INJECTION, SOLUTION INTRAVENOUS
Status: COMPLETED | OUTPATIENT
Start: 2024-09-06 | End: 2024-09-06

## 2024-09-06 RX ORDER — REGADENOSON 0.08 MG/ML
0.4 INJECTION, SOLUTION INTRAVENOUS ONCE
Status: COMPLETED | OUTPATIENT
Start: 2024-09-06 | End: 2024-09-06

## 2024-09-06 RX ORDER — CAFFEINE CITRATE 20 MG/ML
60 SOLUTION INTRAVENOUS
Status: SHIPPED | OUTPATIENT
Start: 2024-09-06 | End: 2024-09-06

## 2024-09-06 RX ORDER — ALBUTEROL SULFATE 90 UG/1
2 AEROSOL, METERED RESPIRATORY (INHALATION) EVERY 5 MIN PRN
Status: DISCONTINUED | OUTPATIENT
Start: 2024-09-06 | End: 2024-09-07 | Stop reason: HOSPADM

## 2024-09-06 RX ORDER — LORAZEPAM 0.5 MG/1
0.5 TABLET ORAL EVERY 10 MIN PRN
Status: DISCONTINUED | OUTPATIENT
Start: 2024-09-06 | End: 2024-09-07 | Stop reason: HOSPADM

## 2024-09-06 RX ORDER — GADOBUTROL 604.72 MG/ML
30 INJECTION INTRAVENOUS ONCE
Status: COMPLETED | OUTPATIENT
Start: 2024-09-06 | End: 2024-09-06

## 2024-09-06 RX ADMIN — REGADENOSON 0.4 MG: 0.4 INJECTION INTRAVENOUS at 14:22

## 2024-09-06 RX ADMIN — AMINOPHYLLINE 100 MG: 25 INJECTION, SOLUTION INTRAVENOUS at 14:25

## 2024-09-06 RX ADMIN — GADOBUTROL 30 ML: 604.72 INJECTION INTRAVENOUS at 14:56

## 2024-09-08 LAB
ATRIAL RATE - MUSE: 76 BPM
ATRIAL RATE - MUSE: 81 BPM
DIASTOLIC BLOOD PRESSURE - MUSE: NORMAL MMHG
DIASTOLIC BLOOD PRESSURE - MUSE: NORMAL MMHG
INTERPRETATION ECG - MUSE: NORMAL
INTERPRETATION ECG - MUSE: NORMAL
P AXIS - MUSE: 41 DEGREES
P AXIS - MUSE: 58 DEGREES
PR INTERVAL - MUSE: 158 MS
PR INTERVAL - MUSE: 164 MS
QRS DURATION - MUSE: 152 MS
QRS DURATION - MUSE: 154 MS
QT - MUSE: 442 MS
QT - MUSE: 444 MS
QTC - MUSE: 497 MS
QTC - MUSE: 515 MS
R AXIS - MUSE: 232 DEGREES
R AXIS - MUSE: 246 DEGREES
SYSTOLIC BLOOD PRESSURE - MUSE: NORMAL MMHG
SYSTOLIC BLOOD PRESSURE - MUSE: NORMAL MMHG
T AXIS - MUSE: 25 DEGREES
T AXIS - MUSE: 55 DEGREES
VENTRICULAR RATE- MUSE: 76 BPM
VENTRICULAR RATE- MUSE: 81 BPM

## 2024-09-21 ENCOUNTER — HEALTH MAINTENANCE LETTER (OUTPATIENT)
Age: 59
End: 2024-09-21

## 2024-10-02 DIAGNOSIS — I50.32 CHRONIC HEART FAILURE WITH PRESERVED EJECTION FRACTION (H): ICD-10-CM

## 2024-10-02 DIAGNOSIS — I10 BENIGN ESSENTIAL HYPERTENSION: ICD-10-CM

## 2024-10-02 DIAGNOSIS — N18.4 CHRONIC KIDNEY DISEASE, STAGE IV (SEVERE) (H): ICD-10-CM

## 2024-10-07 RX ORDER — METOPROLOL TARTRATE 25 MG/1
25 TABLET, FILM COATED ORAL 2 TIMES DAILY
Qty: 180 TABLET | Refills: 3 | Status: SHIPPED | OUTPATIENT
Start: 2024-10-07

## 2024-10-07 NOTE — TELEPHONE ENCOUNTER
Carlos BROWN sent Rx request for the following:      Requested Prescriptions   Pending Prescriptions Disp Refills    metoprolol tartrate (LOPRESSOR) 25 MG tablet [Pharmacy Med Name: Metoprolol Tartrate 25 MG Oral Tablet (Lopressor)] 180 tablet 3     Sig: Take 1 Tablet by mouth two times a day.       Beta-Block[employer] Protocol Failed - 10/2/2024  5:40 PM        Failed - Blood pressure under 140/90 in past 12 months     BP Readings from Last 3 Encounters:   09/06/24 (!) 150/83   07/29/24 (!) 146/77   04/01/24 126/66       No data recorded           Last Prescription Date:   10/11/23  Last Fill Qty/Refills:         180, R-3    Last Office Visit:              2/26/24   Future Office visit:            none      Routing refill request to provider for review/approval because:  Drug not on the [facility] refill protocol     Brittany Hart RN on 10/7/2024 at 3:25 PM

## 2024-10-16 ENCOUNTER — ALLIED HEALTH/NURSE VISIT (OUTPATIENT)
Dept: EDUCATION SERVICES | Facility: OTHER | Age: 59
End: 2024-10-16
Payer: COMMERCIAL

## 2024-10-16 DIAGNOSIS — N18.4 CONTROLLED TYPE 1 DIABETES MELLITUS WITH STAGE 4 CHRONIC KIDNEY DISEASE (H): Primary | ICD-10-CM

## 2024-10-16 DIAGNOSIS — E10.22 CONTROLLED TYPE 1 DIABETES MELLITUS WITH STAGE 4 CHRONIC KIDNEY DISEASE (H): Primary | ICD-10-CM

## 2024-10-16 PROCEDURE — G0108 DIAB MANAGE TRN  PER INDIV: HCPCS | Performed by: REGISTERED NURSE

## 2024-10-16 RX ORDER — ACYCLOVIR 400 MG/1
1 TABLET ORAL
COMMUNITY

## 2024-10-16 NOTE — PROGRESS NOTES
"Diabetes Self-Management Education & Support    Presents for: Insulin Pump Start      ASSESSMENT:  Presents for:       Type of Service: In Person Visit        Insulin Pump Training:   [employer] iLet Insulin Delivery System     BG at beginning of appointment: 180 mg/dL  BG at end of appointment: 159 mg/dL  Last basal insulin dose: n/a, transitioning from Tandem to iLet insulin pump.  Last bolus insulin dose: 10/15/2024     Pump overview:  touch screen and general navigation, rechargeable battery     Home screen/ menu:  Status, battery life, CGM icons, units remaining, time/date,  Options - settings, history, alerts   Basal/Bolus - automated, mealtime announcements \"usual\" or \"larger\" at pump start, later addition of \"smaller.\"      Personal profile:  Name and current weight  Therapy:  120 mg/dL. Higher (130 mg/dL), Usual (120 mg/dL) or Lower (110 mg/dL).     Dexcom G7 CGM:  Entering sensor pairing code  Urgent Low alert: Fixed at 55 mg/dl  Urgent Low soon  High Glucose 300 mg/dL  Low Glucose 75 mg/dL.        Infusion set:  Loading cartridge- minimum and maximum amounts, site selection and prep, tubing and canula fills, change set every 3 days     Safety:  troubleshooting, low reservoir, alerts and alarms, importance of back-up plan, hypoglycemia, sick day management, hyperglycemia and DKA prevention       [employer] fernando on phone for data transfer and software updates:  Yes      Additional topics: 24/7 Customer Care helpline [phone], removal for MRI, backup plan, when and how to order pump supplies (Distributor: [employer]), when to call a healthcare provider.      Education materials provided to patient:  [employer] Patient Success Worksheet, Ketone Action Plan       ASSESSMENT:     Diabetes Education review given for treatment of hypoglycemia and hyperglycemia and importance of ketone testing if BG greater than 250 mg/dL, (two readings in a row).  Reviewed CHO counting and exercise.     Reviewed pump therapy " "concepts:  Automated Basal and bolus therapy, target glucose, and meal announcements       Patient followed steps, filled cartridge with insulin and pressed GO BIONIC!        Encouraged patient to follow up with [facility].  Patient to schedule follow up pump visit in the next month.  Encouraged patient to call if any questions or concerns.          Insulin pump was approved by Dr. Pro on 8/19/2024.      Pt verbalized understanding of concepts discussed, and recommendations provided today. Patient was able to start insulin pump without difficulty. Yes      PLAN     Begin using [employer] pump, announcing meals before consuming and trying to consume \"usual\" meals over the next four days while pump learns insulin sensitivity.     Topics to cover at upcoming visits: Problem Solving and Insulin Pump Concepts Problem solving with insulin pump therapy (BG monitoring; hypoglycemia signs/symptoms, treatment (glucagon) and prevention; hyperglycemia signs/symptoms, treatment and prevention; ketones, DKA signs/symptoms and prevention)     Follow-up: TBD per patient schedule in one month.      Pt verbalized understanding of concepts discussed and recommendations provided today. Patient was able to start insulin pump without difficulty. Yes         See Care Plan for co-developed, patient-state behavior change goals.  AVS provided for patient today.      SUBJECTIVE/OBJECTIVE:  Presents for: Insulin Pump Start  Accompanied by: Self  Diabetes education in the past 24mo: Yes  Focus of Visit: Other  Type of Pump visit: Pump Start  Diabetes type: Type 1  Disease course: Stable  How confident are you filling out medical forms by yourself:: Extremely  Cultural Influences/Ethnic Background:  Not  or       Diabetes Symptoms & Complications:  Diabetes Related Symptoms: Fatigue, Neuropathy, Slow healing wounds  Weight trend: Stable  Symptom course: Stable  Disease course: Stable       Patient Problem List and Family Medical " "History reviewed for relevant medical history, current medical status, and diabetes risk factors.    Vitals:  There were no vitals taken for this visit.  Estimated body mass index is 31.62 kg/m  as calculated from the following:    Height as of 7/29/24: 1.93 m (6' 4\").    Weight as of 7/29/24: 117.8 kg (259 lb 12.8 oz).   Last 3 BP:   BP Readings from Last 3 Encounters:   09/06/24 (!) 150/83   07/29/24 (!) 146/77   04/01/24 126/66       History   Smoking Status    Never   Smokeless Tobacco    Never       Labs:  Lab Results   Component Value Date    A1C 7.0 01/26/2024    A1C 10.3 02/18/2021     Lab Results   Component Value Date     04/01/2024    GLC 71 03/08/2022     04/08/2021     Lab Results   Component Value Date     01/26/2024    LDL 62 05/21/2020     HDL Cholesterol   Date Value Ref Range Status   05/21/2020 43 23 - 92 mg/dL Final     Direct Measure HDL   Date Value Ref Range Status   01/26/2024 43 >=40 mg/dL Final   ]  GFR Estimate   Date Value Ref Range Status   04/01/2024 31 (L) >60 mL/min/1.73m2 Final   04/08/2021 37 (L) >60 mL/min/[1.73_m2] Final     GFR Estimate If Black   Date Value Ref Range Status   04/08/2021 44 (L) >60 mL/min/[1.73_m2] Final     Lab Results   Component Value Date    CR 2.36 04/01/2024    CR 1.91 04/08/2021     No results found for: \"MICROALBUMIN\"        Taking Medications:  Diabetes Medication(s)       Diabetic Other       glucagon 1 MG kit Inject 1 mg into the muscle as needed       Insulin       Insulin Aspart, w/Niacinamide, (FIASP) 100 UNIT/ML SOLN 300 Units by Fill Pump route every 3 days Use per insulin pump therapy. Max daily dose 100 units.            Healthy Coping:     Patient Activation Measure Survey Score:       No data to display                  Care Plan and Education Provided:  Care Plan: Diabetes   Updates made by Bindu Spring RN since 10/16/2024 12:00 AM        Problem: Diabetes Self-Management Education Needed to Optimize Self-Care " Behaviors         Goal: Taking Medication - patient is consistently taking medications as directed         Task: Discuss barriers to medication adherence with patient and provide management technique ideas as appropriate Completed 10/16/2024   Responsible User: Bindu Spring RN Suzette Childs, RN, BSN, [facility]  10/16/2024 1:49 PM     Time Spent: 120 minutes  Encounter Type: Individual    Any diabetes medication dose changes were made via the CDE Protocol per the patient's primary care provider. A copy of this encounter was shared with the provider.

## 2024-10-16 NOTE — PATIENT INSTRUCTIONS
"Diabetes Goals and Reminders    Your A1c test should be done every 3 months.  Your goal is less than 7%.   Your last result is:  Lab Results   Component Value Date    A1C 7.0 01/26/2024    A1C 10.3 02/18/2021       Your LDL cholesterol test should be done at least once a year.    Your last result is:  LDL Cholesterol Calculated   Date Value Ref Range Status   01/26/2024 108 (H) <=100 mg/dL Final   05/21/2020 62 <100 mg/dL Final     Comment:     Desirable:       <100 mg/dl       Have blood pressure and weight checked every three months.  Your blood pressure goal is 140/90 or less.  Your last blood pressure reading was:    BP Readings from Last 1 Encounters:   09/06/24 (!) 150/83       Use your CGM to check sensor glucose readings a minimum of 4 times per day.  Your home glucose target ranges are:  Before meals:    2 hours after a meal:  Less than 180  Bedtime:  100-140 mg/dL      Avoid all tobacco.  Follow your healthy diet and exercise plan.  See the eye doctor every year.  See the dentist every six months.  Have kidney function tested yearly.    Take all medicine as prescribed.  Please let me know if you are having symptoms you don t expect or if you wish to stop any medicine.    Current Pump settings   Pump Type:  B[employer] iLet insulin pump  Insulin Type: Fiasp  Patient weight:  260 lbs  Target Blood Glucose:   Usual Therapy at 120 mg/dL.      Follow Up Plan  Your future lab plan is:  HgA1c in at anytime.    If you need your cholesterol checked at your next appointment, you should fast 8 to 10 hours before your appointment.  Do not eat or drink anything besides water.  Drink plenty of water and take all your usual medicine.    SUMMARY FOR TODAY'S VISIT    1.  Recommended pump adjustments:    Begin using your new [employer] iLet insulin pump which utilizes Dexcom G7 CGM.      Please begin to ANNOUNCE your meals \"Usual for me\" a minimum of every 4 hours, three times daily, for the first four days.  " "    Consume low to no-carb snacks during these first four days to help iLet \"learn you\" when you use meal announcement \"Usual for me.\"      Please call iLet technical support with any questions, available 24/7, at [phone].      Please call Diabetes Education tomorrow for a quick follow up and if you have any questions or concerns at [phone].     Please follow up for insulin pump management in one month and as needed.           Bindu Spring RN, BSN, [facility], CPT  10/16/2024 12:25 PM         "

## 2024-10-17 ENCOUNTER — TELEPHONE (OUTPATIENT)
Dept: EDUCATION SERVICES | Facility: OTHER | Age: 59
End: 2024-10-17
Payer: COMMERCIAL

## 2024-10-17 NOTE — TELEPHONE ENCOUNTER
Patient states he is liking his new [employer] iLet insulin pump.  He notes glucose range has been 75 - 190 since the start yesterday.  He has 24 hr contact information for [employer] and [facility] if he has any questions or concerns.    Bindu Spring RN, BSN, [facility]  10/17/2024 5:18 PM

## 2025-01-12 ENCOUNTER — HEALTH MAINTENANCE LETTER (OUTPATIENT)
Age: 60
End: 2025-01-12

## 2025-02-11 ENCOUNTER — TRANSFERRED RECORDS (OUTPATIENT)
Dept: FAMILY MEDICINE | Facility: OTHER | Age: 60
End: 2025-02-11
Payer: COMMERCIAL

## 2025-02-11 VITALS — DIASTOLIC BLOOD PRESSURE: 60 MMHG | SYSTOLIC BLOOD PRESSURE: 136 MMHG

## 2025-02-11 NOTE — PROGRESS NOTES
Patient Quality Outreach    Patient is due for the following:   Hypertension -  BP check    Action(s) Taken:   No follow up needed at this time.    Type of outreach:    Chart review performed, no outreach needed. Blood pressure taken at last office visit at [facility].    Questions for provider review:    None           Yola Winters RN

## 2025-02-21 ENCOUNTER — TRANSFERRED RECORDS (OUTPATIENT)
Dept: HEALTH INFORMATION MANAGEMENT | Facility: OTHER | Age: 60
End: 2025-02-21
Payer: COMMERCIAL

## 2025-02-21 LAB — RETINOPATHY: POSITIVE

## 2025-02-22 DIAGNOSIS — N40.0 BENIGN PROSTATIC HYPERPLASIA, UNSPECIFIED WHETHER LOWER URINARY TRACT SYMPTOMS PRESENT: ICD-10-CM

## 2025-02-22 DIAGNOSIS — E78.2 MIXED HYPERLIPIDEMIA: Chronic | ICD-10-CM

## 2025-02-27 RX ORDER — ATORVASTATIN CALCIUM 40 MG/1
40 TABLET, FILM COATED ORAL DAILY
Qty: 90 TABLET | Refills: 0 | Status: SHIPPED | OUTPATIENT
Start: 2025-02-27

## 2025-02-27 RX ORDER — TAMSULOSIN HYDROCHLORIDE 0.4 MG/1
0.4 CAPSULE ORAL DAILY
Qty: 90 CAPSULE | Refills: 0 | Status: SHIPPED | OUTPATIENT
Start: 2025-02-27

## 2025-02-27 NOTE — TELEPHONE ENCOUNTER
[facility] the following:      Requested Prescriptions   Pending Prescriptions Disp Refills    tamsulosin (FLOMAX) 0.4 MG capsule [Pharmacy Med Name: Tamsulosin HCl 0.4 MG Oral Capsule (Flomax)] 90 capsule 4     Sig: Take 1 capsule (0.4 mg) by mouth daily     Last Prescription Date:   1/26/2024  Last Fill Qty/Refills:         90, R-4         atorvastatin (LIPITOR) 40 MG tablet [Pharmacy Med Name: Atorvastatin Calcium 40 MG Oral Tablet (Lipitor)] 90 tablet 4     Sig: Take 1 tablet (40 mg) by mouth daily       Antihyperlipidemic agents Failed - 2/27/2025 10:00 AM        Failed - LDL on file in the past 12 months        Last Prescription Date:   1/26/2024  Last Fill Qty/Refills:         90, R-4    Last Office Visit:              1/26/2024   Future Office visit:            none     Pt due for annual exam. Routing to provider for refill consideration. Routing to Unit scheduling pool, to assist Pt in scheduling appointment. Ramez Rubio RN on 2/27/2025 at 10:05 AM

## 2025-03-12 ENCOUNTER — TELEPHONE (OUTPATIENT)
Dept: EDUCATION SERVICES | Facility: OTHER | Age: 60
End: 2025-03-12
Payer: COMMERCIAL

## 2025-03-12 NOTE — TELEPHONE ENCOUNTER
Patient shares he is changing infusion sets every 2 days and is out of infusion sets.  Phoned [facility] and order request sent to change infusion sets from every 3 days to every 2 days.    Form brought to PCP IN-Basket for review and approval.  Form to be faxed back to [facility], ASAP.    Patient notified.    Bindu Spring RN, BSN, [facility]  3/12/2025 4:45 PM

## 2025-03-13 ENCOUNTER — MEDICAL CORRESPONDENCE (OUTPATIENT)
Dept: HEALTH INFORMATION MANAGEMENT | Facility: OTHER | Age: 60
End: 2025-03-13
Payer: COMMERCIAL

## 2025-03-13 NOTE — TELEPHONE ENCOUNTER
Noted.  Thank you.    Patient notified.    Bindu Spring RN, BSN, [facility]  3/13/2025 3:41 PM

## 2025-03-15 SDOH — HEALTH STABILITY: PHYSICAL HEALTH: ON AVERAGE, HOW MANY DAYS PER WEEK DO YOU ENGAGE IN MODERATE TO STRENUOUS EXERCISE (LIKE A BRISK WALK)?: 2 DAYS

## 2025-03-15 SDOH — HEALTH STABILITY: PHYSICAL HEALTH: ON AVERAGE, HOW MANY MINUTES DO YOU ENGAGE IN EXERCISE AT THIS LEVEL?: 20 MIN

## 2025-03-15 ASSESSMENT — SOCIAL DETERMINANTS OF HEALTH (SDOH): HOW OFTEN DO YOU GET TOGETHER WITH FRIENDS OR RELATIVES?: PATIENT DECLINED

## 2025-03-19 ENCOUNTER — OFFICE VISIT (OUTPATIENT)
Dept: PEDIATRICS | Facility: OTHER | Age: 60
End: 2025-03-19
Attending: INTERNAL MEDICINE
Payer: COMMERCIAL

## 2025-03-19 VITALS
WEIGHT: 273.4 LBS | RESPIRATION RATE: 16 BRPM | OXYGEN SATURATION: 98 % | HEART RATE: 67 BPM | TEMPERATURE: 97.6 F | HEIGHT: 77 IN | DIASTOLIC BLOOD PRESSURE: 64 MMHG | BODY MASS INDEX: 32.28 KG/M2 | SYSTOLIC BLOOD PRESSURE: 122 MMHG

## 2025-03-19 DIAGNOSIS — Z96.41 INSULIN PUMP IN PLACE: Chronic | ICD-10-CM

## 2025-03-19 DIAGNOSIS — I50.32 CHRONIC HEART FAILURE WITH PRESERVED EJECTION FRACTION (H): ICD-10-CM

## 2025-03-19 DIAGNOSIS — S98.111A AMPUTATION OF RIGHT GREAT TOE: ICD-10-CM

## 2025-03-19 DIAGNOSIS — E44.0 MODERATE PROTEIN-CALORIE MALNUTRITION: ICD-10-CM

## 2025-03-19 DIAGNOSIS — E10.22 TYPE 1 DIABETES MELLITUS WITH STAGE 4 CHRONIC KIDNEY DISEASE (H): ICD-10-CM

## 2025-03-19 DIAGNOSIS — I10 BENIGN ESSENTIAL HYPERTENSION: ICD-10-CM

## 2025-03-19 DIAGNOSIS — N18.4 CONTROLLED TYPE 1 DIABETES MELLITUS WITH STAGE 4 CHRONIC KIDNEY DISEASE (H): ICD-10-CM

## 2025-03-19 DIAGNOSIS — E78.2 MIXED HYPERLIPIDEMIA: Chronic | ICD-10-CM

## 2025-03-19 DIAGNOSIS — N04.21 PRIMARY MEMBRANOUS NEPHROPATHY WITH NEPHROTIC SYNDROME: ICD-10-CM

## 2025-03-19 DIAGNOSIS — N18.4 TYPE 1 DIABETES MELLITUS WITH STAGE 4 CHRONIC KIDNEY DISEASE (H): ICD-10-CM

## 2025-03-19 DIAGNOSIS — Z12.5 SCREENING FOR PROSTATE CANCER: ICD-10-CM

## 2025-03-19 DIAGNOSIS — E10.59 TYPE 1 DIABETES MELLITUS WITH OTHER CIRCULATORY COMPLICATION (H): Chronic | ICD-10-CM

## 2025-03-19 DIAGNOSIS — Z00.00 ROUTINE GENERAL MEDICAL EXAMINATION AT A HEALTH CARE FACILITY: Primary | ICD-10-CM

## 2025-03-19 DIAGNOSIS — Z89.422 H/O AMPUTATION OF LESSER TOE, LEFT: ICD-10-CM

## 2025-03-19 DIAGNOSIS — N40.0 BENIGN PROSTATIC HYPERPLASIA, UNSPECIFIED WHETHER LOWER URINARY TRACT SYMPTOMS PRESENT: ICD-10-CM

## 2025-03-19 DIAGNOSIS — E10.22 CONTROLLED TYPE 1 DIABETES MELLITUS WITH STAGE 4 CHRONIC KIDNEY DISEASE (H): ICD-10-CM

## 2025-03-19 PROBLEM — R33.9 URINARY RETENTION: Status: RESOLVED | Noted: 2019-11-18 | Resolved: 2025-03-19

## 2025-03-19 LAB
ALBUMIN SERPL BCG-MCNC: 4 G/DL (ref 3.5–5.2)
ALT SERPL W P-5'-P-CCNC: 26 U/L (ref 0–70)
ANION GAP SERPL CALCULATED.3IONS-SCNC: 11 MMOL/L (ref 7–15)
BUN SERPL-MCNC: 27.8 MG/DL (ref 8–23)
CALCIUM SERPL-MCNC: 9.1 MG/DL (ref 8.8–10.4)
CHLORIDE SERPL-SCNC: 103 MMOL/L (ref 98–107)
CHOLEST SERPL-MCNC: 126 MG/DL
CREAT SERPL-MCNC: 1.98 MG/DL (ref 0.67–1.17)
EGFRCR SERPLBLD CKD-EPI 2021: 38 ML/MIN/1.73M2
ERYTHROCYTE [DISTWIDTH] IN BLOOD BY AUTOMATED COUNT: 12.6 % (ref 10–15)
EST. AVERAGE GLUCOSE BLD GHB EST-MCNC: 131 MG/DL
FASTING STATUS PATIENT QL REPORTED: NO
FASTING STATUS PATIENT QL REPORTED: NO
GLUCOSE SERPL-MCNC: 69 MG/DL (ref 70–99)
HBA1C MFR BLD: 6.2 %
HCO3 SERPL-SCNC: 25 MMOL/L (ref 22–29)
HCT VFR BLD AUTO: 36.9 % (ref 40–53)
HDLC SERPL-MCNC: 42 MG/DL
HGB BLD-MCNC: 12.4 G/DL (ref 13.3–17.7)
LDLC SERPL CALC-MCNC: 71 MG/DL
MCH RBC QN AUTO: 30.6 PG (ref 26.5–33)
MCHC RBC AUTO-ENTMCNC: 33.6 G/DL (ref 31.5–36.5)
MCV RBC AUTO: 91 FL (ref 78–100)
NONHDLC SERPL-MCNC: 84 MG/DL
PLATELET # BLD AUTO: 281 10E3/UL (ref 150–450)
POTASSIUM SERPL-SCNC: 4.3 MMOL/L (ref 3.4–5.3)
PREALB SERPL-MCNC: 18.8 MG/DL (ref 20–40)
PSA SERPL DL<=0.01 NG/ML-MCNC: 1.9 NG/ML (ref 0–4.5)
RBC # BLD AUTO: 4.05 10E6/UL (ref 4.4–5.9)
SODIUM SERPL-SCNC: 139 MMOL/L (ref 135–145)
TRIGL SERPL-MCNC: 67 MG/DL
WBC # BLD AUTO: 6.3 10E3/UL (ref 4–11)

## 2025-03-19 RX ORDER — TAMSULOSIN HYDROCHLORIDE 0.4 MG/1
0.4 CAPSULE ORAL DAILY
Qty: 90 CAPSULE | Refills: 4 | Status: SHIPPED | OUTPATIENT
Start: 2025-03-19

## 2025-03-19 RX ORDER — ATORVASTATIN CALCIUM 40 MG/1
40 TABLET, FILM COATED ORAL DAILY
Qty: 90 TABLET | Refills: 4 | Status: SHIPPED | OUTPATIENT
Start: 2025-03-19

## 2025-03-19 RX ORDER — LISINOPRIL 20 MG/1
20 TABLET ORAL DAILY
Qty: 90 TABLET | Refills: 4 | Status: SHIPPED | OUTPATIENT
Start: 2025-03-19

## 2025-03-19 RX ORDER — INSULIN ASPART INJECTION 100 [IU]/ML
3 INJECTION, SOLUTION SUBCUTANEOUS
Qty: 50 ML | Refills: 11 | Status: SHIPPED | OUTPATIENT
Start: 2025-03-19

## 2025-03-19 RX ORDER — FUROSEMIDE 40 MG/1
TABLET ORAL
Qty: 360 TABLET | Refills: 4 | Status: SHIPPED | OUTPATIENT
Start: 2025-03-19

## 2025-03-19 ASSESSMENT — PAIN SCALES - GENERAL: PAINLEVEL_OUTOF10: NO PAIN (0)

## 2025-03-19 NOTE — PROGRESS NOTES
Preventive Care Visit  [facility] [place]  Tello Pro MD, Internal Medicine  Mar 19, 2025        ICD-10-CM    1. Routine general medical examination at a health care facility  Z00.00       2. Mixed hyperlipidemia  E78.2 atorvastatin (LIPITOR) 40 MG tablet     ALT     ALT      3. Chronic heart failure with preserved ejection fraction (H)  I50.32 furosemide (LASIX) 40 MG tablet      4. Benign essential hypertension  I10 lisinopril (ZESTRIL) 20 MG tablet      5. Benign prostatic hyperplasia, unspecified whether lower urinary tract symptoms present  N40.0 tamsulosin (FLOMAX) 0.4 MG capsule      6. Type 1 diabetes mellitus with other circulatory complication (H)  E10.59 FOOT EXAM     Miscellaneous DME Supply Order (Use only if a more specific DME order does not already exist)      7. Type 1 diabetes mellitus with stage 4 chronic kidney disease (H)  E10.22 Insulin Aspart, w/Niacinamide, (FIASP) 100 UNIT/ML SOLN    N18.4 FOOT EXAM     Miscellaneous DME Supply Order (Use only if a more specific DME order does not already exist)      8. Controlled type 1 diabetes mellitus with stage 4 chronic kidney disease (H)  E10.22 Albumin Random Urine Quantitative with Creat Ratio    N18.4 HEMOGLOBIN A1C     BASIC METABOLIC PANEL     Hemoglobin     Lipid Profile     CBC with Platelets     FOOT EXAM     Miscellaneous DME Supply Order (Use only if a more specific DME order does not already exist)     Albumin Random Urine Quantitative with Creat Ratio     HEMOGLOBIN A1C     BASIC METABOLIC PANEL     Lipid Profile     CBC with Platelets     CANCELED: Hemoglobin      9. Insulin pump in place  Z96.41       10. Moderate protein-calorie malnutrition  E44.0 Albumin     Prealbumin     Albumin     Prealbumin      11. Primary membranous nephropathy with nephrotic syndrome  N04.21 Albumin     Prealbumin     Albumin     Prealbumin      12. Screening for prostate cancer  Z12.5 PSA Screen GH     PSA Screen GH      13.  Amputation of right great toe  S98.111A FOOT EXAM     Miscellaneous DME Supply Order (Use only if a more specific DME order does not already exist)      14. H/O amputation of lesser toe, left  Z89.422         Assessment & Plan  Type 1 diabetes mellitus with stage 4 chronic kidney disease  Kidney function is well-managed with creatinine at 1.48 and UFR of 54. No significant heart failure or edema. Improved albumin levels indicate better fluid and protein balance. Previous pulmonary insult affecting renal function was treated with infusions, resulting in stabilization.  - Continue current management and monitoring of kidney function  - Schedule follow-up with nephrologist, Dr. Guzmán    Type 1 diabetes mellitus with other circulatory complication  Utilizes Dexcom CGM but lacks cloud connectivity since 2022. Reuses infusion sets due to supply issues, potentially impacting diabetes management. Reports good glycemic control with anticipated A1c around 7%.  - Reconnect Dexcom CGM to the cloud for enhanced monitoring  - Resolve supply issues with infusion sets and cartridges  - Consider changing supplier for diabetes supplies with Bindu's assistance    Diabetic peripheral neuropathy  Experiences decreased sensation in feet, affecting footwear fit. Notable muscle wasting in hands due to carpal tunnel syndrome and ulnar nerve issues. Surgical intervention has stabilized progression.  - Refer to [facility] for custom diabetic shoes and inserts  - Monitor for infection or worsening neuropathy    Benign essential hypertension  Blood pressure is well-controlled with current antihypertensive regimen.  - Continue current antihypertensive medications    General Health Maintenance  Has not received flu or COVID vaccines this season. Due for colonoscopy in 2027, following a 2022 procedure with a 5-year follow-up recommendation. Family history of prostate cancer necessitates PSA monitoring. RSV vaccine status undocumented; recommend  discussing with pharmacy or scheduling a nurse-only visit.  - Administer flu and COVID vaccines today  - Check PSA level during lab work  - Discuss RSV vaccine with pharmacy or schedule a nurse-only visit for administration    Signed, Tello Pro MD, FAAP, FACP  Internal Medicine & Pediatrics      Jenna Martinez is a 60 year old, presenting for the following:  Physical (Yearly physical and diabetes mellitus check. )    History of Present Illness  The patient, with a history of kidney and heart issues, diabetes, and protein malnutrition, presents for a routine check-up. He reports that his kidney and heart conditions have been stable, with recent blood work showing satisfactory results. He has been managing his diabetes with an insulin pump, but he has been having difficulty obtaining supplies for the pump, leading to reuse of infusion sets and tubing. The patient also reports that his blood sugar levels have been well-controlled, as monitored through the Dexcom fernando. He is expecting an A1c report around seven. The patient also mentions a weight gain over the winter, but his blood pressure remains well-controlled.          3/19/2025     2:54 PM   Additional Questions   Roomed by Nataly   Accompanied by self          History of Present Illness       Diabetes:   He presents for follow up of diabetes.   He is checking home blood glucose with a continuous glucose monitor.   He checks blood glucose before meals, after meals, before and after meals and at bedtime.  Blood glucose is sometimes over 200 and sometimes under 70. He is aware of hypoglycemia symptoms including shakiness, weakness, blurred vision and confusion.    He has no concerns regarding his diabetes at this time.  He is having numbness in feet, burning in feet and none of these symptoms. He is not experiencing numbness or burning in feet, excessive thirst, blurry vision, weight changes or redness, sores or blisters on feet.                     Advance  Care Planning  Patient does not have a Health Care Directive:       3/15/2025   General Health   How would you rate your overall physical health? (!) FAIR   Feel stress (tense, anxious, or unable to sleep) Only a little   (!) STRESS CONCERN      3/15/2025   Nutrition   Three or more servings of calcium each day? Yes   Diet: Low salt    Diabetic    Carbohydrate counting   How many servings of fruit and vegetables per day? (!) 0-1   How many sweetened beverages each day? 0-1       Multiple values from one day are sorted in reverse-chronological order         3/15/2025   Exercise   Days per week of moderate/strenous exercise 2 days   Average minutes spent exercising at this level 20 min   (!) EXERCISE CONCERN      3/15/2025   Social Factors   Frequency of gathering with friends or relatives Patient declined   Worry food won't last until get money to buy more Patient declined   Food not last or not have enough money for food? Patient declined   Do you have housing? (Housing is defined as stable permanent housing and does not include staying ouside in a car, in a tent, in an abandoned building, in an overnight shelter, or couch-surfing.) Patient declined   Are you worried about losing your housing? Patient declined   Lack of transportation? Patient declined   Unable to get utilities (heat,electricity)? Patient declined         3/19/2025   Fall Risk   Gait Speed Test (Document in seconds) 3.5   Gait Speed Test Interpretation Less than or equal to 5.00 seconds - PASS          3/15/2025   Dental   Dentist two times every year? (!) NO           Today's PHQ-2 Score:       3/19/2025     2:43 PM   PHQ-2 ( 1999 Pfizer)   Q1: Little interest or pleasure in doing things 0   Q2: Feeling down, depressed or hopeless 0   PHQ-2 Score 0    Q1: Little interest or pleasure in doing things Not at all   Q2: Feeling down, depressed or hopeless Not at all   PHQ-2 Score 0       Patient-reported           3/15/2025   Substance Use   Alcohol  "more than 3/day or more than 7/wk No   Do you use any other substances recreationally? No     Social History     Tobacco Use    Smoking status: Never     Passive exposure: Never    Smokeless tobacco: Never    Tobacco comments:     no ecig   Vaping Use    Vaping status: Never Used   Substance Use Topics    Alcohol use: Yes     Comment: very seldom    Drug use: Never           3/15/2025   STI Screening   New sexual partner(s) since last STI/HIV test? No   Last PSA:   Prostate Specific Antigen Screen   Date Value Ref Range Status   01/26/2024 2.37 0.00 - 3.50 ng/mL Final     ASCVD Risk   The ASCVD Risk score (Polo YOUSIF, et al., 2019) failed to calculate for the following reasons:    The valid total cholesterol range is 130 to 320 mg/dL           Reviewed and updated as needed this visit by Provider   Tobacco  Allergies  Meds  Problems  Med Hx  Surg Hx  Fam Hx                     Objective    Exam  /64 (BP Location: Right arm, Patient Position: Sitting, Cuff Size: Adult Large)   Pulse 67   Temp 97.6  F (36.4  C) (Tympanic)   Resp 16   Ht 1.943 m (6' 4.5\")   Wt 124 kg (273 lb 6.4 oz)   SpO2 98%   BMI 32.85 kg/m     Estimated body mass index is 32.85 kg/m  as calculated from the following:    Height as of this encounter: 1.943 m (6' 4.5\").    Weight as of this encounter: 124 kg (273 lb 6.4 oz).    Physical Exam  Physical Exam  MEASUREMENTS: Weight- 273.  CHEST: Lungs clear to auscultation bilaterally.  CARDIOVASCULAR: Heart regular rate and rhythm, no murmurs. No carotid bruits.  EXTREMITIES: Peripheral pulses intact. Mild redness in extremities, no signs of infection. Skin dry on extremities. Toenails thickened, consistent with diabetes.  MUSCULOSKELETAL: Significant muscle wasting in hands.  NEUROLOGICAL: Unable to distinguish touch on feet.      Foot Exam:  3/19/2025  Foot deformity?  YES missing right great, and left 5th  Current or previous foot ulcer?  YES prior  Current or previous " ulcerative callus?  YES prior  Partial or complete amputation of foot?  YES prior  Peripheral neuropathy with callus formation?  YES callouses bilat  Poor circulation?  YES absent DP      Sensation Testing done with monofilament   Right Foot: Sensation Absent at the following points , 1, 2, 3, 4, 5, 6, 7, 8, 9, and 10  Left Foot: Sensation Absent at the following points , 1, 2, 3, 4, 5, 6, 7, 8, 9, and 10         Risk Category: 2- Loss of protective sensation with weakness, deformity, pre-ulcer or callous but no ulceration          Signed Electronically by: Tello Pro MD

## 2025-03-19 NOTE — NURSING NOTE
"Chief Complaint   Patient presents with    Physical     Yearly physical and diabetes mellitus check.        Initial /64 (BP Location: Right arm, Patient Position: Sitting, Cuff Size: Adult Large)   Pulse 67   Temp 97.6  F (36.4  C) (Tympanic)   Resp 16   Ht 1.943 m (6' 4.5\")   Wt 124 kg (273 lb 6.4 oz)   SpO2 98%   BMI 32.85 kg/m   Estimated body mass index is 32.85 kg/m  as calculated from the following:    Height as of this encounter: 1.943 m (6' 4.5\").    Weight as of this encounter: 124 kg (273 lb 6.4 oz).  Medication Reconciliation: complete    Nataly Cadet LPN    Advance Care Directive reviewed    "

## 2025-03-19 NOTE — PATIENT INSTRUCTIONS
Patient Education   Preventive Care Advice   This is general advice given by our system to help you stay healthy. However, your care team may have specific advice just for you. Please talk to your care team about your preventive care needs.  Nutrition  Eat 5 or more servings of fruits and vegetables each day.  Try wheat bread, brown rice and whole grain pasta (instead of white bread, rice, and pasta).  Get enough calcium and vitamin D. Check the label on foods and aim for 100% of the RDA (recommended daily allowance).  Lifestyle  Exercise at least 150 minutes each week  (30 minutes a day, 5 days a week).  Do muscle strengthening activities 2 days a week. These help control your weight and prevent disease.  No smoking.  Wear sunscreen to prevent skin cancer.  Have a dental exam and cleaning every 6 months.  Yearly exams  See your health care team every year to talk about:  Any changes in your health.  Any medicines your care team has prescribed.  Preventive care, family planning, and ways to prevent chronic diseases.  Shots (vaccines)   HPV shots (up to age 26), if you've never had them before.  Hepatitis B shots (up to age 59), if you've never had them before.  COVID-19 shot: Get this shot when it's due.  Flu shot: Get a flu shot every year.  Tetanus shot: Get a tetanus shot every 10 years.  Pneumococcal, hepatitis A, and RSV shots: Ask your care team if you need these based on your risk.  Shingles shot (for age 50 and up)  General health tests  Diabetes screening:  Starting at age 35, Get screened for diabetes at least every 3 years.  If you are younger than age 35, ask your care team if you should be screened for diabetes.  Cholesterol test: At age 39, start having a cholesterol test every 5 years, or more often if advised.  Bone density scan (DEXA): At age 50, ask your care team if you should have this scan for osteoporosis (brittle bones).  Hepatitis C: Get tested at least once in your life.  STIs (sexually  transmitted infections)  Before age 24: Ask your care team if you should be screened for STIs.  After age 24: Get screened for STIs if you're at risk. You are at risk for STIs (including HIV) if:  You are sexually active with more than one person.  You don't use condoms every time.  You or a partner was diagnosed with a sexually transmitted infection.  If you are at risk for HIV, ask about PrEP medicine to prevent HIV.  Get tested for HIV at least once in your life, whether you are at risk for HIV or not.  Cancer screening tests  Cervical cancer screening: If you have a cervix, begin getting regular cervical cancer screening tests starting at age 21.  Breast cancer scan (mammogram): If you've ever had breasts, begin having regular mammograms starting at age 40. This is a scan to check for breast cancer.  Colon cancer screening: It is important to start screening for colon cancer at age 45.  Have a colonoscopy test every 10 years (or more often if you're at risk) Or, ask your provider about stool tests like a FIT test every year or Cologuard test every 3 years.  To learn more about your testing options, visit:   .  For help making a decision, visit:   https://bit.ly/dg76797.  Prostate cancer screening test: If you have a prostate, ask your care team if a prostate cancer screening test (PSA) at age 55 is right for you.  Lung cancer screening: If you are a current or former smoker ages 50 to 80, ask your care team if ongoing lung cancer screenings are right for you.  For informational purposes only. Not to replace the advice of your health care provider. Copyright   2023 [facility]. All rights reserved. Clinically reviewed by the [facility]. QWiPS [id number] - REV 01/24.  Preventing Falls: Care Instructions  Injuries and health problems such as trouble walking or poor eyesight can increase your risk of falling. So can some medicines. But there are things you can do to help  "prevent falls. You can exercise to get stronger. You can also arrange your home to make it safer.    Talk to your doctor about the medicines you take. Ask if any of them increase the risk of falls and whether they can be changed or stopped.   Try to exercise regularly. It can help improve your strength and balance. This can help lower your risk of falling.         Practice fall safety and prevention.   Wear low-heeled shoes that fit well and give your feet good support. Talk to your doctor if you have foot problems that make this hard.  Carry a cellphone or wear a medical alert device that you can use to call for help.  Use stepladders instead of chairs to reach high objects. Don't climb if you're at risk for falls. Ask for help, if needed.  Wear the correct eyeglasses, if you need them.        Make your home safer.   Remove rugs, cords, clutter, and furniture from walkways.  Keep your house well lit. Use night-lights in hallways and bathrooms.  Install and use sturdy handrails on stairways.  Wear nonskid footwear, even inside. Don't walk barefoot or in socks without shoes.        Be safe outside.   Use handrails, curb cuts, and ramps whenever possible.  Keep your hands free by using a shoulder bag or backpack.  Try to walk in well-lit areas. Watch out for uneven ground, changes in pavement, and debris.  Be careful in the winter. Walk on the grass or gravel when sidewalks are slippery. Use de-icer on steps and walkways. Add non-slip devices to shoes.    Put grab bars and nonskid mats in your shower or tub and near the toilet. Try to use a shower chair or bath bench when bathing.   Get into a tub or shower by putting in your weaker leg first. Get out with your strong side first. Have a phone or medical alert device in the bathroom with you.   Where can you learn more?  Go to https://www.[employer].net/patiented  Enter G117 in the search box to learn more about \"Preventing Falls: Care Instructions.\"  Current as of: " July 31, 2024  Content Version: 14.4    9521-8752 [employer].   Care instructions adapted under license by your healthcare professional. If you have questions about a medical condition or this instruction, always ask your healthcare professional. [employer] disclaims any warranty or liability for your use of this information.

## 2025-04-01 DIAGNOSIS — E10.65 UNCONTROLLED TYPE 1 DIABETES MELLITUS WITH HYPERGLYCEMIA (H): ICD-10-CM

## 2025-04-01 NOTE — TELEPHONE ENCOUNTER
[facility] sent Rx request for the following:      Requested Prescriptions   Pending Prescriptions Disp Refills    blood glucose (NO BRAND SPECIFIED) test strip 100 strip 11     Sig: Dispense item covered by insurance. Test blood sugar 6 times daily.   Last Prescription Date:   1/26/24  Last Fill Qty/Refills:         100, R-11    Last Office Visit:              3/19/25 (Physical)   Future Office visit:           None    Per LOV note:    Return in about 53 weeks (around 3/25/2026) for Annual Wellness Visit.    Prescription refilled per RN Medication Refill Policy.................... Liana Gomez RN ....................  4/1/2025   3:01 PM

## 2025-06-28 ENCOUNTER — HEALTH MAINTENANCE LETTER (OUTPATIENT)
Age: 60
End: 2025-06-28

## (undated) RX ORDER — AMINOPHYLLINE 25 MG/ML
INJECTION, SOLUTION INTRAVENOUS
Status: DISPENSED
Start: 2024-09-06

## (undated) RX ORDER — CEFTRIAXONE 2 G/1
INJECTION, POWDER, FOR SOLUTION INTRAMUSCULAR; INTRAVENOUS
Status: DISPENSED
Start: 2024-03-03

## (undated) RX ORDER — REGADENOSON 0.08 MG/ML
INJECTION, SOLUTION INTRAVENOUS
Status: DISPENSED
Start: 2024-09-06

## (undated) RX ORDER — REGADENOSON 0.08 MG/ML
INJECTION, SOLUTION INTRAVENOUS
Status: DISPENSED
Start: 2023-11-16

## (undated) RX ORDER — SILVER SULFADIAZINE 10 MG/G
CREAM TOPICAL
Status: DISPENSED
Start: 2020-12-14